# Patient Record
Sex: MALE | Race: WHITE | NOT HISPANIC OR LATINO
[De-identification: names, ages, dates, MRNs, and addresses within clinical notes are randomized per-mention and may not be internally consistent; named-entity substitution may affect disease eponyms.]

---

## 2018-11-07 PROBLEM — Z00.00 ENCOUNTER FOR PREVENTIVE HEALTH EXAMINATION: Status: ACTIVE | Noted: 2018-11-07

## 2018-12-07 ENCOUNTER — APPOINTMENT (OUTPATIENT)
Dept: OTOLARYNGOLOGY | Facility: CLINIC | Age: 58
End: 2018-12-07

## 2019-03-28 ENCOUNTER — APPOINTMENT (OUTPATIENT)
Dept: UROLOGY | Facility: CLINIC | Age: 59
End: 2019-03-28

## 2019-04-15 ENCOUNTER — APPOINTMENT (OUTPATIENT)
Dept: ORTHOPEDIC SURGERY | Facility: CLINIC | Age: 59
End: 2019-04-15

## 2019-05-17 ENCOUNTER — APPOINTMENT (OUTPATIENT)
Dept: NEUROLOGY | Facility: CLINIC | Age: 59
End: 2019-05-17
Payer: MEDICAID

## 2019-05-17 VITALS
HEIGHT: 69.69 IN | TEMPERATURE: 97.9 F | WEIGHT: 191 LBS | SYSTOLIC BLOOD PRESSURE: 114 MMHG | DIASTOLIC BLOOD PRESSURE: 72 MMHG | BODY MASS INDEX: 27.65 KG/M2 | OXYGEN SATURATION: 99 % | HEART RATE: 68 BPM

## 2019-05-17 DIAGNOSIS — Z78.9 OTHER SPECIFIED HEALTH STATUS: ICD-10-CM

## 2019-05-17 DIAGNOSIS — Z82.49 FAMILY HISTORY OF ISCHEMIC HEART DISEASE AND OTHER DISEASES OF THE CIRCULATORY SYSTEM: ICD-10-CM

## 2019-05-17 PROCEDURE — 99203 OFFICE O/P NEW LOW 30 MIN: CPT

## 2019-05-17 NOTE — PHYSICAL EXAM
[FreeTextEntry1] : General:\par Constitutional:  Sitting comfortably in NAD.\par Psychiatric: well-groomed, appropriate affect, insight/judgment intact\par Ears, Nose, Throat: no abnormalities, mucus membranes moist\par Mallampati: 1/4\par Neck: supple, no lymphadenopathy or nodules palpable\par Neck Circumference:\par Cardiovascular: regular rate and rhythm, normal S1/S2, no murmurs \par Chest: Clear to bases. 	Abdomen: soft, non-tender\par Extremities: no edema, clubbing or cyanosis\par Skin:  no rash or neurocutaneous signs \par \par Cognitive:\par Orientation, language, memory and knowledge screens intact.\par Cranial Nerves:\par II: Right eye right lateral field cut, mostly near to him; disc margins sharp. III/IV/VI: Right eye miosis/anisocoria with right eye smaller EOMF No nystagmus\par V1V2V3: Symmetric, VII: Face appears symmetric VIII: Normal to screening, IX/X: Palate Elevates Symmetrical  XI: Trapezius Symmetric  XII: Tongue midline\par Motor:\par Power: 5/5 throughout, tone: normal x 4 limbs\par Mild fine tremor in both hands.\par \par Sensation:\par Intact to light touch. \par \par Coordination/Gait:\par Finger-nose-finger intact, normal rapid-alternating movements.\par Fine motor normal with normal rapid finger taps and heel-toe tapping \par Narrow based gait, tandem forward ok\par Hops well on both feet, heel and toe walking normal \par \par Reflexes:\par DTR: 1-2+ symmetric x 4 limbs, though reduced at ankles\par \par

## 2019-05-17 NOTE — HISTORY OF PRESENT ILLNESS
[FreeTextEntry1] : Braxton is a 57 yo RH-man\par \par He notes feels a vibration in his arms, ever since grade 1.  It worsened through adolescence, and has stayed the same severity.\par Sometimes it is worse, brought out by anxiety.  Public speaking was a problem, but better now.\par He has noticed that alcohol can improve the symptoms.  He used to be , but no longer has alcohol.\par Feels that his arms are hollow.\par Handwriting can be tricky at times. fluctuates.  But fluctuations are no worse than 10 years ago, for instance.  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  Father was 'an alcoholic'.  His brother is autistic and not in touch.\par \par No meds now.\par Allergies: NKDA, but mild hayfever/pollen problems.\par \par Social: teacher now,

## 2019-05-17 NOTE — DISCUSSION/SUMMARY
[FreeTextEntry1] : Impression:\par 1) probable essential tremor, fits all of the characteristics, and no warning signs of degenerative extrapyramidal signs.  Treatment options include propranolol and primidone.  He is interested more in a longer-term treatment, and would prefer to avoid beta-blocker-type side-effects due to his active lifestyle, so we will try primidone first.\par \par Plan:\par 1) primidone start at 50mg/d and increase to 100mg as needed.

## 2019-05-17 NOTE — REVIEW OF SYSTEMS
[FreeTextEntry1] : ROS intake form reviewed with patient, see attachment; all negative aside from HPI

## 2019-09-05 ENCOUNTER — APPOINTMENT (OUTPATIENT)
Dept: PHYSICAL MEDICINE AND REHAB | Facility: CLINIC | Age: 59
End: 2019-09-05
Payer: MEDICAID

## 2019-09-05 VITALS — HEIGHT: 69 IN | BODY MASS INDEX: 28.29 KG/M2 | WEIGHT: 191 LBS | RESPIRATION RATE: 16 BRPM

## 2019-09-05 DIAGNOSIS — M25.562 PAIN IN LEFT KNEE: ICD-10-CM

## 2019-09-05 PROCEDURE — 99203 OFFICE O/P NEW LOW 30 MIN: CPT

## 2019-09-16 ENCOUNTER — APPOINTMENT (OUTPATIENT)
Dept: ORTHOPEDIC SURGERY | Facility: CLINIC | Age: 59
End: 2019-09-16

## 2019-11-11 ENCOUNTER — APPOINTMENT (OUTPATIENT)
Dept: NEUROLOGY | Facility: CLINIC | Age: 59
End: 2019-11-11
Payer: MEDICAID

## 2019-11-11 VITALS
WEIGHT: 187 LBS | OXYGEN SATURATION: 96 % | HEART RATE: 66 BPM | DIASTOLIC BLOOD PRESSURE: 87 MMHG | SYSTOLIC BLOOD PRESSURE: 137 MMHG | BODY MASS INDEX: 27.7 KG/M2 | HEIGHT: 69 IN

## 2019-11-11 PROCEDURE — 99213 OFFICE O/P EST LOW 20 MIN: CPT

## 2019-11-13 NOTE — HISTORY OF PRESENT ILLNESS
[FreeTextEntry1] : Braxton is a 60 yo man with a history of tremor.\par \par It appeared to be an essential tremor, and he has found a significant improvement, first on the 50mg, then about 80% improvement at 50mg bid.  Handwriting can still be challenging.\par \par Also taking effexor in the AM.\par Primidone has been partly calming as well.  He sleeps through the night.  He wakes up feeling great, and the morning dose does not make him too sleepy.\par \par \par Background:\par He notes feels a vibration in his arms, ever since grade 1.  It worsened through adolescence, and has stayed the same severity.\par Sometimes it is worse, brought out by anxiety.  Public speaking was a problem, but better now.\par He has noticed that alcohol can improve the symptoms.  He used to be , but no longer has alcohol.\par Feels that his arms are hollow.\par Handwriting can be tricky at times. fluctuates.  But fluctuations are no worse than 10 years ago, for instance.\par \par He does not recall seeing it in his family, perhaps with his mother, but not sure.  Father was 'an alcoholic'.  His brother is autistic and not in touch.\par \par No meds now.\par Allergies: NKDA, but mild hayfever/pollen problems.\par \par Social: teacher now,

## 2019-11-13 NOTE — DISCUSSION/SUMMARY
[FreeTextEntry1] : Impression:\par 1) essential tremor, fits all of the characteristics with good response to primidone (about 80% resolved) and no warning signs of degenerative extrapyramidal signs.  However, still some difficulties with handwriting and tremor still shows up from time to time, so room for improvement.\par \par Plan:\par 1) primidone, continue to attempt to increase dose as needed, to response, to 150mg/d.  Can likely go higher as long as no adverse effects,\par 2) alternative includes low dose propranolol PRN.

## 2019-11-13 NOTE — PHYSICAL EXAM
[FreeTextEntry1] : General:\par Constitutional:  Sitting comfortably in NAD.\par Psychiatric: well-groomed, appropriate affect\par Ears, Nose, Throat: no abnormalities, mucus membranes moist\par Neck: supple\par Extremities: no edema, clubbing or cyanosis\par Skin: no rash or neuro-cutaneous signs \par \par Cognitive:\par Orientation, language, memory and knowledge screens intact.\par \par Cranial Nerves:\par II: MARIAMA. III/IV/VI: EOM Full.  VII: Face appears symmetric VIII: Normal to screening\par IX/X: normal phonation  XI: Trapezius Symmetric  XII: Tongue midline\par Motor:\par Power: no pronator drift\par \par Tremor:  mild but visible fine tremor with hands outstretched and under the nose.\par  \par Narrow based gait\par \par \par \par

## 2020-01-03 ENCOUNTER — APPOINTMENT (OUTPATIENT)
Dept: UROLOGY | Facility: CLINIC | Age: 60
End: 2020-01-03

## 2020-01-17 ENCOUNTER — APPOINTMENT (OUTPATIENT)
Dept: PHYSICAL MEDICINE AND REHAB | Facility: CLINIC | Age: 60
End: 2020-01-17

## 2020-01-27 ENCOUNTER — APPOINTMENT (OUTPATIENT)
Dept: PHYSICAL MEDICINE AND REHAB | Facility: CLINIC | Age: 60
End: 2020-01-27
Payer: MEDICAID

## 2020-01-27 VITALS
HEIGHT: 69 IN | WEIGHT: 190 LBS | BODY MASS INDEX: 28.14 KG/M2 | HEART RATE: 78 BPM | RESPIRATION RATE: 16 BRPM | OXYGEN SATURATION: 98 %

## 2020-01-27 DIAGNOSIS — M51.27 OTHER INTERVERTEBRAL DISC DISPLACEMENT, LUMBOSACRAL REGION: ICD-10-CM

## 2020-01-27 PROCEDURE — 99214 OFFICE O/P EST MOD 30 MIN: CPT

## 2020-02-06 ENCOUNTER — FORM ENCOUNTER (OUTPATIENT)
Age: 60
End: 2020-02-06

## 2020-02-07 ENCOUNTER — OUTPATIENT (OUTPATIENT)
Dept: OUTPATIENT SERVICES | Facility: HOSPITAL | Age: 60
LOS: 1 days | End: 2020-02-07
Payer: MEDICAID

## 2020-02-07 ENCOUNTER — APPOINTMENT (OUTPATIENT)
Dept: RADIOLOGY | Facility: CLINIC | Age: 60
End: 2020-02-07

## 2020-02-07 PROCEDURE — 73502 X-RAY EXAM HIP UNI 2-3 VIEWS: CPT | Mod: 26,RT

## 2020-02-14 ENCOUNTER — APPOINTMENT (OUTPATIENT)
Dept: PHYSICAL MEDICINE AND REHAB | Facility: CLINIC | Age: 60
End: 2020-02-14
Payer: MEDICAID

## 2020-02-14 VITALS
BODY MASS INDEX: 28.14 KG/M2 | RESPIRATION RATE: 16 BRPM | OXYGEN SATURATION: 98 % | HEART RATE: 78 BPM | HEIGHT: 69 IN | WEIGHT: 190 LBS

## 2020-02-14 PROCEDURE — 99214 OFFICE O/P EST MOD 30 MIN: CPT | Mod: 25

## 2020-02-14 PROCEDURE — 20611 DRAIN/INJ JOINT/BURSA W/US: CPT | Mod: RT

## 2020-02-18 ENCOUNTER — FORM ENCOUNTER (OUTPATIENT)
Age: 60
End: 2020-02-18

## 2020-02-19 ENCOUNTER — APPOINTMENT (OUTPATIENT)
Dept: MRI IMAGING | Facility: CLINIC | Age: 60
End: 2020-02-19
Payer: MEDICAID

## 2020-02-19 ENCOUNTER — OUTPATIENT (OUTPATIENT)
Dept: OUTPATIENT SERVICES | Facility: HOSPITAL | Age: 60
LOS: 1 days | End: 2020-02-19

## 2020-02-19 PROCEDURE — 72148 MRI LUMBAR SPINE W/O DYE: CPT | Mod: 26

## 2020-02-24 ENCOUNTER — APPOINTMENT (OUTPATIENT)
Dept: PHYSICAL MEDICINE AND REHAB | Facility: CLINIC | Age: 60
End: 2020-02-24
Payer: MEDICAID

## 2020-02-24 VITALS
OXYGEN SATURATION: 97 % | HEART RATE: 70 BPM | WEIGHT: 190 LBS | HEIGHT: 69 IN | RESPIRATION RATE: 16 BRPM | BODY MASS INDEX: 28.14 KG/M2

## 2020-02-24 DIAGNOSIS — M76.11 PSOAS TENDINITIS, RIGHT HIP: ICD-10-CM

## 2020-02-24 PROCEDURE — 20611 DRAIN/INJ JOINT/BURSA W/US: CPT | Mod: RT

## 2020-02-24 PROCEDURE — 99214 OFFICE O/P EST MOD 30 MIN: CPT | Mod: 25

## 2020-02-24 NOTE — ASSESSMENT
[FreeTextEntry1] : Impression:\par 1. Left Meniscus Injury\par 2. Right Hip Labral Tear\par 3. Right Psoas Tendinopathy\par 3. Bilateral S1 Radiculopathy\par \par Plan: Review of the history, physical examination, and imaging, the patient's symptoms are consistent with Right Hip labral tear and psoas tendinopathy with underlying and discogenic LBP and bilateral S1 radiculopathy. The Left Meniscus Injury has improved with PT. The imaging results and diagnosis were discussed in detail with the patient. We discussed all the potential treatment options including physical therapy, oral medication, ultrasound guided injections, and surgery; as well as alternative therapeutics such as acupuncture and massage. We also discussed the importance of weight loss, ergonomics, and posture in the long term management of the condition. At this time the patient is interested in interventional options for symptom reduction; I offered USG Psoas Bursa CSI in the office today, please see procedure note for full detail. We will also plan for Bilateral S1 TFESI. We discussed all the risks, benefits, alternative treatments, and prognosis. The patient expressed understanding and would like to move forward. We will schedule for the injection as well as follow up post-procedure. The patient expressed verbal understanding and is in agreement with the plan of care. All of the patient's questions and concerns were addressed during today's visit.

## 2020-02-24 NOTE — HISTORY OF PRESENT ILLNESS
[FreeTextEntry1] : Mr. ORAL ESPINOZA is a very pleasant 59 year male who comes in for MRI review and follow up evaluation of left knee, hip, and low back. At his previous visit we performed intra-articular CSI to his hip which provided 50% improvement in his hip symptoms, however he continues to have focal sharp groin pain, especially with flexing his hip. The knee pain has improved after participating in physical therapy. The back pain radiates into his right>>left legs, through his buttock/hamstring/calf, intermittent in nature and described as sharp and aching. The pain is rated as 3/10 during today's visit, and ranges from 3-7/10. The patient's symptoms are aggravated by standing extended periods of time  and alleviated by rest and stretching . The patient denies any night pain, numbness/tingling, weakness, or bowel/bladder dysfunction. The patient has no other complaints at this time.

## 2020-02-24 NOTE — PROCEDURE
[de-identified] : Procedure: RIGHT Iliopsoas Bursa Injection with Ultrasound Guidance\par \par Findings: The RIGHT anterior hip was examined under ultrasound using a 10mHz linear array transducer. There was evidence of a mild bursal effusion in the iliopsoas bursa.\par \par Injectate: 1 mL Kenalog (40mg/mL) with 1ml 0.25% Bupivicaine and 1mL 1% Lidocaine\par \par After risks, benefits and alternatives were discussed and the patient expressed understanding, informed consent was obtained. Risks include but are not limited to bleeding, infection, worsening pain, nerve damage, scar formation. \par \par Ultrasound was indicated for needle guidance, to ensure needle placement, and to assess for any effusions or vasculature in the path of the needle.\par \par The RIGHT iliopsoas tendon and bursa was identified by ultrasound, and probe location was marked on the skin. Ultrasound findings are noted above. The overlying area was prepped and draped in a sterile fashion with Chloraprep. After skin preparation, a 25 gauge needle was used to anesthetize the skin with 3 mL of 1% Lidocaine. Then a 3.5" 22 gauge spinal needle was then introduced and advanced under ultrasound guidance, needle tip position was confirmed, aspiration for blood prior to injection was negative. The injectate solution mentioned above was administered into the iliopsoas bursa under direct ultrasound visualization without resistance. Fluid distention was appreciated on ultrasound confirming placement. Ultrasound images were permanently stored as part of the patient’s record. \par \par At the conclusion of the procedure the needle was removed, the area was cleaned with alcohol and a band-aid was placed over the injection site. The patient tolerated the procedure well. There were no complications during or after the procedure. The patient reported improvement in symptoms following the procedure. Post-procedure care instructions and follow up appointment were given. If there are any complications, the patient was instructed to call the office. \par \par \par

## 2020-02-24 NOTE — DATA REVIEWED
[FreeTextEntry1] : XR L Knee 3/2019: Unremarkable\par \par MR LS 2/2020: L4/5 posterior annular fissure, facet hypertrophy, bilateral neural foraminal narrowing. L5/S1 diffuse disc bulge, facet hypertrophy, bilateral neural foraminal narrowing with compression of the L5 nerve roots bilaterally.\par

## 2020-02-24 NOTE — PHYSICAL EXAM
[FreeTextEntry1] : GEN:  NAD, AAOx3.\par HEENT:  NCAT. EOMI. Anicteric sclerae, no discharge.\par PSYCH:  Normal mood and affect. Responds appropriately to commands.\par CV:  DP and PT pulses 2+ bilaterally, no edema.\par INSPECTION:  No effusion, discoloration. Normal patellar tracking. \par GAIT: (+) antalgic.\par Lumbosacral AROM: within normal limits.\par Hip ROM: Decreased Flex/IR Right w/ pain end-range. \par Knee AROM: Full and pain free.\par PALPATION:  No effusion, warmth or crepitus. No tenderness noted at patellar facets, medial or lateral joint line, popliteal fossa, patellar tendon, quad tendon, hamstrings, ITB.  \par LYMPH:  No popliteal LAD or lymphedema.\par REFLEXES:  2+ symmetric bilateral knee and ankles.\par SENSATION:  Normal to light touch in the bilateral lower extremities.\par MOTOR:  5/5 in all key muscle groups of the bilateral lower limbs. No spasticity, tremor, atrophy or contractures noted.\par SPECIAL:  Apley Grind negative bilaterally, Barry negative bilaterally, Medial/Lateral compression negative bilaterally, Varus/Valgus stress negative bilaterally, Single Leg Squat (+) Left, Anterior/Posterior drawer negative bilaterally, Lachman negative bilaterally. Hyperflexion (+) Left. \par \par LUMBAR\par STRENGTH: 5/5 bilateral hip flexors, knee extensors, knee flexors, ankle dorsiflexors, long toe extensors, ankle plantar flexors, hip extensors, hip abductors. (+) Pain Resisted Hip Flexion.\par TONE: Normal, No clonus.\par REFLEXES: 2+ symmetric patella, medial hamstring, achilles. Plantars downgoing bilaterally.\par SENS: Grossly intact to light touch bilateral lower extremities.\par INSP: Spine alignment is midline, with no evidence of scoliosis.\par STANCE: No Trendelenburg with single leg stance.\par GAIT: Non antalgic, normal reciprocating heel to toe\par LUMBAR ROM: Flexion, extension, side-bending, rotation, oblique extension all full and pain free.  \par HIP ROM: Full w/ pain Flexion Right. \par PALP: There is no tenderness over the midline spinous processes, paravertebral muscles, SIJ, or greater trochanters bilaterally. (+) TTP R-Psoas Bursa. \par SPECIAL: SLR and Slump (+) bilaterally. ANAM RAMIRES negative bilaterally.

## 2020-03-10 ENCOUNTER — APPOINTMENT (OUTPATIENT)
Dept: UROLOGY | Facility: CLINIC | Age: 60
End: 2020-03-10
Payer: MEDICAID

## 2020-03-10 ENCOUNTER — APPOINTMENT (OUTPATIENT)
Dept: PHYSICAL MEDICINE AND REHAB | Facility: CLINIC | Age: 60
End: 2020-03-10
Payer: MEDICAID

## 2020-03-10 VITALS — HEART RATE: 73 BPM | TEMPERATURE: 98.5 F | DIASTOLIC BLOOD PRESSURE: 86 MMHG | SYSTOLIC BLOOD PRESSURE: 141 MMHG

## 2020-03-10 PROCEDURE — 64483 NJX AA&/STRD TFRM EPI L/S 1: CPT | Mod: 50

## 2020-03-10 PROCEDURE — 51798 US URINE CAPACITY MEASURE: CPT

## 2020-03-10 PROCEDURE — 99204 OFFICE O/P NEW MOD 45 MIN: CPT | Mod: 25

## 2020-03-10 NOTE — HISTORY OF PRESENT ILLNESS
[FreeTextEntry1] : 59M PSA 0.4 3/2019 referred with voiding complaints. IPSS 21. nocturia x 3-4. no urgneyc. no hematuria. no dysuria. decreased FOS. multiple small volume voids. +straining. +hesitancy. +intermittency. no fevers chills. no family hsitory prostate kdieny baldder cancer.

## 2020-03-10 NOTE — PHYSICAL EXAM
[General Appearance - Well Developed] : well developed [General Appearance - Well Nourished] : well nourished [Normal Appearance] : normal appearance [Well Groomed] : well groomed [Heart Rate And Rhythm] : Heart rate and rhythm were normal [] : no respiratory distress [Abdomen Soft] : soft [Abdomen Tenderness] : non-tender [Abdomen Hernia] : no hernia was discovered [Costovertebral Angle Tenderness] : no ~M costovertebral angle tenderness [Urethral Meatus] : meatus normal [Penis Abnormality] : normal circumcised penis [Urinary Bladder Findings] : the bladder was normal on palpation [Scrotum] : the scrotum was normal [Epididymis] : the epididymides were normal [Testes Tenderness] : no tenderness of the testes [Testes Mass (___cm)] : there were no testicular masses [No Prostate Nodules] : no prostate nodules [Prostate Size ___ gm] : prostate size [unfilled] gm [Normal Station and Gait] : the gait and station were normal for the patient's age [Skin Color & Pigmentation] : normal skin color and pigmentation [No Focal Deficits] : no focal deficits [Oriented To Time, Place, And Person] : oriented to person, place, and time [No Palpable Adenopathy] : no palpable adenopathy

## 2020-04-01 ENCOUNTER — APPOINTMENT (OUTPATIENT)
Dept: PHYSICAL MEDICINE AND REHAB | Facility: CLINIC | Age: 60
End: 2020-04-01
Payer: MEDICAID

## 2020-04-01 VITALS — HEIGHT: 69 IN | BODY MASS INDEX: 27.4 KG/M2 | WEIGHT: 185 LBS

## 2020-04-01 PROCEDURE — 99214 OFFICE O/P EST MOD 30 MIN: CPT | Mod: 95

## 2020-04-01 NOTE — ASSESSMENT
[FreeTextEntry1] : Impression:\par 1. Left Meniscus Injury\par 2. Right Hip Labral Tear\par 3. Right Psoas Tendinopathy\par 3. Bilateral S1 Radiculopathy\par \par Plan: The patient has shown a positive response to the epidural steroid injection, reporting approximately 40-50% improvement in symptoms. Further review of the history and imaging, the patient's residual symptoms remain  consistent with Right Hip labral tear and psoas tendinopathy with underlying and discogenic LBP and bilateral S1 radiculopathy. The Left Meniscus Injury has improved with PT. The imaging results and diagnosis were reviewed with the patient. We discussed available treatment options given the current circumstances which include oral medication, virtual physical therapy/home exercises, and rest/heat/ice. We also reviewed the importance of weight loss, biomechanics and posture in the management of the condition. At this time the patient is interested in doing HEP and waiting for repeat interventional options when they become available. The patient expressed verbal understanding and is in agreement with the plan of care. All of the patient's questions and concerns were addressed during today's visit.\par \par At total of 25 minutes of face-to-face time was spent with the patient during the virtual encounter today.

## 2020-04-01 NOTE — HISTORY OF PRESENT ILLNESS
[FreeTextEntry1] : Mr. ORAL ESPINOZA is a 59 year male who is being evaluated today via TeleHealth due to the ongoing COVID-19 pandemic. The encounter was provided using [employer] Room with Real-time 2-way audio/visual communication. The patient verbally consented to receiving care via TeleHealth services in accordance with the details outlined in the "Informed Consent for TeleHealth Services during a Public Health Emergency" form. \par \par This is a follow up encounter after undergoing an GABBY on 03/10/2020. The patient reports approximately 40% improvement following the procedure at this time. There is still some residual discomfort, which remains in the back and radiates into his right>>left legs, through his buttock/hamstring/calf, intermittent in nature and described as sharp and aching. The pain is rated as 3/10 during today's visit, and ranges from 3-7/10. The patient's symptoms are aggravated by standing extended periods of time  and alleviated by rest and stretching . The patient denies any night pain, numbness/tingling, weakness, or bowel/bladder dysfunction. His right groin/hip pain has improved since the USG Psoas CSI at his last visit. The patient has no other complaints at this time.\par

## 2020-04-01 NOTE — PHYSICAL EXAM
[FreeTextEntry1] : GEN:  NAD, AAOx3.\par PSYCH:  Normal mood and affect. Responds appropriately to commands.\par INSP: Spine alignment is midline, with no evidence of scoliosis.\par STANCE: No Trendelenburg with single leg stance.\par GAIT: Non antalgic, normal reciprocating heel to toe\par LUMBAR AROM: Flexion, extension, side-bending, rotation full and pain free.  \par HIP AROM: Full and pain free \par

## 2020-04-01 NOTE — REVIEW OF SYSTEMS
[Patient Intake Form Reviewed] : Patient intake form was reviewed [FreeTextEntry1] : CON: Denies fevers/chills, night pain/weight loss.\par CVS: Denies chest pain/SOB/cough/palpitations. \par MSK: as per HPI, otherwise denies additional joint pain, swelling, stiffness.

## 2020-04-03 ENCOUNTER — APPOINTMENT (OUTPATIENT)
Dept: UROLOGY | Facility: CLINIC | Age: 60
End: 2020-04-03

## 2020-04-06 ENCOUNTER — APPOINTMENT (OUTPATIENT)
Dept: UROLOGY | Facility: CLINIC | Age: 60
End: 2020-04-06
Payer: MEDICAID

## 2020-04-06 PROCEDURE — 99213 OFFICE O/P EST LOW 20 MIN: CPT | Mod: 95

## 2020-04-06 NOTE — ASSESSMENT
[FreeTextEntry1] : BPH\par persistent bother but good improvement in symptoms\par reviewed risks side effects with increased flomax dosing\par would like to increase to flomax 0.8\par will follow up in 1 month to review

## 2020-04-06 NOTE — HISTORY OF PRESENT ILLNESS
[Home] : at home, [unfilled] , at the time of the visit. [Medical Office: ([facility])___] : at ~his/her~ medical office located in [place] [Patient] : the patient [FreeTextEntry1] : The patient-doctor relationship has been established in a face to face fashion via real time video/audio HIPAA compliant communication using telemedicine software.  The patient's identity has been confirmed.  The patient was previously emailed a copy of the telemedicine consent.  They have had a chance to review and has now given verbal consent and has requested care to be assessed and treated through telemedicine and understands there maybe limitations in this process and they may need further follow up care in the office and or hospital settings.   \par  \par Returns for follow up\par now on tamsulosin 0.4 x 1 month\par symptoms have improved 80%\par stranguria and nighttime symptoms remain and are mildly bothersome\par no gross hematuria\par feels well overall\par no additional complaints

## 2020-04-20 ENCOUNTER — TRANSCRIPTION ENCOUNTER (OUTPATIENT)
Age: 60
End: 2020-04-20

## 2020-05-06 ENCOUNTER — APPOINTMENT (OUTPATIENT)
Dept: NEUROLOGY | Facility: CLINIC | Age: 60
End: 2020-05-06

## 2020-05-11 ENCOUNTER — APPOINTMENT (OUTPATIENT)
Dept: NEUROLOGY | Facility: CLINIC | Age: 60
End: 2020-05-11

## 2020-05-12 ENCOUNTER — APPOINTMENT (OUTPATIENT)
Dept: UROLOGY | Facility: CLINIC | Age: 60
End: 2020-05-12
Payer: MEDICAID

## 2020-05-12 PROCEDURE — 99441: CPT

## 2020-05-12 NOTE — ASSESSMENT
[FreeTextEntry1] : BPH on flomax 0.8\par happy with improvement\par no interest in PUL at this time\par f/u 6 months

## 2020-05-12 NOTE — HISTORY OF PRESENT ILLNESS
[Home] : at home, [unfilled] , at the time of the visit. [Medical Office: ([facility])___] : at the medical office located in  [Patient] : the patient [FreeTextEntry1] : doing great on flomax 0.8\par feels well \par no side effects

## 2020-05-18 ENCOUNTER — APPOINTMENT (OUTPATIENT)
Dept: PHYSICAL MEDICINE AND REHAB | Facility: CLINIC | Age: 60
End: 2020-05-18
Payer: MEDICAID

## 2020-05-18 PROCEDURE — 99214 OFFICE O/P EST MOD 30 MIN: CPT | Mod: 95

## 2020-05-18 NOTE — HISTORY OF PRESENT ILLNESS
[FreeTextEntry1] : Mr. ORAL ESPINOZA is a 59 year male who is being evaluated today via TeleHealth due to the ongoing COVID-19 pandemic. The encounter was provided using [employer] Room with Real-time 2-way audio/visual communication. The patient verbally consented to receiving care via TeleHealth services in accordance with the details outlined in the "Informed Consent for TeleHealth Services during a Public Health Emergency" form. \par \par This is a follow up evaluation of back pain radiating into his right>>left legs, through his buttock, hamstring, calf, intermittent in nature and described as sharp and aching. There is also persistent pain in his right hip. The pain is rated as 3/10 during today's visit, and ranges from 3-7/10. The patient's symptoms are aggravated by standing extended periods of time  and alleviated by rest and stretching . The patient denies any night pain, numbness/tingling, weakness, or bowel/bladder dysfunction. The patient has no other complaints at this time.\par

## 2020-05-18 NOTE — ASSESSMENT
[FreeTextEntry1] : Impression:\par 1. Left Meniscus Injury\par 2. Right Hip Labral Tear\par 3. Right Psoas Tendinopathy\par 3. Bilateral S1 Radiculopathy\par \par Plan: The patient has shown a positive response to the epidural steroid injection, reporting approximately 40-50% improvement in symptoms. Further review of the history and imaging, the patient's residual symptoms remain  consistent with Right Hip labral tear and psoas tendinopathy with underlying and discogenic LBP and bilateral S1 radiculopathy. The Left Meniscus Injury has improved with PT. The imaging results and diagnosis were reviewed with the patient. We discussed available treatment options given the current circumstances which include oral medication, virtual physical therapy/home exercises, and rest/heat/ice. We also reviewed the importance of weight loss, biomechanics and posture in the management of the condition. At this time the patient is interested in doing HEP and waiting for repeat interventional options when they become available. We will also send him for MRI Right Hip to further evaluate for labral pathology as his hip pain has not responded significantly to conservative management. The patient expressed verbal understanding and is in agreement with the plan of care. All of the patient's questions and concerns were addressed during today's visit.\par \par At total of 25 minutes of face-to-face time was spent with the patient during the virtual encounter today.

## 2020-06-04 ENCOUNTER — APPOINTMENT (OUTPATIENT)
Dept: PHYSICAL MEDICINE AND REHAB | Facility: CLINIC | Age: 60
End: 2020-06-04
Payer: MEDICAID

## 2020-06-04 PROCEDURE — 64483 NJX AA&/STRD TFRM EPI L/S 1: CPT | Mod: LT

## 2020-07-07 ENCOUNTER — APPOINTMENT (OUTPATIENT)
Dept: PHYSICAL MEDICINE AND REHAB | Facility: CLINIC | Age: 60
End: 2020-07-07
Payer: MEDICAID

## 2020-07-07 VITALS — WEIGHT: 185 LBS | HEIGHT: 69 IN | BODY MASS INDEX: 27.4 KG/M2 | RESPIRATION RATE: 16 BRPM | OXYGEN SATURATION: 97 %

## 2020-07-07 PROCEDURE — 99214 OFFICE O/P EST MOD 30 MIN: CPT

## 2020-07-08 ENCOUNTER — APPOINTMENT (OUTPATIENT)
Dept: ORTHOPEDIC SURGERY | Facility: CLINIC | Age: 60
End: 2020-07-08
Payer: MEDICAID

## 2020-07-08 VITALS — SYSTOLIC BLOOD PRESSURE: 138 MMHG | DIASTOLIC BLOOD PRESSURE: 80 MMHG | OXYGEN SATURATION: 98 % | HEART RATE: 69 BPM

## 2020-07-08 VITALS — WEIGHT: 175 LBS | BODY MASS INDEX: 25.84 KG/M2

## 2020-07-08 PROCEDURE — 99205 OFFICE O/P NEW HI 60 MIN: CPT

## 2020-07-08 PROCEDURE — 72170 X-RAY EXAM OF PELVIS: CPT

## 2020-07-08 NOTE — PHYSICAL EXAM
[de-identified] : General appearance: well nourished and hydrated, pleasant, alert and oriented x 3, cooperative.  Slim athletic habitus.\par HEENT: normocephalic, EOM intact, wearing mask, external auditory canal clear.  \par Cardiovascular: no lower leg edema, no varicosities, dorsalis pedis pulses palpable and symmetric.  \par Lymphatics: no palpable lymphadenopathy, no lymphedema.  \par Neurologic: sensation is normal, no muscle weakness in upper or lower extremities, patella tendon reflexes present and symmetric.  \par Dermatologic: skin moist, warm, no rash.  \par Spine: cervical spine with normal lordosis and painless range of motion, thoracic spine with normal kyphosis and painless range of motion, lumbosacral spine with normal lordosis and painless range of motion.  No tenderness to palpation along midline spine and paraspinal musculature.  Sacroiliac joints nontender bilaterally. Negative SLR and crossed SLR tests bilaterally.\par Gait: normal.  \par \par RLE about 2mm shorter than LLE\par \par Left hip:\par - Skin intact, no scars or other prior surgical incisions noted\par - No swelling/ecchymosis\par - No specific tenderness on palpation\par - ROM: 120 flexion, 0 extension, 20 adduction, 50 abduction, 15 internal rotation, 75 external rotation\par - ANAM painless\par - FADIR painless\par - Maria Luisa negative\par - Stinchfield negative\par - Flexor power 5/5\par - Abductor power 5/5\par \par Right hip:\par - Skin intact, no scars or other prior surgical incisions noted\par - No swelling/ecchymosis\par - Anterior tenderness on palpation\par - ROM: 100 flexion, 0 extension, 15 adduction, 35 abduction, 5 internal rotation, 75 external rotation\par - ANAM painless\par - FADIR very painful anteriorly\par - Maria Luisa negative\par - Stinchfield positive\par - Flexor power 5/5\par - Abductor power 5/5\par - Mild pain on axial loading [de-identified] : AP pelvis was taken today in the office and interpreted by me. Imaging was also reviewed from [facility] and [facility].\par \par Today's film demonstrates moderate degenerative right hip joint disease with superior joint space narrowing. Cystic and sclerotic changes noted within superior femoral head. Small cam lesion. No displaced fracture. Left hip demonstrates normal joint space, mild femoral cam lesion.\par \par [facility] R hip MRI dated 7/6/20 demonstrates posterosuperior femoral head osteonecrosis with associated subchondral collapse of about 1-2mm under the dome. There is diffuse bony edema throughout the entire neck and head with incomplete stress fracture lines at the subcapital and basicervical levels. Labral tears with associated paralabral cysts. Severe chondral wear.

## 2020-07-08 NOTE — HISTORY OF PRESENT ILLNESS
[de-identified] : 61y/o male referred by Dr. Lee for evaluation of right hip osteonecrosis, DJD, labral tears, and stress fractures. He reports that he started having intermittent right hip/groin pain around January 2019. These symptoms became constant by January 2020. He has no painless days. He has occasionally nighttime awakenings from pain. He has had episodes of right leg buckling with falls. He was accustomed to a high level of physical activity (distance runner, in-line skater, cyclist) and has had to give up most of his athletic pursuits. He began PT in March and is still intermittently seeing a therapist, but is not getting much improvement. He has been on and off of ibuprofen but prefers to avoid medications. He has had multiple hip/psoas CSI, most recently in February, which provided limited relief. He has also received treatment for lumbar radiculopathy and left knee meniscal pathology, both of which are satisfactorily resolved. Hip MRI was recently done which demonstrated the above pathology. He is here for surgical evaluation.\par \par He is a  and , currently not working secondary to the pandemic. His job requires that he stand and walk for long periods of time. He had found this increasingly difficult since the new year.\par \par PMH significant only for essential tremor. [de-identified] : describes his pain as sharp/dull [10] : a maximum pain level of 10/10 [3] : a minimum pain level of 3/10

## 2020-07-08 NOTE — DISCUSSION/SUMMARY
[de-identified] : 61y/o male with right hip osteonecrosis and secondary degenerative joint disease\par - We had a long discussion about the diagnoses, natural history, and treatment options. He has been through exhaustive conservative treatment for the last seven months and wishes to receive definitive treatment. We reviewed possible joint-preserving measures including restricted weightbearing, percutaneous drilling, formal core decompression, and hip arthroscopy for labral and osteochondral work. I cautioned him that the likelihood of success with such procedures is relatively low given that he already displays degenerative changes and femoral head subchondral collapse. We also discussed total hip arthroplasty. I think that EDUAR is more appropriate for him and will give the most predictable outcome.\par - A discussion was had with the patient regarding a right total hip replacement. Anterior and posterior exposures were discussed. I think that an anterior approach would be most appropriate for him. A long discussion was had with the patient as what the total joint replacement would entail. A model was used to demonstrate the operation and to discuss bearing surfaces of the implants. Implant fixation methods were discussed; my plan is fully cementless fixation in this case. The hospitalization and rehabilitation were discussed. The use of perioperative antibiotics and DVT prophylaxis were discussed. The risks, benefits and alternatives to surgical intervention were discussed at length with the patient. Specific risks discussed included: infection, wound breakdown, numbness and damage to nerves, tendon, muscle, arteries or other blood vessels, and the possibility of leg length discrepancy. The possibility of recurrent pain, no improvement in pain and actual worsening of the pain were also mentioned in conversation with the patient. Medical complications related to the patient's general medical health including deep vein thrombosis, pulmonary embolus, heart attack, stroke, death and other complications from anesthesia were discussed as well. The patient was told that we will take steps to minimize these risks by using sterile technique, antibiotics and DVT prophylaxis when appropriate and following the patient postoperatively in the clinic setting to monitor progress. The benefits of surgery were discussed with the patient including the potential to improve the current clinical condition through operative intervention. Alternatives to surgical intervention include continued conservative management which may yield less than optimal results in this particular patient. All questions were answered to the satisfaction of the patient. The patient was given a copy of my preoperative packet with additional information about the procedure.\par - Schedule for R EDUAR (DA) at a convenient time\par - Preop class, standard medical preop clearance, COVID-19 testing\par - Recontact to assess willingness to participate in [employer] cup clinical study

## 2020-07-15 ENCOUNTER — OUTPATIENT (OUTPATIENT)
Dept: OUTPATIENT SERVICES | Facility: HOSPITAL | Age: 60
LOS: 1 days | End: 2020-07-15
Payer: COMMERCIAL

## 2020-07-15 DIAGNOSIS — Z01.818 ENCOUNTER FOR OTHER PREPROCEDURAL EXAMINATION: ICD-10-CM

## 2020-07-15 PROCEDURE — 93010 ELECTROCARDIOGRAM REPORT: CPT

## 2020-07-15 PROCEDURE — 71046 X-RAY EXAM CHEST 2 VIEWS: CPT | Mod: 26

## 2020-07-15 PROCEDURE — 93005 ELECTROCARDIOGRAM TRACING: CPT

## 2020-07-15 PROCEDURE — 71046 X-RAY EXAM CHEST 2 VIEWS: CPT

## 2020-07-20 ENCOUNTER — APPOINTMENT (OUTPATIENT)
Dept: DERMATOLOGY | Facility: CLINIC | Age: 60
End: 2020-07-20
Payer: MEDICAID

## 2020-07-20 ENCOUNTER — LABORATORY RESULT (OUTPATIENT)
Age: 60
End: 2020-07-20

## 2020-07-20 VITALS — DIASTOLIC BLOOD PRESSURE: 80 MMHG | SYSTOLIC BLOOD PRESSURE: 125 MMHG | TEMPERATURE: 97.9 F

## 2020-07-20 DIAGNOSIS — Z91.89 OTHER SPECIFIED PERSONAL RISK FACTORS, NOT ELSEWHERE CLASSIFIED: ICD-10-CM

## 2020-07-20 PROCEDURE — 11103 TANGNTL BX SKIN EA SEP/ADDL: CPT

## 2020-07-20 PROCEDURE — 99203 OFFICE O/P NEW LOW 30 MIN: CPT | Mod: 25

## 2020-07-20 PROCEDURE — 11102 TANGNTL BX SKIN SINGLE LES: CPT

## 2020-07-20 PROCEDURE — 11900 INJECT SKIN LESIONS </W 7: CPT

## 2020-07-20 NOTE — HISTORY OF PRESENT ILLNESS
[FreeTextEntry1] : cyst [de-identified] : 61 yo M here for above\par cysts removed over the years on his back, last year has had one but recently became inflamed and red\par no fever or chills\par has had multiple biopsies in past, all benign, last skin check a couple years ago\par having hip replacement soon

## 2020-07-20 NOTE — PHYSICAL EXAM
[Alert] : alert [Oriented x 3] : ~L oriented x 3 [Well Nourished] : well nourished [Conjunctiva Non-injected] : conjunctiva non-injected [No Visual Lymphadenopathy] : no visual  lymphadenopathy [No Clubbing] : no clubbing [No Edema] : no edema [No Bromhidrosis] : no bromhidrosis [No Chromhidrosis] : no chromhidrosis [Full Body Skin Exam Performed] : performed [FreeTextEntry3] : brown macules, tanned skin\par inflamed subcutaneous nodule 3 cm upper left back\par 24 x 18 mm asymmetric thin pink brown irregular plaque (but with somewhat even reticular pigment on dermoscopy) R midback paraspinal and superior to this similar 9 x 11 mm thin plaque L paraspinal

## 2020-07-30 ENCOUNTER — TRANSCRIPTION ENCOUNTER (OUTPATIENT)
Age: 60
End: 2020-07-30

## 2020-07-30 VITALS
OXYGEN SATURATION: 97 % | WEIGHT: 180.56 LBS | RESPIRATION RATE: 18 BRPM | SYSTOLIC BLOOD PRESSURE: 114 MMHG | TEMPERATURE: 98 F | HEART RATE: 67 BPM | DIASTOLIC BLOOD PRESSURE: 67 MMHG | HEIGHT: 70 IN

## 2020-07-30 RX ORDER — POVIDONE-IODINE 5 %
1 AEROSOL (ML) TOPICAL ONCE
Refills: 0 | Status: COMPLETED | OUTPATIENT
Start: 2020-07-31 | End: 2020-07-31

## 2020-07-30 NOTE — H&P ADULT - HISTORY OF PRESENT ILLNESS
60 y.o M with right hip pain x    Presents for elective right total hip replacement surgery 60 y.o M with right hip pain x 6 months. PAtient denies known injury. Reports hip pain has gradually worsened. Rated as severe. Endorses stiffness, limited ROM, pain with weight bearing through RLE. Reports failure of conservative management including activity modification, oral analgesics. Patient denies associated numbness, tingling. use of a cane or walker. PAtient denies recent fevers, cough, sick contacts. Of note, he reports dermatologist prescribed 10 day course of abx for red/swollen cyst on his back. He completed abx as prescribed and denies pain, drainage, fevers associated with cyst.     Presents for elective right total hip replacement surgery with Dr. Hwang

## 2020-07-30 NOTE — PATIENT PROFILE ADULT - LONGEST PERIOD TOBACCO-FREE
quit 8 yrs ago, smoked for 35 yrs , 0.5 pack a day quit 8 yrs ago, smoked for 35 yrs ,  0.5 pack a day

## 2020-07-30 NOTE — PATIENT PROFILE ADULT - NSPROEDALEARNPREF_GEN_A_NUR
individual instruction written material/skill demonstration/verbal instruction/individual instruction

## 2020-07-30 NOTE — H&P ADULT - PROBLEM SELECTOR PLAN 1
Admit to Ortho for Right EDUAR Admit to Orthopedic Service   For elective right total hip arthroplasty with Dr. Hwang   Medically cleared and optimized for surgery by Dr. Smith

## 2020-07-30 NOTE — H&P ADULT - NSHPPHYSICALEXAM_GEN_ALL_CORE
NAD. Alert/oriented    MSK:  limited ROM right hip secondary to pain        Rest of PE per medical clearance note Gen: 61 y/o male, well nourished, well developed, NAD  MSK: Decreased ROM secondary to pain at the right hip   calves soft, nontender bilaterally   sensation intact to light touch bilateral lower extremities  DP 2+,  brisk capillary refill  EHL/FHL/TA/GS 5/5 bilaterally     Rest of PE per medical clearance note

## 2020-07-31 ENCOUNTER — INPATIENT (INPATIENT)
Facility: HOSPITAL | Age: 60
LOS: 0 days | Discharge: HOME | DRG: 470 | End: 2020-08-01
Attending: ORTHOPAEDIC SURGERY | Admitting: ORTHOPAEDIC SURGERY
Payer: COMMERCIAL

## 2020-07-31 ENCOUNTER — APPOINTMENT (OUTPATIENT)
Dept: ORTHOPEDIC SURGERY | Facility: HOSPITAL | Age: 60
End: 2020-07-31

## 2020-07-31 ENCOUNTER — TRANSCRIPTION ENCOUNTER (OUTPATIENT)
Age: 60
End: 2020-07-31

## 2020-07-31 DIAGNOSIS — Z98.890 OTHER SPECIFIED POSTPROCEDURAL STATES: Chronic | ICD-10-CM

## 2020-07-31 DIAGNOSIS — M19.90 UNSPECIFIED OSTEOARTHRITIS, UNSPECIFIED SITE: ICD-10-CM

## 2020-07-31 DIAGNOSIS — G25.0 ESSENTIAL TREMOR: ICD-10-CM

## 2020-07-31 PROCEDURE — 27130 TOTAL HIP ARTHROPLASTY: CPT | Mod: RT

## 2020-07-31 PROCEDURE — 99223 1ST HOSP IP/OBS HIGH 75: CPT

## 2020-07-31 PROCEDURE — 72170 X-RAY EXAM OF PELVIS: CPT | Mod: 26

## 2020-07-31 RX ORDER — LANOLIN ALCOHOL/MO/W.PET/CERES
3 CREAM (GRAM) TOPICAL AT BEDTIME
Refills: 0 | Status: DISCONTINUED | OUTPATIENT
Start: 2020-07-31 | End: 2020-08-01

## 2020-07-31 RX ORDER — ACETAMINOPHEN 500 MG
1000 TABLET ORAL ONCE
Refills: 0 | Status: COMPLETED | OUTPATIENT
Start: 2020-07-31 | End: 2020-07-31

## 2020-07-31 RX ORDER — SENNA PLUS 8.6 MG/1
2 TABLET ORAL AT BEDTIME
Refills: 0 | Status: DISCONTINUED | OUTPATIENT
Start: 2020-07-31 | End: 2020-08-01

## 2020-07-31 RX ORDER — PRIMIDONE 250 MG/1
100 TABLET ORAL EVERY 24 HOURS
Refills: 0 | Status: DISCONTINUED | OUTPATIENT
Start: 2020-07-31 | End: 2020-08-01

## 2020-07-31 RX ORDER — ACETAMINOPHEN 500 MG
650 TABLET ORAL EVERY 6 HOURS
Refills: 0 | Status: DISCONTINUED | OUTPATIENT
Start: 2020-07-31 | End: 2020-08-01

## 2020-07-31 RX ORDER — OXYCODONE HYDROCHLORIDE 5 MG/1
10 TABLET ORAL EVERY 4 HOURS
Refills: 0 | Status: DISCONTINUED | OUTPATIENT
Start: 2020-07-31 | End: 2020-08-01

## 2020-07-31 RX ORDER — HYDROMORPHONE HYDROCHLORIDE 2 MG/ML
0.5 INJECTION INTRAMUSCULAR; INTRAVENOUS; SUBCUTANEOUS EVERY 4 HOURS
Refills: 0 | Status: DISCONTINUED | OUTPATIENT
Start: 2020-07-31 | End: 2020-08-01

## 2020-07-31 RX ORDER — LANOLIN ALCOHOL/MO/W.PET/CERES
3 CREAM (GRAM) TOPICAL ONCE
Refills: 0 | Status: COMPLETED | OUTPATIENT
Start: 2020-07-31 | End: 2020-07-31

## 2020-07-31 RX ORDER — CEFAZOLIN SODIUM 1 G
2000 VIAL (EA) INJECTION EVERY 8 HOURS
Refills: 0 | Status: COMPLETED | OUTPATIENT
Start: 2020-07-31 | End: 2020-07-31

## 2020-07-31 RX ORDER — ONDANSETRON 8 MG/1
4 TABLET, FILM COATED ORAL EVERY 6 HOURS
Refills: 0 | Status: DISCONTINUED | OUTPATIENT
Start: 2020-07-31 | End: 2020-08-01

## 2020-07-31 RX ORDER — GABAPENTIN 400 MG/1
600 CAPSULE ORAL ONCE
Refills: 0 | Status: COMPLETED | OUTPATIENT
Start: 2020-07-31 | End: 2020-07-31

## 2020-07-31 RX ORDER — ASPIRIN/CALCIUM CARB/MAGNESIUM 324 MG
81 TABLET ORAL
Refills: 0 | Status: DISCONTINUED | OUTPATIENT
Start: 2020-08-01 | End: 2020-08-01

## 2020-07-31 RX ORDER — BUPIVACAINE 13.3 MG/ML
20 INJECTION, SUSPENSION, LIPOSOMAL INFILTRATION ONCE
Refills: 0 | Status: DISCONTINUED | OUTPATIENT
Start: 2020-07-31 | End: 2020-08-01

## 2020-07-31 RX ORDER — POLYETHYLENE GLYCOL 3350 17 G/17G
17 POWDER, FOR SOLUTION ORAL DAILY
Refills: 0 | Status: DISCONTINUED | OUTPATIENT
Start: 2020-07-31 | End: 2020-08-01

## 2020-07-31 RX ORDER — OXYCODONE HYDROCHLORIDE 5 MG/1
5 TABLET ORAL EVERY 4 HOURS
Refills: 0 | Status: DISCONTINUED | OUTPATIENT
Start: 2020-07-31 | End: 2020-08-01

## 2020-07-31 RX ORDER — SODIUM CHLORIDE 9 MG/ML
1000 INJECTION, SOLUTION INTRAVENOUS
Refills: 0 | Status: DISCONTINUED | OUTPATIENT
Start: 2020-07-31 | End: 2020-08-01

## 2020-07-31 RX ORDER — CHLORHEXIDINE GLUCONATE 213 G/1000ML
1 SOLUTION TOPICAL ONCE
Refills: 0 | Status: COMPLETED | OUTPATIENT
Start: 2020-07-31 | End: 2020-07-31

## 2020-07-31 RX ORDER — CELECOXIB 200 MG/1
400 CAPSULE ORAL ONCE
Refills: 0 | Status: COMPLETED | OUTPATIENT
Start: 2020-07-31 | End: 2020-07-31

## 2020-07-31 RX ORDER — PRIMIDONE 250 MG/1
50 TABLET ORAL EVERY 24 HOURS
Refills: 0 | Status: DISCONTINUED | OUTPATIENT
Start: 2020-07-31 | End: 2020-08-01

## 2020-07-31 RX ORDER — HYDROMORPHONE HYDROCHLORIDE 2 MG/ML
0.5 INJECTION INTRAMUSCULAR; INTRAVENOUS; SUBCUTANEOUS
Refills: 0 | Status: DISCONTINUED | OUTPATIENT
Start: 2020-07-31 | End: 2020-08-01

## 2020-07-31 RX ORDER — MAGNESIUM HYDROXIDE 400 MG/1
30 TABLET, CHEWABLE ORAL DAILY
Refills: 0 | Status: DISCONTINUED | OUTPATIENT
Start: 2020-07-31 | End: 2020-08-01

## 2020-07-31 RX ADMIN — Medication 100 MILLIGRAM(S): at 23:22

## 2020-07-31 RX ADMIN — CHLORHEXIDINE GLUCONATE 1 APPLICATION(S): 213 SOLUTION TOPICAL at 05:55

## 2020-07-31 RX ADMIN — Medication 100 MILLIGRAM(S): at 15:34

## 2020-07-31 RX ADMIN — PRIMIDONE 100 MILLIGRAM(S): 250 TABLET ORAL at 19:28

## 2020-07-31 RX ADMIN — GABAPENTIN 600 MILLIGRAM(S): 400 CAPSULE ORAL at 06:31

## 2020-07-31 RX ADMIN — CELECOXIB 400 MILLIGRAM(S): 200 CAPSULE ORAL at 06:31

## 2020-07-31 RX ADMIN — Medication 3 MILLIGRAM(S): at 21:35

## 2020-07-31 RX ADMIN — OXYCODONE HYDROCHLORIDE 10 MILLIGRAM(S): 5 TABLET ORAL at 20:28

## 2020-07-31 RX ADMIN — Medication 1000 MILLIGRAM(S): at 06:30

## 2020-07-31 RX ADMIN — CELECOXIB 400 MILLIGRAM(S): 200 CAPSULE ORAL at 06:30

## 2020-07-31 RX ADMIN — Medication 1000 MILLIGRAM(S): at 06:31

## 2020-07-31 RX ADMIN — OXYCODONE HYDROCHLORIDE 10 MILLIGRAM(S): 5 TABLET ORAL at 23:47

## 2020-07-31 RX ADMIN — OXYCODONE HYDROCHLORIDE 10 MILLIGRAM(S): 5 TABLET ORAL at 19:28

## 2020-07-31 RX ADMIN — Medication 1 APPLICATION(S): at 06:31

## 2020-07-31 NOTE — DISCHARGE NOTE PROVIDER - CARE PROVIDER_API CALL
Diallo Hwang)  Orthopedics  [address] [place] [address]  Phone: [phone]  Fax: [phone]  Follow Up Time:

## 2020-07-31 NOTE — PROGRESS NOTE ADULT - SUBJECTIVE AND OBJECTIVE BOX
POST OPERATIVE DAY #: 0  STATUS POST: Right EDUAR                  SUBJECTIVE: Patient seen and examined. States to doing well with mild pain. Patient denies any CP, SOB, fever, chills, numbness/tingling.     Pain:  well controlled      OBJECTIVE:     Vital Signs Last 24 Hrs  T(C): 36.3 (31 Jul 2020 10:31), Max: 36.3 (31 Jul 2020 10:31)  T(F): 97.4 (31 Jul 2020 10:31), Max: 97.4 (31 Jul 2020 10:31)  HR: 52 (31 Jul 2020 12:28) (42 - 52)  BP: 122/78 (31 Jul 2020 12:28) (122/78 - 159/70)  BP(mean): 96 (31 Jul 2020 11:46) (91 - 101)  RR: 16 (31 Jul 2020 12:28) (13 - 16)  SpO2: 98% (31 Jul 2020 12:28) (97% - 100%)    Affected extremity:          Dressing: clean/dry/intact          Sensation: intact to light touch          Motor exam:  5/5 to EHL/TA/GS         warm, well-perfused; capillary refill < 3 seconds              I&O's Detail      LABS:                MEDICATIONS:  ceFAZolin   IVPB 2000 milliGRAM(s) IV Intermittent every 8 hours    acetaminophen   Tablet .. 650 milliGRAM(s) Oral every 6 hours PRN  HYDROmorphone  Injectable 0.5 milliGRAM(s) IV Push every 15 minutes PRN  HYDROmorphone  Injectable 0.5 milliGRAM(s) IV Push every 4 hours PRN  ondansetron Injectable 4 milliGRAM(s) IV Push every 6 hours PRN  oxyCODONE    IR 5 milliGRAM(s) Oral every 4 hours PRN  oxyCODONE    IR 10 milliGRAM(s) Oral every 4 hours PRN  primidone 100 milliGRAM(s) Oral every 24 hours  primidone 50 milliGRAM(s) Oral every 24 hours        RADIOLOGY & ADDITIONAL STUDIES:    ASSESSMENT AND PLAN: s/p Right EDUAR    1. Analgesic pain control  2. DVT prophylaxis: ASA          SCDs      Other:   4. Weight Bearing Status:  Weight bearing as tolerated     5. Disposition: Pending

## 2020-07-31 NOTE — DISCHARGE NOTE PROVIDER - NSDCCPCAREPLAN_GEN_ALL_CORE_FT
PRINCIPAL DISCHARGE DIAGNOSIS  Diagnosis: OA (osteoarthritis)  Assessment and Plan of Treatment: OA (osteoarthritis)

## 2020-07-31 NOTE — PHYSICAL THERAPY INITIAL EVALUATION ADULT - PERTINENT HX OF CURRENT PROBLEM, REHAB EVAL
60 y.o M with right hip pain x 6 months. PAtient denies known injury. Reports hip pain has gradually worsened. Rated as severe. Endorses stiffness, limited ROM, pain with weight bearing through RLE. Reports failure of conservative management including activity modification, oral analgesics. Patient denies associated numbness, tingling. use of a cane or walker. Presents for elective right total hip replacement surgery with Dr. Hwang.

## 2020-07-31 NOTE — DISCHARGE NOTE PROVIDER - NSDCFUSCHEDAPPT_GEN_ALL_CORE_FT
ALEXIS, ORAL ; 09/10/2020 ; [facility] [address]  ALEXIS, ORAL ; 09/17/2020 ; [facility] [address]

## 2020-07-31 NOTE — BRIEF OPERATIVE NOTE - NSICDXBRIEFPREOP_GEN_ALL_CORE_FT
PRE-OP DIAGNOSIS:  Osteonecrosis of right hip 31-Jul-2020 10:34:23  Marilyn Antony
PRE-OP DIAGNOSIS:  Osteonecrosis of right hip 31-Jul-2020 10:34:33  Augustine Abreu

## 2020-07-31 NOTE — CONSULT NOTE ADULT - ASSESSMENT
60YOM with history of essential tremor, chronic R hip pain admitted to ortho service for elective R total hip replacement.    Chronic R hip pain s/p R EDUAR  Postop care  s/p R EDUAR 7/31, doing well postop.  -postop care including pain management per ortho team  -PT/OT    Essential tremor  Takes primidone 50mg qam, 100mg qhs at home  -continue home meds

## 2020-07-31 NOTE — BRIEF OPERATIVE NOTE - NSICDXBRIEFPROCEDURE_GEN_ALL_CORE_FT
PROCEDURES:  Right total hip arthroplasty 31-Jul-2020 10:34:04  Marilyn Antony
PROCEDURES:  Right total hip arthroplasty 31-Jul-2020 10:33:52  Augustine Abreu

## 2020-07-31 NOTE — CONSULT NOTE ADULT - SUBJECTIVE AND OBJECTIVE BOX
CC: chronic R hip pain    HPI:  Per admission H&P  "60 y.o M with right hip pain     Presents for elective right total hip replacement surgery'    Patient was admitted to orthopedic surgery service and had right total hip replacement done 7/31. Patient seen on the floor postoperatively. He is doing well postop - no significant pain in R hip at this point but he says he feels like it will start hurting soon. No fever, chills, SOB, palpitations, nausea, vomiting. Able to move extremities without issue. Had some numbness in legs from OR but this is receding.    He has history of essential tremor for many years since he was a child, has been on primidone which has helped his symptoms significantly.    12 point ROS reviewed and negative except as otherwise stated in HPI and below    PAST MEDICAL & SURGICAL HISTORY:  Essential tremor  OA (osteoarthritis)  S/P biopsy: back, benign  S/P knee surgery: b/l knee ACL repair    SOCIAL HISTORY:  Tobacco: denies  Alcohol: denies  Illicit Drugs: denies  Living situation: with roommate  ADLs: independent, no need for walker/cane to ambulate    FAMILY HISTORY:  Father with HTN/cardiac arrest  Mother healthy    ALLERGIES:  morphine (Rash)  penicillin (Rash)      HOME MEDICATIONS:  primidone: 100 milligram(s) orally  in the evening   primidone 50 mg oral tablet: in the morning       Vital Signs Last 24 Hrs  T(F): 97.4 (31 Jul 2020 10:31), Max: 97.4 (31 Jul 2020 10:31)  HR: 52 (31 Jul 2020 12:28) (42 - 52)  BP: 122/78 (31 Jul 2020 12:28) (122/78 - 159/70)  RR: 16 (31 Jul 2020 12:28) (13 - 16)  SpO2: 98% (31 Jul 2020 12:28) (97% - 100%)    PHYSICAL EXAM:  GENERAL: pleasant, appropriate, no acute distress, well-groomed, well-developed  HEAD:  Atraumatic, Normocephalic  EYES: EOMI, PERRLA, conjunctiva and sclera clear  ENMT: No tonsillar erythema, exudates, or enlargement; Moist mucous membranes, Good dentition  NECK: Supple, No JVD  CHEST/LUNG: Clear to auscultation bilaterally; No rales, rhonchi, wheezing, or rubs  HEART: Regular rate and rhythm; S1/S2, No murmurs, rubs, or gallops  ABDOMEN: Soft, Nontender, Nondistended; Bowel sounds present  VASCULAR: Normal pulses, Normal capillary refill  EXTREMITIES:  2+ Peripheral Pulses, No cyanosis, No edema  LYMPH: No lymphadenopathy noted  SKIN: Warm, Intact. R hip with gauze/dressing in place without saturation of dressing  PSYCH: Normal mood and affect  NERVOUS SYSTEM:  A/O x3, CN 2-12 intact, No focal deficits    LABS:  pending, not resulted yet    RADIOLOGY & ADDITIONAL TESTS:  < from: Xray Chest PA/Lat Pre Surgical Testing 2 Views (07.15.20 @ 13:55) >  IMPRESSION:    Support Devices: None  Heart: Normal insize  Mediastinum: Normal in contour  Lungs/Airways: No consolidation or pleural effusion.  Musculoskeletal: Normal    < end of copied text >

## 2020-07-31 NOTE — DISCHARGE NOTE PROVIDER - NSDCMRMEDTOKEN_GEN_ALL_CORE_FT
primidone: 100 milligram(s) orally  in the evening   primidone 50 mg oral tablet: in the morning aspirin 81 mg oral delayed release tablet: 1 tab(s) orally 2 times a day  Dilaudid 2 mg oral tablet: 1 tab(s) orally every 6 hours MDD:4  Oxaydo 5 mg oral tablet: 1 tab(s) orally every 4 hours, As needed, Moderate Pain (4 - 6) MDD:4  primidone: 100 milligram(s) orally  in the evening   primidone 50 mg oral tablet: in the morning

## 2020-07-31 NOTE — PHYSICAL THERAPY INITIAL EVALUATION ADULT - ADDITIONAL COMMENTS
Pt reports being independent in PLOF and denied use of any AD. He lives with a roommate and has [address] his home. Pt reports he will be staying on first floor for some time but has 3 flights to access his bedroom.

## 2020-07-31 NOTE — PHYSICAL THERAPY INITIAL EVALUATION ADULT - PLANNED THERAPY INTERVENTIONS, PT EVAL
stair negotiation/strengthening/bed mobility training/postural re-education/balance training/gait training/transfer training

## 2020-07-31 NOTE — DISCHARGE NOTE PROVIDER - HOSPITAL COURSE
Admitted    Surgery - right total hip replacement    Sharal-op Antibiotics    Pain control    DVT prophylaxis    OOB/Physical Therapy

## 2020-07-31 NOTE — DISCHARGE NOTE PROVIDER - NSDCFUADDINST_GEN_ALL_CORE_FT
Weight bear as tolerated with assistive device.  No strenuous activity, heavy lifting, driving or returning to work until cleared by MD.  You may shower - dressing is water-resistant, no soaking in bathtubs.  Remove dressing after post op day 5-7, then leave incision open to air. Keep incision clean and dry.  Try to have regular bowel movements, take stool softener or laxative if necessary.  May take Pepcid or Prilosec for upset stomach.  May take Aleve or Naproxen instead of Meloxicam/Celebrex.  Swelling may travel all the way down leg to foot, this is normal and will subside in a few weeks.  Call to schedule an appt with Dr. Hwang for follow up, if you have staples or sutures they will be removed in office.  Contact your doctor if you experience: fever greater than 101.5, chills, chest pain, difficulty breathing, redness or excessive drainage around the incision, other concerns.  Follow up with your primary care provider.

## 2020-08-01 ENCOUNTER — TRANSCRIPTION ENCOUNTER (OUTPATIENT)
Age: 60
End: 2020-08-01

## 2020-08-01 VITALS
HEART RATE: 84 BPM | OXYGEN SATURATION: 97 % | TEMPERATURE: 99 F | RESPIRATION RATE: 15 BRPM | DIASTOLIC BLOOD PRESSURE: 73 MMHG | SYSTOLIC BLOOD PRESSURE: 126 MMHG

## 2020-08-01 LAB
ANION GAP SERPL CALC-SCNC: 8 MMOL/L — SIGNIFICANT CHANGE UP (ref 5–17)
BUN SERPL-MCNC: 18 MG/DL — SIGNIFICANT CHANGE UP (ref 7–23)
CALCIUM SERPL-MCNC: 9.2 MG/DL — SIGNIFICANT CHANGE UP (ref 8.4–10.5)
CHLORIDE SERPL-SCNC: 103 MMOL/L — SIGNIFICANT CHANGE UP (ref 96–108)
CO2 SERPL-SCNC: 28 MMOL/L — SIGNIFICANT CHANGE UP (ref 22–31)
CREAT SERPL-MCNC: 1.01 MG/DL — SIGNIFICANT CHANGE UP (ref 0.5–1.3)
GLUCOSE SERPL-MCNC: 116 MG/DL — HIGH (ref 70–99)
HCT VFR BLD CALC: 40 % — SIGNIFICANT CHANGE UP (ref 39–50)
HCV AB S/CO SERPL IA: 0.1 S/CO — SIGNIFICANT CHANGE UP
HCV AB SERPL-IMP: SIGNIFICANT CHANGE UP
HGB BLD-MCNC: 13 G/DL — SIGNIFICANT CHANGE UP (ref 13–17)
MCHC RBC-ENTMCNC: 32.5 GM/DL — SIGNIFICANT CHANGE UP (ref 32–36)
MCHC RBC-ENTMCNC: 33.9 PG — SIGNIFICANT CHANGE UP (ref 27–34)
MCV RBC AUTO: 104.2 FL — HIGH (ref 80–100)
NRBC # BLD: 0 /100 WBCS — SIGNIFICANT CHANGE UP (ref 0–0)
PLATELET # BLD AUTO: 210 K/UL — SIGNIFICANT CHANGE UP (ref 150–400)
POTASSIUM SERPL-MCNC: 3.8 MMOL/L — SIGNIFICANT CHANGE UP (ref 3.5–5.3)
POTASSIUM SERPL-SCNC: 3.8 MMOL/L — SIGNIFICANT CHANGE UP (ref 3.5–5.3)
RBC # BLD: 3.84 M/UL — LOW (ref 4.2–5.8)
RBC # FLD: 14.1 % — SIGNIFICANT CHANGE UP (ref 10.3–14.5)
SODIUM SERPL-SCNC: 139 MMOL/L — SIGNIFICANT CHANGE UP (ref 135–145)
WBC # BLD: 8.14 K/UL — SIGNIFICANT CHANGE UP (ref 3.8–10.5)
WBC # FLD AUTO: 8.14 K/UL — SIGNIFICANT CHANGE UP (ref 3.8–10.5)

## 2020-08-01 RX ORDER — OXYCODONE HYDROCHLORIDE 5 MG/1
1 TABLET ORAL
Qty: 20 | Refills: 0
Start: 2020-08-01

## 2020-08-01 RX ORDER — HYDROMORPHONE HYDROCHLORIDE 2 MG/ML
1 INJECTION INTRAMUSCULAR; INTRAVENOUS; SUBCUTANEOUS
Qty: 20 | Refills: 0
Start: 2020-08-01 | End: 2020-08-05

## 2020-08-01 RX ORDER — ASPIRIN/CALCIUM CARB/MAGNESIUM 324 MG
1 TABLET ORAL
Qty: 28 | Refills: 0
Start: 2020-08-01 | End: 2020-08-14

## 2020-08-01 RX ADMIN — OXYCODONE HYDROCHLORIDE 10 MILLIGRAM(S): 5 TABLET ORAL at 10:20

## 2020-08-01 RX ADMIN — PRIMIDONE 50 MILLIGRAM(S): 250 TABLET ORAL at 07:01

## 2020-08-01 RX ADMIN — OXYCODONE HYDROCHLORIDE 10 MILLIGRAM(S): 5 TABLET ORAL at 06:33

## 2020-08-01 RX ADMIN — Medication 81 MILLIGRAM(S): at 05:29

## 2020-08-01 RX ADMIN — OXYCODONE HYDROCHLORIDE 10 MILLIGRAM(S): 5 TABLET ORAL at 09:33

## 2020-08-01 RX ADMIN — OXYCODONE HYDROCHLORIDE 10 MILLIGRAM(S): 5 TABLET ORAL at 00:47

## 2020-08-01 RX ADMIN — OXYCODONE HYDROCHLORIDE 10 MILLIGRAM(S): 5 TABLET ORAL at 05:33

## 2020-08-01 NOTE — PROGRESS NOTE ADULT - SUBJECTIVE AND OBJECTIVE BOX
SUBJECTIVE: Patient seen and examined. Pain controlled.  Pt did well o/n  No f/c/n/v/cp/sob.     OBJECTIVE:  NAD  Vital Signs Last 24 Hrs  T(C): 37.1 (01 Aug 2020 08:55), Max: 37.1 (01 Aug 2020 08:55)  T(F): 98.7 (01 Aug 2020 08:55), Max: 98.7 (01 Aug 2020 08:55)  HR: 84 (01 Aug 2020 08:55) (42 - 84)  BP: 126/73 (01 Aug 2020 08:55) (122/78 - 159/70)  BP(mean): 96 (31 Jul 2020 11:46) (91 - 101)  RR: 15 (01 Aug 2020 08:55) (13 - 17)  SpO2: 97% (01 Aug 2020 08:55) (97% - 100%)    Physical Exam:  General: Resting comfortably in bed. AAOx3. NAD.  Extremities:        LLE: No gross deformity. SILT L2-S1 distribution, symmetric. TA/EHL/FHL/GS motor intact. WWP, DP 2+       RLE: Aquacel dressing c/d/i. SILT L2-S1 distribution, symmetric. TA/EHL/FHL/GS motor intact. WWP, DP 2+    Labs:             13.0   8.14  )-----------( 210      ( 01 Aug 2020 07:30 )             40.0     08-01    139  |  103  |  18  ----------------------------<  116<H>  3.8   |  28  |  1.01    Ca    9.2      01 Aug 2020 07:29      A/P :  Pt is a 59yo Male POD1 s/p R EDUAR, cleared PT, for home today  -    Pain control  -    DVT ppx: SCD     -    Weight bearing status: WBAT   -    Physical Therapy  -    Dispo: Home w HPT

## 2020-08-01 NOTE — DISCHARGE NOTE NURSING/CASE MANAGEMENT/SOCIAL WORK - PATIENT PORTAL LINK FT
You can access the FollowMyHealth Patient Portal offered by [facility] by registering at the following website: [email or web address]. By joining [employer]’s FollowMyHealth portal, you will also be able to view your health information using other applications (apps) compatible with our system.

## 2020-08-04 PROCEDURE — 72170 X-RAY EXAM OF PELVIS: CPT

## 2020-08-04 PROCEDURE — 85027 COMPLETE CBC AUTOMATED: CPT

## 2020-08-04 PROCEDURE — 86803 HEPATITIS C AB TEST: CPT

## 2020-08-04 PROCEDURE — 76000 FLUOROSCOPY <1 HR PHYS/QHP: CPT

## 2020-08-04 PROCEDURE — 97116 GAIT TRAINING THERAPY: CPT

## 2020-08-04 PROCEDURE — C1776: CPT

## 2020-08-04 PROCEDURE — 36415 COLL VENOUS BLD VENIPUNCTURE: CPT

## 2020-08-04 PROCEDURE — 80048 BASIC METABOLIC PNL TOTAL CA: CPT

## 2020-08-04 PROCEDURE — 97161 PT EVAL LOW COMPLEX 20 MIN: CPT

## 2020-08-05 DIAGNOSIS — M16.11 UNILATERAL PRIMARY OSTEOARTHRITIS, RIGHT HIP: ICD-10-CM

## 2020-08-05 DIAGNOSIS — M87.9 OSTEONECROSIS, UNSPECIFIED: ICD-10-CM
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
## 2020-08-05 DIAGNOSIS — M84.351A STRESS FRACTURE, RIGHT FEMUR, INITIAL ENCOUNTER FOR FRACTURE: ICD-10-CM

## 2020-08-05 DIAGNOSIS — G25.0 ESSENTIAL TREMOR: ICD-10-CM

## 2020-08-05 DIAGNOSIS — Z88.0 ALLERGY STATUS TO PENICILLIN: ICD-10-CM

## 2020-08-13 ENCOUNTER — APPOINTMENT (OUTPATIENT)
Dept: ORTHOPEDIC SURGERY | Facility: CLINIC | Age: 60
End: 2020-08-13
Payer: MEDICAID

## 2020-08-13 VITALS
SYSTOLIC BLOOD PRESSURE: 140 MMHG | HEIGHT: 69 IN | DIASTOLIC BLOOD PRESSURE: 80 MMHG | OXYGEN SATURATION: 99 % | WEIGHT: 175 LBS | BODY MASS INDEX: 25.92 KG/M2 | HEART RATE: 68 BPM

## 2020-08-13 PROCEDURE — 99024 POSTOP FOLLOW-UP VISIT: CPT

## 2020-08-13 RX ORDER — DOXYCYCLINE HYCLATE 100 MG/1
100 TABLET ORAL
Qty: 20 | Refills: 0 | Status: DISCONTINUED | COMMUNITY
Start: 2020-07-20 | End: 2020-08-13

## 2020-08-13 NOTE — HISTORY OF PRESENT ILLNESS
[Clean/Dry/Intact] : clean, dry and intact [Healed] : healed [Erythema] : not erythematous [Discharge] : absent of discharge [Swelling] : not swollen [Dehiscence] : not dehisced [Neuro Intact] : an unremarkable neurological exam [Vascular Intact] : ~T peripheral vascular exam normal [Negative Lizzette's] : maneuvers demonstrated a negative Lizzette's sign [de-identified] : First post op right total hip replacement, surgical date 7/31/2020 [de-identified] : Doing well, no complaints. Has completely weaned off oxycodone. Compliant with ASA. No longer needs cane. He declined home PT in favor of doing Force exercises. Starting outpatient PT next week. [de-identified] : Hip incision benign. ROM 0-100 flex, 10 ADD, 30 ABD, 10 IR, 45 ER. Able to SLR against resistance. Good quad strength. Smooth non-antalgic gait. Limb lengths clinically equal. [de-identified] : 61y/o male 2 weeks s/p R EDUAR, doing well [de-identified] : PT\par HEP\par RTC 4wk with new XRs\par [facility] study participant

## 2020-08-17 ENCOUNTER — APPOINTMENT (OUTPATIENT)
Dept: PHYSICAL MEDICINE AND REHAB | Facility: CLINIC | Age: 60
End: 2020-08-17
Payer: MEDICAID

## 2020-08-17 VITALS — WEIGHT: 175 LBS | OXYGEN SATURATION: 99 % | HEIGHT: 69 IN | RESPIRATION RATE: 16 BRPM | BODY MASS INDEX: 25.92 KG/M2

## 2020-08-17 PROCEDURE — 99214 OFFICE O/P EST MOD 30 MIN: CPT

## 2020-08-17 NOTE — HISTORY OF PRESENT ILLNESS
[FreeTextEntry1] : Mr. ORAL ESPINOZA is a 59 year male who is here for follow up evaluation of back pain radiating into his right>>left legs. The patient recently underwent R-EDUAR with Dr. Hwang which has completely eliminated his hip pain and returned him to normal activity. He feels that this has unfortunately aggravated his back/leg pain which radiates through his buttock, hamstring, calf, intermittent in nature and described as sharp and aching. The pain is rated as 3/10 during today's visit, and ranges from 3-7/10. The patient's symptoms are aggravated by standing extended periods of time  and alleviated by rest and stretching . The patient denies any night pain, numbness/tingling, weakness, or bowel/bladder dysfunction. The patient has no other complaints at this time.

## 2020-08-17 NOTE — PHYSICAL EXAM
[FreeTextEntry1] : GEN: AAOx3, NAD.\par PSYCH: Normal mood and affect. Responds appropriately to commands.\par EYES: Sclerae Anicteric. No discharge. EOMI.\par RESP: Breathing unlabored.\par CV: DP pulses 2+ and equal. No varicosities noted.\par EXT: No C/C/E.\par SKIN: No lesions noted.\par STRENGTH: 5/5 bilateral hip flexors, knee extensors, knee flexors, ankle dorsiflexors, long toe extensors, ankle plantar flexors, hip extensors, hip abductors.\par TONE: Normal, No clonus.\par REFLEXES: 2+ symmetric patella, medial hamstring, achilles. Plantars downgoing bilaterally.\par SENS: Grossly intact to light touch bilateral lower extremities.\par INSP: Spine alignment is midline, with no evidence of scoliosis.\par STANCE: No Trendelenburg with single leg stance.\par GAIT: Non antalgic, normal reciprocating heel to toe\par LUMBAR ROM: Flexion full/pain free. Extension and oblique extension full (+) axial/LE Sx.\par PALP: There is no tenderness over the midline spinous processes, paravertebral muscles, SIJ, or greater trochanters bilaterally.\par SPECIAL: SLR and Slump test negative bilaterally. \par

## 2020-08-17 NOTE — ASSESSMENT
[FreeTextEntry1] : Impression:\par 1. Bilateral S1 Radiculopathy\par 2. Lumbar Facet Syndrome\par \par Plan: Review of the history and imaging, the patient's current symptoms seem to be consistent with a combination of B-S1 Radiculopathy and Facet arthrosis. The imaging results and diagnosis were reviewed with the patient. We discussed all the potential treatment options including physical therapy, oral medication, interventional spine procedures, and surgery; as well as alternative therapeutics such as acupuncture and massage. We also discussed the importance of weight loss, ergonomics, and posture in the long term management of the condition. At this time the patient is interested in interventional options for symptom reduction. We discussed all the risks, benefits, alternative treatments, and prognosis. The patient expressed understanding and would like to move forward. We will schedule for the injection as well as follow up post-procedure. We will plan for Bilateral L4/5 L5/S1 Facet CSI. The patient expressed verbal understanding and is in agreement with the plan of care. All of the patient's questions and concerns were addressed during today's visit.\par

## 2020-08-24 ENCOUNTER — NON-APPOINTMENT (OUTPATIENT)
Age: 60
End: 2020-08-24

## 2020-08-27 ENCOUNTER — APPOINTMENT (OUTPATIENT)
Dept: PHYSICAL MEDICINE AND REHAB | Facility: CLINIC | Age: 60
End: 2020-08-27
Payer: MEDICAID

## 2020-08-27 VITALS — HEIGHT: 69 IN | BODY MASS INDEX: 25.92 KG/M2 | TEMPERATURE: 98.1 F | RESPIRATION RATE: 16 BRPM | WEIGHT: 175 LBS

## 2020-08-27 DIAGNOSIS — M47.817 SPONDYLOSIS W/OUT MYELOPATHY OR RADICULOPATHY, LUMBOSACRAL REGION: ICD-10-CM

## 2020-08-27 PROCEDURE — 64493 INJ PARAVERT F JNT L/S 1 LEV: CPT | Mod: 50

## 2020-08-27 PROCEDURE — 64494 INJ PARAVERT F JNT L/S 2 LEV: CPT | Mod: 50

## 2020-08-30 PROBLEM — M47.817 FACET DEGENERATION OF LUMBOSACRAL REGION: Status: ACTIVE | Noted: 2020-08-17

## 2020-09-10 ENCOUNTER — TRANSCRIPTION ENCOUNTER (OUTPATIENT)
Age: 60
End: 2020-09-10

## 2020-09-10 ENCOUNTER — APPOINTMENT (OUTPATIENT)
Dept: DERMATOLOGY | Facility: CLINIC | Age: 60
End: 2020-09-10
Payer: MEDICAID

## 2020-09-10 ENCOUNTER — LABORATORY RESULT (OUTPATIENT)
Age: 60
End: 2020-09-10

## 2020-09-10 VITALS — DIASTOLIC BLOOD PRESSURE: 85 MMHG | SYSTOLIC BLOOD PRESSURE: 129 MMHG

## 2020-09-10 PROCEDURE — 11406 EXC TR-EXT B9+MARG >4.0 CM: CPT

## 2020-09-10 PROCEDURE — 12034 INTMD RPR S/TR/EXT 7.6-12.5: CPT

## 2020-09-16 ENCOUNTER — APPOINTMENT (OUTPATIENT)
Dept: ORTHOPEDIC SURGERY | Facility: CLINIC | Age: 60
End: 2020-09-16
Payer: MEDICAID

## 2020-09-16 VITALS
SYSTOLIC BLOOD PRESSURE: 134 MMHG | DIASTOLIC BLOOD PRESSURE: 87 MMHG | WEIGHT: 185 LBS | HEART RATE: 73 BPM | HEIGHT: 69 IN | OXYGEN SATURATION: 98 % | BODY MASS INDEX: 27.4 KG/M2

## 2020-09-16 PROCEDURE — 99024 POSTOP FOLLOW-UP VISIT: CPT

## 2020-09-16 PROCEDURE — 73502 X-RAY EXAM HIP UNI 2-3 VIEWS: CPT | Mod: RT

## 2020-09-16 RX ORDER — CELECOXIB 200 MG/1
200 CAPSULE ORAL TWICE DAILY
Qty: 60 | Refills: 2 | Status: DISCONTINUED | COMMUNITY
Start: 2020-07-31 | End: 2020-09-16

## 2020-09-16 RX ORDER — ACETAMINOPHEN 500 MG/1
500 TABLET ORAL
Qty: 180 | Refills: 1 | Status: DISCONTINUED | COMMUNITY
Start: 2020-07-31 | End: 2020-09-16

## 2020-09-16 RX ORDER — ASPIRIN ENTERIC COATED TABLETS 81 MG 81 MG/1
81 TABLET, DELAYED RELEASE ORAL
Qty: 60 | Refills: 0 | Status: DISCONTINUED | COMMUNITY
Start: 2020-07-31 | End: 2020-09-16

## 2020-09-16 RX ORDER — OXYCODONE 5 MG/1
5 TABLET ORAL
Qty: 50 | Refills: 0 | Status: DISCONTINUED | COMMUNITY
Start: 2020-07-31 | End: 2020-09-16

## 2020-09-16 RX ORDER — PANTOPRAZOLE 40 MG/1
40 TABLET, DELAYED RELEASE ORAL DAILY
Qty: 30 | Refills: 1 | Status: DISCONTINUED | COMMUNITY
Start: 2020-07-31 | End: 2020-09-16

## 2020-09-16 NOTE — HISTORY OF PRESENT ILLNESS
[Neuro Intact] : an unremarkable neurological exam [Vascular Intact] : ~T peripheral vascular exam normal [Negative Lizzette's] : maneuvers demonstrated a negative Lizzette's sign [de-identified] : Second post op right total hip replacement, surgical date 7/31/2020. [de-identified] : Doing well overall. Reports complete resolution of right hip pain, though now with exacerbation of low back pain and buttock pain more on the right. He has been seeing Dr. Lee for this and is taking PT and injections. Off all pain meds. Has been taking swims of up to a mile for exercise. No wound problems. [de-identified] : Right hip incision healed without erythema, fluctuance, or dehiscence. No significant limb swelling. Hip ROM 0-120 flexion, 25 IR, 60 ER, 20 adduction, 45 abduction. 5/5 strength in hip flexion and abduction. Limb lengths clinically equal. Normal gait.  [de-identified] : AP pelvis, AP right hip, and false profile right hip x-rays were taken today and interpreted by me. The right total hip arthroplasty is in unchanged position as compared to postop films. Components appear well fixed without evidence of osteolysis, loosening, or fracture. Limb lengths and offset appear acceptable. [de-identified] : 61y/o male about 6 weeks s/p R EDUAR, doing well [de-identified] : Cont PT/HEP\par Cont other treatments for low back issues as per Dr. 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

## 2020-09-17 ENCOUNTER — APPOINTMENT (OUTPATIENT)
Dept: DERMATOLOGY | Facility: CLINIC | Age: 60
End: 2020-09-17

## 2020-09-17 ENCOUNTER — APPOINTMENT (OUTPATIENT)
Dept: PHYSICAL MEDICINE AND REHAB | Facility: CLINIC | Age: 60
End: 2020-09-17
Payer: MEDICAID

## 2020-09-17 PROCEDURE — 99443: CPT

## 2020-09-21 ENCOUNTER — LABORATORY RESULT (OUTPATIENT)
Age: 60
End: 2020-09-21

## 2020-09-21 ENCOUNTER — APPOINTMENT (OUTPATIENT)
Dept: DERMATOLOGY | Facility: CLINIC | Age: 60
End: 2020-09-21
Payer: MEDICAID

## 2020-09-21 DIAGNOSIS — B35.3 TINEA PEDIS: ICD-10-CM

## 2020-09-21 DIAGNOSIS — Z80.8 FAMILY HISTORY OF MALIGNANT NEOPLASM OF OTHER ORGANS OR SYSTEMS: ICD-10-CM

## 2020-09-21 DIAGNOSIS — Z48.02 ENCOUNTER FOR REMOVAL OF SUTURES: ICD-10-CM

## 2020-09-21 PROCEDURE — 99214 OFFICE O/P EST MOD 30 MIN: CPT | Mod: 25

## 2020-09-21 PROCEDURE — 11103 TANGNTL BX SKIN EA SEP/ADDL: CPT | Mod: 59

## 2020-09-21 PROCEDURE — 11102 TANGNTL BX SKIN SINGLE LES: CPT

## 2020-09-21 RX ORDER — VALACYCLOVIR 1 G/1
1 TABLET, FILM COATED ORAL
Qty: 30 | Refills: 0 | Status: ACTIVE | COMMUNITY
Start: 2020-09-14

## 2020-09-28 ENCOUNTER — APPOINTMENT (OUTPATIENT)
Dept: MRI IMAGING | Facility: CLINIC | Age: 60
End: 2020-09-28
Payer: MEDICAID

## 2020-09-28 ENCOUNTER — OUTPATIENT (OUTPATIENT)
Dept: OUTPATIENT SERVICES | Facility: HOSPITAL | Age: 60
LOS: 1 days | End: 2020-09-28

## 2020-09-28 DIAGNOSIS — Z98.890 OTHER SPECIFIED POSTPROCEDURAL STATES: Chronic | ICD-10-CM

## 2020-09-28 PROCEDURE — 72148 MRI LUMBAR SPINE W/O DYE: CPT | Mod: 26

## 2020-10-09 ENCOUNTER — APPOINTMENT (OUTPATIENT)
Dept: PHYSICAL MEDICINE AND REHAB | Facility: CLINIC | Age: 60
End: 2020-10-09
Payer: MEDICAID

## 2020-10-09 DIAGNOSIS — M51.17 INTERVERTEBRAL DISC DISORDERS WITH RADICULOPATHY, LUMBOSACRAL REGION: ICD-10-CM

## 2020-10-09 PROCEDURE — 99443: CPT

## 2020-10-19 PROBLEM — M51.17 INTERVERTEBRAL DISC DISORDERS WITH RADICULOPATHY, LUMBOSACRAL REGION: Status: ACTIVE | Noted: 2020-02-24

## 2020-10-21 ENCOUNTER — APPOINTMENT (OUTPATIENT)
Dept: ORTHOPEDIC SURGERY | Facility: CLINIC | Age: 60
End: 2020-10-21
Payer: MEDICAID

## 2020-10-21 VITALS
OXYGEN SATURATION: 97 % | TEMPERATURE: 97.3 F | HEART RATE: 81 BPM | BODY MASS INDEX: 25.92 KG/M2 | WEIGHT: 175 LBS | DIASTOLIC BLOOD PRESSURE: 78 MMHG | HEIGHT: 69 IN | SYSTOLIC BLOOD PRESSURE: 140 MMHG

## 2020-10-21 DIAGNOSIS — M51.36 OTHER INTERVERTEBRAL DISC DEGENERATION, LUMBAR REGION: ICD-10-CM

## 2020-10-21 DIAGNOSIS — M48.07 SPINAL STENOSIS, LUMBOSACRAL REGION: ICD-10-CM

## 2020-10-21 PROCEDURE — 99215 OFFICE O/P EST HI 40 MIN: CPT | Mod: 57

## 2020-10-21 PROCEDURE — 99072 ADDL SUPL MATRL&STAF TM PHE: CPT

## 2020-10-28 ENCOUNTER — RX RENEWAL (OUTPATIENT)
Age: 60
End: 2020-10-28

## 2020-10-30 ENCOUNTER — RX RENEWAL (OUTPATIENT)
Age: 60
End: 2020-10-30

## 2020-10-30 PROBLEM — M48.07 FORAMINAL STENOSIS OF LUMBOSACRAL REGION: Status: ACTIVE | Noted: 2020-10-30

## 2020-10-30 NOTE — HISTORY OF PRESENT ILLNESS
[de-identified] : Mr. ESPINOZA is a very pleasant 60 year old male who complains of severe lower back pain radiating to his bilateral posterior and lateral thighs.  Patient reports that these symptoms have been present for many years but has become worse over the past year.  He has been treated by Dr. Vazquez Lee of physiatry.  He has failed course of physicain supervised physical therapy, epidural steroid injections x 1, and lumbar facet injections.  He has failed courses of oral antiinflammatory medications.  His back and radiating leg pains have become severe and limit him from activities of daily living.  Patient  reports intermittent lower extremity paresthesias.  Pain aggrevated by standing, aleviated with sitting and lying down.\par \par The patient reports no loss of hand dexterity.\par The patient states there no loss of balance when walking.\par There no sensory loss in the arms or legs\par The patent no difficulty with urination.\par \par The patient no history of previous spine surgery.\par \par The patient has no history of unexpected weight loss, no history of active cancer, no history bladder or bowel dysfunction, no night pain, no fevers or chills.\par \par The past medical history, surgical history, family history, allergies, medications, review of systems, family history and social history were reviewed and non contributory.

## 2020-10-30 NOTE — PHYSICAL EXAM
[de-identified] : Physical Exam:\par \par General: patient is well developed, well nourished, in no acute \par distress, alert and oriented x 3. \par \par Mood and affect: normal\par \par Respiratory: no respiratory distress noted\par \par Skin: no scars over spine, skin intact, no erythema, increased warmth\par \par Alignment:The spine is well compensated in the coronal and sagittal plane.  There is no asymmetry on the muñoz forward bend test\par \par Gait: The patient is able to toe walk and heel walk without difficulty. The patient is able to tandem gait without difficutly.\par \par Palpation: no tenderness to palpation spine or paraspinal region\par \par Range of motion: Lumbar spine ROM is restricted\par \par Neurologic Exam:\par Motor: Manual Muscle testing in the lower extremities is 5 out of 5 in all muscle groups. There is no evidence of muscular atrophy in the lower extremities. Sensory: Sensation to light touch is grossly intact in the lower extremities\par \par Reflexes: DTR are present and symmetric throughout, negative arboleda bilaterally, negative inverted radial reflex bilaterally, no clonus, plantar responses are flexor\par \par Hip Exam: No pain with internal or external rotation of hips bilaterally\par \par Special tests: Straight leg raise test negative.  Cross straight leg test negative.  ANAM test negative\par \par Vascular: Examination of the peripheral vascular system demonstrates no evidence of congestion or edema. no evidence of lymphadema bilateral lower extremities, pulses are present and symmetric in both lower extremities. [de-identified] : MRI 9/28/20: severe degenerative disc disease with severe loss of height at L5/S1.  at this level there is severe foraminal bilateral stenosis with compression of the bilateral exiting L5 nerve roots.  Small disc bulge (non compressive at L4/5).  Small disc herniation at L3/4 without significant nerve compression.

## 2020-10-30 NOTE — DISCUSSION/SUMMARY
[de-identified] : I have discussed my findings extensively with the patient.  The patient is suffering from worsening long standing lumbosacral back pain radiating to his thighs.  This is stemming from severe foraminal stenosis bilaterally of L5/S1 with compression of the L5 nerve roots.  He has failed extensive non operative treatment including physical therapy, epidural steroid injections, and facet injections.  Given the severe foraminal stenosis, we discussed that two options would be bilateral complete facetectomies (which would necessitate fusion due to iatrogenic destabilzation of the level with foraminotomies at L5/S1 with concomittant TLIF and posterior fusion/instrumentation.  The other option would be anterior interbody fusion for better reconstruction of the disc space and indirect decompression of the nerve roots at L5/S1 with concomittant posterior instrumentation/fusion.  We discussed that with the anterior option this may avoid the need for direct decompression though if at the time of surgery did not get adequate foraminal height restoration would pursue decompression at the same time posteriorly with fusion.  We discussed the risks/benefits/alternatives extensively.  The patient elected to pursue with surgery. We will proceed with Anterior lumbar interbody fusion of L5/S1 with cage and L5/S1 posterior instrumentation/fusion with possible bilateral facetectomy/foraminotomies.  Patient will require CT lumbar spine preoperatively along with standard medical work up.  All questions answered.

## 2020-11-06 ENCOUNTER — NON-APPOINTMENT (OUTPATIENT)
Age: 60
End: 2020-11-06

## 2020-11-06 ENCOUNTER — APPOINTMENT (OUTPATIENT)
Dept: RADIOLOGY | Facility: CLINIC | Age: 60
End: 2020-11-06
Payer: MEDICAID

## 2020-11-06 ENCOUNTER — APPOINTMENT (OUTPATIENT)
Dept: VASCULAR SURGERY | Facility: CLINIC | Age: 60
End: 2020-11-06
Payer: MEDICAID

## 2020-11-06 ENCOUNTER — OUTPATIENT (OUTPATIENT)
Dept: OUTPATIENT SERVICES | Facility: HOSPITAL | Age: 60
LOS: 1 days | End: 2020-11-06

## 2020-11-06 ENCOUNTER — APPOINTMENT (OUTPATIENT)
Dept: CT IMAGING | Facility: CLINIC | Age: 60
End: 2020-11-06
Payer: MEDICAID

## 2020-11-06 DIAGNOSIS — Z98.890 OTHER SPECIFIED POSTPROCEDURAL STATES: Chronic | ICD-10-CM

## 2020-11-06 PROCEDURE — 72131 CT LUMBAR SPINE W/O DYE: CPT | Mod: 26

## 2020-11-06 PROCEDURE — 99072 ADDL SUPL MATRL&STAF TM PHE: CPT

## 2020-11-06 PROCEDURE — 99204 OFFICE O/P NEW MOD 45 MIN: CPT

## 2020-11-06 PROCEDURE — 72110 X-RAY EXAM L-2 SPINE 4/>VWS: CPT | Mod: 26

## 2020-11-09 ENCOUNTER — LABORATORY RESULT (OUTPATIENT)
Age: 60
End: 2020-11-09

## 2020-11-09 NOTE — ASSESSMENT
[FreeTextEntry1] : 59 y/o M presents for medical clearance for upcoming spinal surgery from L5-S1. On exam, general exam benign. Vascular surgery approach discussed at great length with patient.  The risks, benefits, convalescence and alternatives were reviewed. Numerous questions were asked and answered to the patients satisfaction. From a vascular surgery stand point pt is cleared. To call the office in case of having any further questions.

## 2020-11-09 NOTE — HISTORY OF PRESENT ILLNESS
[FreeTextEntry1] : 61 y/o M w/ severe back pain radiating to b/l posterior thigh. Pt presents for clearance for spinal surgery scheduled for Thursday by Dr. Langston. He reports here feeling well overall. Denies: fever, chills, n/v/d, abdominal pain.

## 2020-11-09 NOTE — ADDENDUM
[FreeTextEntry1] : This note was written by Opal Mccain on 11/06/2020 acting as scribe for Rudy Cash M.D.\par \par I, Rudy Sun have read and attest that all the information, medical decision making and discharge instructions within are true and accurate.

## 2020-11-09 NOTE — PHYSICAL EXAM
[Respiratory Effort] : normal respiratory effort [Normal Rate and Rhythm] : normal rate and rhythm [2+] : left 2+ [No Rash or Lesion] : No rash or lesion [Alert] : alert [Oriented to Person] : oriented to person [Oriented to Place] : oriented to place [Oriented to Time] : oriented to time [Calm] : calm [Ankle Swelling (On Exam)] : not present [Varicose Veins Of Lower Extremities] : not present [] : not present [Abdomen Tenderness] : ~T ~M No abdominal tenderness [de-identified] : Well appearing in no apparent acute distress  [de-identified] : NC/AT  [de-identified] : Supple  [de-identified] : FROM

## 2020-11-10 ENCOUNTER — APPOINTMENT (OUTPATIENT)
Dept: UROLOGY | Facility: CLINIC | Age: 60
End: 2020-11-10

## 2020-11-11 ENCOUNTER — TRANSCRIPTION ENCOUNTER (OUTPATIENT)
Age: 60
End: 2020-11-11

## 2020-11-11 VITALS
DIASTOLIC BLOOD PRESSURE: 79 MMHG | SYSTOLIC BLOOD PRESSURE: 118 MMHG | TEMPERATURE: 98 F | OXYGEN SATURATION: 99 % | HEART RATE: 71 BPM | HEIGHT: 70 IN | WEIGHT: 187.17 LBS | RESPIRATION RATE: 16 BRPM

## 2020-11-11 RX ORDER — POVIDONE-IODINE 5 %
1 AEROSOL (ML) TOPICAL ONCE
Refills: 0 | Status: DISCONTINUED | OUTPATIENT
Start: 2020-11-12 | End: 2020-11-12

## 2020-11-11 RX ORDER — PRIMIDONE 250 MG/1
0 TABLET ORAL
Qty: 0 | Refills: 0 | DISCHARGE

## 2020-11-11 RX ORDER — PRIMIDONE 250 MG/1
100 TABLET ORAL
Qty: 0 | Refills: 0 | DISCHARGE

## 2020-11-11 NOTE — PRE-OP CHECKLIST - SELECT TESTS ORDERED
INR/BMP/COVID/CBC/CXR/EKG/PT/PTT/Urinalysis Urinalysis/PT/PTT/CBC/INR/BMP/COVID/Type and Screen/EKG/CXR

## 2020-11-11 NOTE — H&P ADULT - NSHPPHYSICALEXAM_GEN_ALL_CORE
MSK: decreased ROM of lumbar spine secondary to pain    Rest of PE per medical clearance MSK: decreased ROM of lumbar spine secondary to pain  Back:  No TTP appreciated    LE b/l  Muscle strength 5/5 to EHL/TA/GS  Sensation intact to light touch  Compartments are soft and compressible b/l, nonTTP  Pedal pulse 2+    Rest of PE per medical clearance

## 2020-11-11 NOTE — H&P ADULT - NSICDXPASTSURGICALHX_GEN_ALL_CORE_FT
PAST SURGICAL HISTORY:  History of hip replacement, total, right     S/P biopsy back, benign cyst    S/P knee surgery b/l knee ACL repair

## 2020-11-11 NOTE — H&P ADULT - NSHPLABSRESULTS_GEN_ALL_CORE
Preop CBC, BMP, PT/INR, PTT, UA WNL  Preop CXR WNL per clearance   Preop EKG WNL per clearance  COVID swab on 11/9 - negative  3M DOS  T&S DOS

## 2020-11-11 NOTE — H&P ADULT - HISTORY OF PRESENT ILLNESS
59 yo M with back pain x     Presents for elective L5-S1 ALIF/PSF, possible facetectomy, foraminotomy, possible percutaneous screws. 59 yo M with back pain x     Presents for elective L5-S1 ALIF, L5-S1 PSF, instrumentation, possible facetectomy/foraminotomy, possible percutaneous screws 61 yo M with back pain x numerous years worsening. Pain radiates down b/l legs. Denies any weakness, n/t. Denies cp, sob, fever, chills. Patient has tried therapy, oral meds and injections    Presents for elective L5-S1 ALIF, L5-S1 PSF, instrumentation, possible facetectomy/foraminotomy, possible percutaneous screws

## 2020-11-11 NOTE — H&P ADULT - PROBLEM SELECTOR PLAN 1
Admit to Orthopedic service  For elective ALIF/PSF T10-L2, HWR L2-S1, lami L1-2, Osteotomy  Medically cleared for surgery by Admit to Orthopedic service  For elective L5-S1 ALIF, L5-S1 PSF, instrumentation, possible facetectomy/foraminotomy, possible percutaneous screws  Medically cleared for surgery by Dr. Smith and Dr. Mcnamara

## 2020-11-12 ENCOUNTER — INPATIENT (INPATIENT)
Facility: HOSPITAL | Age: 60
LOS: 0 days | Discharge: HOME | DRG: 455 | End: 2020-11-13
Attending: ORTHOPAEDIC SURGERY | Admitting: ORTHOPAEDIC SURGERY
Payer: COMMERCIAL

## 2020-11-12 ENCOUNTER — APPOINTMENT (OUTPATIENT)
Dept: ORTHOPEDIC SURGERY | Facility: HOSPITAL | Age: 60
End: 2020-11-12

## 2020-11-12 DIAGNOSIS — Z96.641 PRESENCE OF RIGHT ARTIFICIAL HIP JOINT: ICD-10-CM

## 2020-11-12 DIAGNOSIS — M54.16 RADICULOPATHY, LUMBAR REGION: ICD-10-CM

## 2020-11-12 DIAGNOSIS — Z96.641 PRESENCE OF RIGHT ARTIFICIAL HIP JOINT: Chronic | ICD-10-CM

## 2020-11-12 DIAGNOSIS — Z98.890 OTHER SPECIFIED POSTPROCEDURAL STATES: Chronic | ICD-10-CM

## 2020-11-12 DIAGNOSIS — G25.0 ESSENTIAL TREMOR: ICD-10-CM

## 2020-11-12 DIAGNOSIS — M54.9 DORSALGIA, UNSPECIFIED: ICD-10-CM

## 2020-11-12 DIAGNOSIS — M48.07 SPINAL STENOSIS, LUMBOSACRAL REGION: ICD-10-CM

## 2020-11-12 DIAGNOSIS — M54.5 LOW BACK PAIN: ICD-10-CM

## 2020-11-12 DIAGNOSIS — M51.17 INTERVERTEBRAL DISC DISORDERS WITH RADICULOPATHY, LUMBOSACRAL REGION: ICD-10-CM

## 2020-11-12 LAB
BLD GP AB SCN SERPL QL: NEGATIVE — SIGNIFICANT CHANGE UP
RH IG SCN BLD-IMP: POSITIVE — SIGNIFICANT CHANGE UP

## 2020-11-12 PROCEDURE — 22853 INSJ BIOMECHANICAL DEVICE: CPT

## 2020-11-12 PROCEDURE — 61783 SCAN PROC SPINAL: CPT | Mod: 59

## 2020-11-12 PROCEDURE — 72131 CT LUMBAR SPINE W/O DYE: CPT | Mod: 26

## 2020-11-12 PROCEDURE — 22558 ARTHRD ANT NTRBD MIN DSC LUM: CPT | Mod: GC,62

## 2020-11-12 PROCEDURE — 22840 INSERT SPINE FIXATION DEVICE: CPT

## 2020-11-12 PROCEDURE — 22612 ARTHRD PST TQ 1NTRSPC LUMBAR: CPT

## 2020-11-12 PROCEDURE — 22558 ARTHRD ANT NTRBD MIN DSC LUM: CPT | Mod: 62

## 2020-11-12 RX ORDER — DEXAMETHASONE 0.5 MG/5ML
6 ELIXIR ORAL EVERY 6 HOURS
Refills: 0 | Status: DISCONTINUED | OUTPATIENT
Start: 2020-11-12 | End: 2020-11-13

## 2020-11-12 RX ORDER — ONDANSETRON 8 MG/1
4 TABLET, FILM COATED ORAL EVERY 6 HOURS
Refills: 0 | Status: DISCONTINUED | OUTPATIENT
Start: 2020-11-12 | End: 2020-11-13

## 2020-11-12 RX ORDER — GABAPENTIN 400 MG/1
300 CAPSULE ORAL ONCE
Refills: 0 | Status: COMPLETED | OUTPATIENT
Start: 2020-11-12 | End: 2020-11-12

## 2020-11-12 RX ORDER — BUPIVACAINE 13.3 MG/ML
20 INJECTION, SUSPENSION, LIPOSOMAL INFILTRATION ONCE
Refills: 0 | Status: DISCONTINUED | OUTPATIENT
Start: 2020-11-12 | End: 2020-11-13

## 2020-11-12 RX ORDER — SENNA PLUS 8.6 MG/1
2 TABLET ORAL AT BEDTIME
Refills: 0 | Status: DISCONTINUED | OUTPATIENT
Start: 2020-11-12 | End: 2020-11-13

## 2020-11-12 RX ORDER — HYDROMORPHONE HYDROCHLORIDE 2 MG/ML
0.5 INJECTION INTRAMUSCULAR; INTRAVENOUS; SUBCUTANEOUS
Refills: 0 | Status: DISCONTINUED | OUTPATIENT
Start: 2020-11-12 | End: 2020-11-12

## 2020-11-12 RX ORDER — CHLORHEXIDINE GLUCONATE 213 G/1000ML
1 SOLUTION TOPICAL ONCE
Refills: 0 | Status: DISCONTINUED | OUTPATIENT
Start: 2020-11-12 | End: 2020-11-12

## 2020-11-12 RX ORDER — APREPITANT 80 MG/1
40 CAPSULE ORAL ONCE
Refills: 0 | Status: COMPLETED | OUTPATIENT
Start: 2020-11-12 | End: 2020-11-12

## 2020-11-12 RX ORDER — SODIUM CHLORIDE 9 MG/ML
1000 INJECTION, SOLUTION INTRAVENOUS
Refills: 0 | Status: DISCONTINUED | OUTPATIENT
Start: 2020-11-12 | End: 2020-11-13

## 2020-11-12 RX ORDER — OXYCODONE HYDROCHLORIDE 5 MG/1
5 TABLET ORAL EVERY 4 HOURS
Refills: 0 | Status: DISCONTINUED | OUTPATIENT
Start: 2020-11-12 | End: 2020-11-13

## 2020-11-12 RX ORDER — ACETAMINOPHEN 500 MG
1000 TABLET ORAL EVERY 8 HOURS
Refills: 0 | Status: DISCONTINUED | OUTPATIENT
Start: 2020-11-12 | End: 2020-11-13

## 2020-11-12 RX ORDER — METHOCARBAMOL 500 MG/1
500 TABLET, FILM COATED ORAL EVERY 8 HOURS
Refills: 0 | Status: DISCONTINUED | OUTPATIENT
Start: 2020-11-12 | End: 2020-11-13

## 2020-11-12 RX ORDER — ACETAMINOPHEN 500 MG
1000 TABLET ORAL ONCE
Refills: 0 | Status: COMPLETED | OUTPATIENT
Start: 2020-11-12 | End: 2020-11-12

## 2020-11-12 RX ORDER — POLYETHYLENE GLYCOL 3350 17 G/17G
17 POWDER, FOR SOLUTION ORAL DAILY
Refills: 0 | Status: DISCONTINUED | OUTPATIENT
Start: 2020-11-12 | End: 2020-11-13

## 2020-11-12 RX ORDER — PRIMIDONE 250 MG/1
50 TABLET ORAL
Refills: 0 | Status: DISCONTINUED | OUTPATIENT
Start: 2020-11-12 | End: 2020-11-13

## 2020-11-12 RX ORDER — METOCLOPRAMIDE HCL 10 MG
10 TABLET ORAL EVERY 8 HOURS
Refills: 0 | Status: DISCONTINUED | OUTPATIENT
Start: 2020-11-12 | End: 2020-11-13

## 2020-11-12 RX ORDER — FAMOTIDINE 10 MG/ML
20 INJECTION INTRAVENOUS EVERY 12 HOURS
Refills: 0 | Status: DISCONTINUED | OUTPATIENT
Start: 2020-11-12 | End: 2020-11-13

## 2020-11-12 RX ORDER — CEFAZOLIN SODIUM 1 G
2000 VIAL (EA) INJECTION EVERY 8 HOURS
Refills: 0 | Status: COMPLETED | OUTPATIENT
Start: 2020-11-12 | End: 2020-11-13

## 2020-11-12 RX ADMIN — OXYCODONE HYDROCHLORIDE 5 MILLIGRAM(S): 5 TABLET ORAL at 22:03

## 2020-11-12 RX ADMIN — Medication 1000 MILLIGRAM(S): at 22:03

## 2020-11-12 RX ADMIN — Medication 400 MILLIGRAM(S): at 14:47

## 2020-11-12 RX ADMIN — Medication 1000 MILLIGRAM(S): at 15:13

## 2020-11-12 RX ADMIN — Medication 1000 MILLIGRAM(S): at 23:00

## 2020-11-12 RX ADMIN — OXYCODONE HYDROCHLORIDE 5 MILLIGRAM(S): 5 TABLET ORAL at 23:00

## 2020-11-12 RX ADMIN — OXYCODONE HYDROCHLORIDE 5 MILLIGRAM(S): 5 TABLET ORAL at 18:25

## 2020-11-12 RX ADMIN — Medication 50 MILLIGRAM(S): at 22:03

## 2020-11-12 RX ADMIN — Medication 6 MILLIGRAM(S): at 22:04

## 2020-11-12 RX ADMIN — METHOCARBAMOL 500 MILLIGRAM(S): 500 TABLET, FILM COATED ORAL at 22:03

## 2020-11-12 RX ADMIN — APREPITANT 40 MILLIGRAM(S): 80 CAPSULE ORAL at 07:28

## 2020-11-12 RX ADMIN — ONDANSETRON 4 MILLIGRAM(S): 8 TABLET, FILM COATED ORAL at 19:43

## 2020-11-12 RX ADMIN — Medication 100 MILLIGRAM(S): at 19:44

## 2020-11-12 RX ADMIN — Medication 6 MILLIGRAM(S): at 16:06

## 2020-11-12 RX ADMIN — Medication 1000 MILLIGRAM(S): at 07:28

## 2020-11-12 RX ADMIN — GABAPENTIN 300 MILLIGRAM(S): 400 CAPSULE ORAL at 07:28

## 2020-11-12 RX ADMIN — OXYCODONE HYDROCHLORIDE 5 MILLIGRAM(S): 5 TABLET ORAL at 17:25

## 2020-11-12 RX ADMIN — PRIMIDONE 50 MILLIGRAM(S): 250 TABLET ORAL at 19:43

## 2020-11-12 NOTE — PROGRESS NOTE ADULT - SUBJECTIVE AND OBJECTIVE BOX
Orthopaedic Surgery Post Operative Check    Procedure: ALIF/PSF L5-S1  Surgeon:Dr. Langston     Pt comfortable without complaints, pain controlled  Denies CP, SOB, N/V, numbness/tingling    Vital Signs Last 24 Hrs  T(C): 36.4 (11-12-20 @ 14:20), Max: 36.4 (11-12-20 @ 14:20)  T(F): 97.6 (11-12-20 @ 14:20), Max: 97.6 (11-12-20 @ 14:20)  HR: 76 (11-12-20 @ 15:20) (74 - 93)  BP: 147/71 (11-12-20 @ 15:20) (124/76 - 147/71)  BP(mean): 102 (11-12-20 @ 15:20) (94 - 102)  RR: 16 (11-12-20 @ 15:20) (10 - 18)  SpO2: 98% (11-12-20 @ 15:20) (98% - 100%)  AVSS    General: Pt Alert and oriented, NAD  DSG C/D/I abdomen/back prineo dressing, abdominal binder in place  Pulses: DP pulses palpable bilateral lower extremities   Sensation: intact to bilateral lower extremities   Motor: EHL/FHL/TA/GS 5/5 bilateral lower extremities         A/P: 60yMale POD#0 s/p ALIF/PSF L5-S1  - Stable  - Pain Control  - DVT ppx: SCDs  - Post op abx: ancef  - PT, WBS:  WBAT   - f/u AM labs     Ortho Pager [phone]

## 2020-11-12 NOTE — BRIEF OPERATIVE NOTE - NSICDXBRIEFPREOP_GEN_ALL_CORE_FT
PRE-OP DIAGNOSIS:  DDD (degenerative disc disease), lumbar 12-Nov-2020 10:23:44  Aminata Macias  
PRE-OP DIAGNOSIS:  DDD (degenerative disc disease), lumbar 12-Nov-2020 10:23:44  Aminata Macias

## 2020-11-12 NOTE — BRIEF OPERATIVE NOTE - NSICDXBRIEFPOSTOP_GEN_ALL_CORE_FT
POST-OP DIAGNOSIS:  DDD (degenerative disc disease), lumbar 12-Nov-2020 10:23:52  Aminata Macias  
POST-OP DIAGNOSIS:  DDD (degenerative disc disease), lumbar 12-Nov-2020 10:23:52  Aminata Macias

## 2020-11-12 NOTE — BRIEF OPERATIVE NOTE - NSICDXBRIEFPROCEDURE_GEN_ALL_CORE_FT
PROCEDURES:  Open ALIF 12-Nov-2020 10:23:26  Aminata Macias  
PROCEDURES:  Fusion, spine, lumbar, percutaneous, posterior approach 12-Nov-2020 14:41:16 L5/S1 Carolina Martinez  ALIF, 1 level 12-Nov-2020 14:40:51 L5/S1 Carolina Martinez

## 2020-11-12 NOTE — BRIEF OPERATIVE NOTE - OPERATION/FINDINGS
Procedure: ALIF L5-S1  Indication: lower back pain     left lower transverse abdominal incision. Ureter identified and preserved, exposure of the L5-S1 disc space, left iliac vein gently retracted, no injuries noted.   Incision closed with running PDS, running 2-0 and 3-0 vicryl and skin was reapproximated using monocryl.   Hemostasis achieved, no bleeding or hematoma noted.   Palpable pulses at conclusion of case.
see operative report

## 2020-11-12 NOTE — PROGRESS NOTE ADULT - SUBJECTIVE AND OBJECTIVE BOX
POST OP NOTE    ESPINOZA, ORAL  [id number]    Procedure: ALIF L5-S1  Surgeon: Rudy Mcnamara  Assistants:     Patient tolerated procedure well without incident.  Patient transferred to PACU in stable condition.   Pt states that he has had flatus in the PACU.     PE / Post Op Check:       GEN: NAD, AAOx3, resting comfortably with pain controlled      Abd: NT             POST OP NOTE    ORAL ESPINOZA  [id number]    Procedure: ALIF L5-S1  Surgeon: Rudy Mcnamara  Assistants: Colleen    Patient tolerated procedure well without incident.  Patient transferred to PACU in stable condition.   Pt states that he has had flatus in the PACU.     PE / Post Op Check:       GEN: NAD, AAOx3, resting comfortably with pain controlled      Abd: NT

## 2020-11-12 NOTE — PROGRESS NOTE ADULT - ASSESSMENT
A/P: 60yMale POD#0 s/p ALIF/PSF L5-S1    Pt had flatus, can advance diet  Vascular Consult team following  SCD's, can start chemo ppx once ok with primary team  Rest of care per primary team

## 2020-11-13 ENCOUNTER — TRANSCRIPTION ENCOUNTER (OUTPATIENT)
Age: 60
End: 2020-11-13

## 2020-11-13 VITALS
SYSTOLIC BLOOD PRESSURE: 113 MMHG | DIASTOLIC BLOOD PRESSURE: 66 MMHG | RESPIRATION RATE: 15 BRPM | HEART RATE: 69 BPM | OXYGEN SATURATION: 97 % | TEMPERATURE: 99 F

## 2020-11-13 LAB
ANION GAP SERPL CALC-SCNC: 12 MMOL/L — SIGNIFICANT CHANGE UP (ref 5–17)
BASOPHILS # BLD AUTO: 0 K/UL — SIGNIFICANT CHANGE UP (ref 0–0.2)
BASOPHILS NFR BLD AUTO: 0 % — SIGNIFICANT CHANGE UP (ref 0–2)
BUN SERPL-MCNC: 18 MG/DL — SIGNIFICANT CHANGE UP (ref 7–23)
CALCIUM SERPL-MCNC: 9.1 MG/DL — SIGNIFICANT CHANGE UP (ref 8.4–10.5)
CHLORIDE SERPL-SCNC: 101 MMOL/L — SIGNIFICANT CHANGE UP (ref 96–108)
CO2 SERPL-SCNC: 25 MMOL/L — SIGNIFICANT CHANGE UP (ref 22–31)
CREAT SERPL-MCNC: 0.96 MG/DL — SIGNIFICANT CHANGE UP (ref 0.5–1.3)
EOSINOPHIL # BLD AUTO: 0 K/UL — SIGNIFICANT CHANGE UP (ref 0–0.5)
EOSINOPHIL NFR BLD AUTO: 0 % — SIGNIFICANT CHANGE UP (ref 0–6)
GLUCOSE SERPL-MCNC: 161 MG/DL — HIGH (ref 70–99)
HCT VFR BLD CALC: 39.7 % — SIGNIFICANT CHANGE UP (ref 39–50)
HGB BLD-MCNC: 12.9 G/DL — LOW (ref 13–17)
IMM GRANULOCYTES NFR BLD AUTO: 0.3 % — SIGNIFICANT CHANGE UP (ref 0–1.5)
LYMPHOCYTES # BLD AUTO: 0.73 K/UL — LOW (ref 1–3.3)
LYMPHOCYTES # BLD AUTO: 8.1 % — LOW (ref 13–44)
MCHC RBC-ENTMCNC: 32.5 GM/DL — SIGNIFICANT CHANGE UP (ref 32–36)
MCHC RBC-ENTMCNC: 32.5 PG — SIGNIFICANT CHANGE UP (ref 27–34)
MCV RBC AUTO: 100 FL — SIGNIFICANT CHANGE UP (ref 80–100)
MONOCYTES # BLD AUTO: 0.47 K/UL — SIGNIFICANT CHANGE UP (ref 0–0.9)
MONOCYTES NFR BLD AUTO: 5.2 % — SIGNIFICANT CHANGE UP (ref 2–14)
NEUTROPHILS # BLD AUTO: 7.78 K/UL — HIGH (ref 1.8–7.4)
NEUTROPHILS NFR BLD AUTO: 86.4 % — HIGH (ref 43–77)
NRBC # BLD: 0 /100 WBCS — SIGNIFICANT CHANGE UP (ref 0–0)
PLATELET # BLD AUTO: 209 K/UL — SIGNIFICANT CHANGE UP (ref 150–400)
POTASSIUM SERPL-MCNC: 5.1 MMOL/L — SIGNIFICANT CHANGE UP (ref 3.5–5.3)
POTASSIUM SERPL-SCNC: 5.1 MMOL/L — SIGNIFICANT CHANGE UP (ref 3.5–5.3)
RBC # BLD: 3.97 M/UL — LOW (ref 4.2–5.8)
RBC # FLD: 12.7 % — SIGNIFICANT CHANGE UP (ref 10.3–14.5)
SODIUM SERPL-SCNC: 138 MMOL/L — SIGNIFICANT CHANGE UP (ref 135–145)
WBC # BLD: 9.01 K/UL — SIGNIFICANT CHANGE UP (ref 3.8–10.5)
WBC # FLD AUTO: 9.01 K/UL — SIGNIFICANT CHANGE UP (ref 3.8–10.5)

## 2020-11-13 PROCEDURE — 36415 COLL VENOUS BLD VENIPUNCTURE: CPT

## 2020-11-13 PROCEDURE — 80048 BASIC METABOLIC PNL TOTAL CA: CPT

## 2020-11-13 PROCEDURE — 86901 BLOOD TYPING SEROLOGIC RH(D): CPT

## 2020-11-13 PROCEDURE — 86850 RBC ANTIBODY SCREEN: CPT

## 2020-11-13 PROCEDURE — 76000 FLUOROSCOPY <1 HR PHYS/QHP: CPT

## 2020-11-13 PROCEDURE — 86900 BLOOD TYPING SEROLOGIC ABO: CPT

## 2020-11-13 PROCEDURE — 95940 IONM IN OPERATNG ROOM 15 MIN: CPT

## 2020-11-13 PROCEDURE — C1889: CPT

## 2020-11-13 PROCEDURE — C1713: CPT

## 2020-11-13 PROCEDURE — C1769: CPT

## 2020-11-13 PROCEDURE — 85025 COMPLETE CBC W/AUTO DIFF WBC: CPT

## 2020-11-13 RX ORDER — POLYETHYLENE GLYCOL 3350 17 G/17G
17 POWDER, FOR SOLUTION ORAL
Qty: 0 | Refills: 0 | DISCHARGE
Start: 2020-11-13

## 2020-11-13 RX ORDER — SENNA PLUS 8.6 MG/1
2 TABLET ORAL
Qty: 0 | Refills: 0 | DISCHARGE
Start: 2020-11-13

## 2020-11-13 RX ORDER — METHOCARBAMOL 500 MG/1
1 TABLET, FILM COATED ORAL
Qty: 21 | Refills: 0
Start: 2020-11-13 | End: 2020-11-19

## 2020-11-13 RX ORDER — ACETAMINOPHEN 500 MG
1 TABLET ORAL
Qty: 21 | Refills: 0
Start: 2020-11-13 | End: 2020-11-19

## 2020-11-13 RX ADMIN — Medication 6 MILLIGRAM(S): at 10:45

## 2020-11-13 RX ADMIN — ONDANSETRON 4 MILLIGRAM(S): 8 TABLET, FILM COATED ORAL at 05:28

## 2020-11-13 RX ADMIN — Medication 1000 MILLIGRAM(S): at 05:27

## 2020-11-13 RX ADMIN — Medication 50 MILLIGRAM(S): at 05:27

## 2020-11-13 RX ADMIN — Medication 100 MILLIGRAM(S): at 03:39

## 2020-11-13 RX ADMIN — METHOCARBAMOL 500 MILLIGRAM(S): 500 TABLET, FILM COATED ORAL at 05:28

## 2020-11-13 RX ADMIN — Medication 6 MILLIGRAM(S): at 03:41

## 2020-11-13 RX ADMIN — Medication 1000 MILLIGRAM(S): at 06:27

## 2020-11-13 RX ADMIN — OXYCODONE HYDROCHLORIDE 5 MILLIGRAM(S): 5 TABLET ORAL at 03:40

## 2020-11-13 RX ADMIN — OXYCODONE HYDROCHLORIDE 5 MILLIGRAM(S): 5 TABLET ORAL at 04:40

## 2020-11-13 RX ADMIN — PRIMIDONE 50 MILLIGRAM(S): 250 TABLET ORAL at 05:27

## 2020-11-13 NOTE — DISCHARGE NOTE PROVIDER - NSDCFUSCHEDAPPT_GEN_ALL_CORE_FT
ESPINOZA, ORAL ; 11/18/2020 ; [facility] [address]  ESPINOZA, ORAL ; 12/02/2020 ; [facility] [address]  ESPINOZA, ORAL ; 12/03/2020 ; [facility] [address]

## 2020-11-13 NOTE — DISCHARGE NOTE PROVIDER - NSDCCPTREATMENT_GEN_ALL_CORE_FT
PRINCIPAL PROCEDURE  Procedure: ALIF, 1 level  Findings and Treatment: L5/S1      SECONDARY PROCEDURE  Procedure: Fusion, spine, lumbar, percutaneous, posterior approach  Findings and Treatment: L5/S1

## 2020-11-13 NOTE — DISCHARGE NOTE PROVIDER - NSDCMRMEDTOKEN_GEN_ALL_CORE_FT
Lyrica 50 mg oral capsule: 1 cap(s) orally every 8 hours MDD:3  methocarbamol 500 mg oral tablet: 1 tab(s) orally every 8 hours as needed for muscle spasms  oxycodone-acetaminophen 5 mg-325 mg oral tablet: 1 tab(s) orally every 6 hours as needed for severe pain MDD:4  polyethylene glycol 3350 oral powder for reconstitution: 17 gram(s) orally once a day  primidone: 50 milligram(s) orally 2 times a day  1 tab in am  2 tabs in PM  senna oral tablet: 2 tab(s) orally once a day (at bedtime)  Tylenol Extra Strength 500 mg oral tablet: 1 tab(s) orally every 8 hours

## 2020-11-13 NOTE — DISCHARGE NOTE PROVIDER - NSDCCPCAREPLAN_GEN_ALL_CORE_FT
PRINCIPAL DISCHARGE DIAGNOSIS  Diagnosis: Low back pain  Assessment and Plan of Treatment: Low back pain

## 2020-11-13 NOTE — DISCHARGE NOTE PROVIDER - NSDCFUADDINST_GEN_ALL_CORE_FT
You may take showers. Your dressing is water resistant. Let soapy water fall over incision and pat dry.   No soaking in bathtubs.  Leave incision open to air. Keep incision clean and dry. Do not remove the dressing/tape overlying your incision.     No strenuous activity (bending/twisting), heavy lifting, driving or returning to work until cleared by MD.  Try to have regular bowel movements, take stool softener or laxative if necessary.  May apply ice to affected areas to decrease swelling.  Call to schedule an appt with Dr. Langston for follow up, if you have staples or sutures they will be removed in office.  Contact your doctor if you experience: fever greater than 101.5, chills, chest pain, difficulty breathing, redness or excessive drainage around the incision, other concerns.  Follow up with your primary care provider.

## 2020-11-13 NOTE — DISCHARGE NOTE PROVIDER - HOSPITAL COURSE
Admitted to Orthopedic service Dr. Langston   Surgery  on 11/12/20  Sharla-op Antibiotics  Pain control  DVT prophylaxis  OOB/Physical Therapy

## 2020-11-13 NOTE — DISCHARGE NOTE NURSING/CASE MANAGEMENT/SOCIAL WORK - PATIENT PORTAL LINK FT
You can access the FollowMyHealth Patient Portal offered by [facility] by registering at the following website: [email or web address]. By joining [facility]’s FollowMyHealth portal, you will also be able to view your health information using other applications (apps) compatible with our system.

## 2020-11-13 NOTE — PROGRESS NOTE ADULT - ASSESSMENT
A/P: 60y Male s/p ALIF/PSF L5-S1      Vascular Consult team following  SCD's, can start chemo ppx once ok with primary team  Rest of care per primary team

## 2020-11-13 NOTE — DISCHARGE NOTE PROVIDER - CARE PROVIDER_API CALL
Dannie Langston  ORTHOPEDICS  [address] [address]  Phone: [phone]  Fax: [phone]  Follow Up Time:

## 2020-11-13 NOTE — PROGRESS NOTE ADULT - SUBJECTIVE AND OBJECTIVE BOX
SUBJECTIVE: Patient seen and examined. Pain controlled. Tolerating CLD, passing gas, will trial regular diet. No issues overnight. No HA, f/c/n/v/cp/sob.     OBJECTIVE:  NAD  Vital Signs Last 24 Hrs  T(C): 36.8 (13 Nov 2020 05:18), Max: 36.8 (13 Nov 2020 05:18)  T(F): 98.3 (13 Nov 2020 05:18), Max: 98.3 (13 Nov 2020 05:18)  HR: 75 (13 Nov 2020 05:18) (61 - 94)  BP: 100/55 (13 Nov 2020 05:18) (100/55 - 147/71)  BP(mean): 90 (12 Nov 2020 16:35) (89 - 102)  RR: 18 (13 Nov 2020 05:18) (10 - 18)  SpO2: 96% (13 Nov 2020 05:18) (96% - 100%)    Physical Exam:   General: Resting comfortably, NAD  Abdomen: Prineo intact LLQ. Abdomen soft, nontender, nondistended  Back: Prineo x 2 intact.  RLE: LLE : No gross deformity. SILT. TA/EHL/FHL/GS motor intact 5/5. Warm well perfused; capillary refill <3 seconds   LLE : No gross deformity. SILT. TA/EHL/FHL/GS motor intact 5/5. Warm well perfused; capillary refill <3 seconds          Labs:                   12.9   9.01  )-----------( 209      ( 13 Nov 2020 06:07 )             39.7         A/P :  Pt is a 61yo Male s/p ALIF/PSF L5-S1    -  Pain control  -  DVT ppx: SCDs     -  Weight bearing status: WBAT   -  No drains  -  Physical Therapy  -  Dispo: Home if clears PT today

## 2020-11-13 NOTE — PROGRESS NOTE ADULT - SUBJECTIVE AND OBJECTIVE BOX
Patient is a 60y old  Male who presents with a chief complaint of Back pain (13 Nov 2020 06:29)      INTERVAL HPI/ OVERNIGHT EVENTS: No overnight events. Pt eating breakfast.  He is passing gas but has not had a bowel movement yet.      LABS                        12.9   9.01  )-----------( 209      ( 13 Nov 2020 06:07 )             39.7     11-13    138  |  101  |  18  ----------------------------<  161<H>  5.1   |  25  |  0.96    Ca    9.1      13 Nov 2020 06:07          ICU Vital Signs Last 24 Hrs  T(C): 37.2 (13 Nov 2020 08:32), Max: 37.2 (13 Nov 2020 08:32)  T(F): 98.9 (13 Nov 2020 08:32), Max: 98.9 (13 Nov 2020 08:32)  HR: 69 (13 Nov 2020 08:32) (61 - 94)  BP: 113/66 (13 Nov 2020 08:32) (100/55 - 147/71)  BP(mean): 90 (12 Nov 2020 16:35) (89 - 102)  ABP: 14/- (13 Nov 2020 08:32) (14/- - 150/67)  ABP(mean): 97 (12 Nov 2020 15:20) (85 - 97)  RR: 15 (13 Nov 2020 08:32) (10 - 18)  SpO2: 97% (13 Nov 2020 08:32) (96% - 100%)      RADIOLOGY    MICROBIOLOGY    PHYSICAL EXAM  GEN: NAD, AAOx3      Abd: NTND      Extremities WWP      Cardio: Non-tachycardic

## 2020-11-13 NOTE — PHYSICAL THERAPY INITIAL EVALUATION ADULT - ADDITIONAL COMMENTS
Pt lives with his partner in a house with 6 OMAR and 1 flight of stairs to reach the bedroom. Pt states he can sleep downstairs if needed. Prior to hospitalization, ambulated independently with no DME and states he was very active.

## 2020-11-19 ENCOUNTER — NON-APPOINTMENT (OUTPATIENT)
Age: 60
End: 2020-11-19

## 2020-11-25 ENCOUNTER — APPOINTMENT (OUTPATIENT)
Dept: ORTHOPEDIC SURGERY | Facility: CLINIC | Age: 60
End: 2020-11-25
Payer: MEDICAID

## 2020-11-25 PROCEDURE — 99024 POSTOP FOLLOW-UP VISIT: CPT

## 2020-11-25 PROCEDURE — 72170 X-RAY EXAM OF PELVIS: CPT

## 2020-11-25 NOTE — DISCUSSION/SUMMARY
[de-identified] : 59y/o male 4mo s/p R EDUAR, doing well\par - Cont HEP\par - RTC next July for anniversary check with new XRs\par - [employer] study participant: XRs needed are low AP pelvis and frog lateral of the right hip

## 2020-11-25 NOTE — HISTORY OF PRESENT ILLNESS
[de-identified] : 59y/o male following up s/p R EDUAR 7/31/20. Underwent L5/S1 ALIF/PSF by Wilma Langston and Naima earlier this month; reports everything went well and is very happy with the outcome thus far. The hip is doing well. He can forget about it most days. He does HEP consisting of ambulation and some yoga. No complaints today.

## 2020-11-25 NOTE — PHYSICAL EXAM
[de-identified] : General appearance: well nourished and hydrated, pleasant, alert and oriented x 3, cooperative.  \par HEENT: normocephalic, EOM intact, wearing mask, external auditory canal clear.  \par Cardiovascular: no lower leg edema, no varicosities, dorsalis pedis pulses palpable and symmetric.  \par Lymphatics: no palpable lymphadenopathy, no lymphedema.  \par Neurologic: sensation is normal, no muscle weakness in upper or lower extremities, patella tendon reflexes present and symmetric.  \par Dermatologic: skin moist, warm, no rash.  \par Spine: cervical spine with normal lordosis and painless range of motion, thoracic spine with normal kyphosis and painless range of motion, lumbosacral spine with normal lordosis; range of motion testing deferred.  Well approximated posterior paraspinal incisions and anterior low abdominal incision noted.\par Gait: normal.  \par \par Limb lengths clinically equal; no contractures or deformity noted at hips, knees, or ankles\par \par Left hip:\par - Skin intact, no scars or other prior surgical incisions noted\par - No swelling/ecchymosis\par - No specific tenderness on palpation\par - ROM: 130 flexion, 0 extension, 20 adduction, 50 abduction, 20 internal rotation, 75 external rotation\par - ANAM painless\par - FADIR painless\par - [place] negative\par - Stinchfield negative\par - Flexor power 5/5\par - Abductor power 5/5\par \par Right hip:\par - Skin intact, well healed anterior incision noted\par - No swelling/ecchymosis\par - No specific tenderness on palpation\par - ROM: 130 flexion, 10 extension, 20 adduction, 50 abduction, 25 internal rotation, 75 external rotation\par - ANAM painless\par - FADIR painless\par - [place] negative\par - Stinchfield negative\par - Flexor power 5/5\par - Abductor power 5/5 [de-identified] : AP and low AP pelvis, false profile and frog laterals of the right hip were taken today and interpreted by me. \par \par Right total hip arthroplasty in unchanged position from prior study without evidence of fracture, osteolysis, or loosening. Interval performance of L5/S1 fusion with interbody cage and bilateral home/screw construct.

## 2020-12-02 ENCOUNTER — OUTPATIENT (OUTPATIENT)
Dept: OUTPATIENT SERVICES | Facility: HOSPITAL | Age: 60
LOS: 1 days | End: 2020-12-02
Payer: COMMERCIAL

## 2020-12-02 ENCOUNTER — APPOINTMENT (OUTPATIENT)
Dept: ORTHOPEDIC SURGERY | Facility: CLINIC | Age: 60
End: 2020-12-02
Payer: MEDICAID

## 2020-12-02 ENCOUNTER — RESULT REVIEW (OUTPATIENT)
Age: 60
End: 2020-12-02

## 2020-12-02 VITALS — TEMPERATURE: 97.6 F

## 2020-12-02 DIAGNOSIS — Z98.890 OTHER SPECIFIED POSTPROCEDURAL STATES: Chronic | ICD-10-CM

## 2020-12-02 DIAGNOSIS — Z96.641 PRESENCE OF RIGHT ARTIFICIAL HIP JOINT: Chronic | ICD-10-CM

## 2020-12-02 PROBLEM — M87.9 OSTEONECROSIS, UNSPECIFIED: Chronic | Status: ACTIVE | Noted: 2020-11-11

## 2020-12-02 PROBLEM — M54.5 LOW BACK PAIN: Chronic | Status: ACTIVE | Noted: 2020-11-11

## 2020-12-02 PROBLEM — G25.0 ESSENTIAL TREMOR: Chronic | Status: ACTIVE | Noted: 2020-07-30

## 2020-12-02 PROCEDURE — 99024 POSTOP FOLLOW-UP VISIT: CPT

## 2020-12-02 PROCEDURE — 72100 X-RAY EXAM L-S SPINE 2/3 VWS: CPT | Mod: 26

## 2020-12-02 PROCEDURE — 72100 X-RAY EXAM L-S SPINE 2/3 VWS: CPT

## 2020-12-03 ENCOUNTER — APPOINTMENT (OUTPATIENT)
Dept: NEUROLOGY | Facility: CLINIC | Age: 60
End: 2020-12-03
Payer: MEDICAID

## 2020-12-03 PROCEDURE — 99213 OFFICE O/P EST LOW 20 MIN: CPT | Mod: 95

## 2020-12-03 NOTE — HISTORY OF PRESENT ILLNESS
[FreeTextEntry1] : Braxton is a 60 yo man with a history of tremor.\par \par Primidone was uptitrated, now at 50/100, and no issues currently.\par Handwriting is very smooth, improved from the 100mg/d.\par There was about 80% improvement at 50mg bid.  Handwriting was still challenging then.\par \par Effexor in the AM now off.\par Primidone has been partly calming as well.  He sleeps through the night.  He wakes up feeling great, and the morning dose does not make him too sleepy.\par \par \par Background:\par He notes feels a vibration in his arms, ever since grade 1.  It worsened through adolescence, and has stayed the same severity.\par Sometimes it is worse, brought out by anxiety.  Public speaking was a problem, but better now.\par He has noticed that alcohol can improve the symptoms.  He used to be , but no longer has alcohol.\par Feels that his arms are hollow.\par Handwriting can be tricky at times. fluctuates.  But fluctuations are no worse than 10 years ago, for instance.\par \par He does not recall seeing it in his family, perhaps with his mother, but not sure.  Father was 'an alcoholic'.  His brother is autistic and not in touch.\par \par No meds now.\par Allergies: NKDA, but mild hayfever/pollen problems.\par \par Social: teacher now,

## 2020-12-03 NOTE — DISCUSSION/SUMMARY
[FreeTextEntry1] : Impression:\par 1) essential tremor with excellent response to primidone, now at 50/100.\par \par Plan:\par 1) continue primidone 50/100, f/u in about 6 months.

## 2020-12-03 NOTE — PHYSICAL EXAM
[FreeTextEntry1] : \par \par General:\par Constitutional:  Sitting comfortably in NAD.\par Psychiatric: well-groomed, appropriate affect, insight/judgment intact\par \par Cognitive:\par Orientation, language, memory and knowledge screens intact.\par No expressive or receptive errors.\par \par Cranial Nerves:\par VII: Face appears symmetric \par \par Tremor:  minimal in the hands.\par

## 2020-12-17 NOTE — HISTORY OF PRESENT ILLNESS
[de-identified] : 3 weeks post op [de-identified] : s/p L5/S1 ALIF with percutaneous posterior instrumentation, doing well. Lower extremity pain resolved from preop. Low back pain improving. Feels posture improved from preop. No fever/chills/drainage from incision. Off opioid pain medications.  [de-identified] : Well healing incisions, no erythema, drainage, or warmth\par 5/5 strength L2-S1 b/l\par SILT L2-S1 b/l\par wwp\par neg homans b/l [de-identified] : XR 12/2/20: hardware intact, good alignment, unchanged from intraop [de-identified] : s/p L5/S1 ALIF with percutaneous posterior instrumentation, doing well. Lower extremity pain resolved from preop. Low back pain improving. No sign of infection. [de-identified] : Dressing and suture tails removed\par Wound and activity instructions given\par Follow up in 3-4 weeks, sooner if there is an issue\par XR AP/lateral lumbar at that time\par All questions answered

## 2020-12-29 ENCOUNTER — RESULT REVIEW (OUTPATIENT)
Age: 60
End: 2020-12-29

## 2020-12-29 ENCOUNTER — APPOINTMENT (OUTPATIENT)
Dept: ORTHOPEDIC SURGERY | Facility: CLINIC | Age: 60
End: 2020-12-29
Payer: MEDICAID

## 2020-12-29 ENCOUNTER — OUTPATIENT (OUTPATIENT)
Dept: OUTPATIENT SERVICES | Facility: HOSPITAL | Age: 60
LOS: 1 days | End: 2020-12-29
Payer: COMMERCIAL

## 2020-12-29 VITALS — TEMPERATURE: 94.7 F

## 2020-12-29 DIAGNOSIS — B34.9 VIRAL INFECTION, UNSPECIFIED: ICD-10-CM

## 2020-12-29 DIAGNOSIS — Z98.890 OTHER SPECIFIED POSTPROCEDURAL STATES: Chronic | ICD-10-CM

## 2020-12-29 DIAGNOSIS — Z96.641 PRESENCE OF RIGHT ARTIFICIAL HIP JOINT: Chronic | ICD-10-CM

## 2020-12-29 PROCEDURE — 99024 POSTOP FOLLOW-UP VISIT: CPT

## 2020-12-29 PROCEDURE — 72100 X-RAY EXAM L-S SPINE 2/3 VWS: CPT | Mod: 26

## 2020-12-29 PROCEDURE — 72100 X-RAY EXAM L-S SPINE 2/3 VWS: CPT

## 2020-12-29 NOTE — HISTORY OF PRESENT ILLNESS
[de-identified] : 6.5 weeks post op [de-identified] : s/p L5/S1 ALIF with percutaneous posterior instrumentation, doing well. Lower extremity pain resolved from preop. Low back pain improving. Feels posture improved from preop. No fever/chills/drainage from incision. Off opioid pain medications. Had viral syndrome about 2 weeks ago, had headache, myalgias. COVID negative on rapid test x 2, COVID negative on PCR x 1. Symptoms have improved [de-identified] : Well healing incisions, no erythema, drainage, or warmth\par 5/5 strength L2-S1 b/l\par SILT L2-S1 b/l\par wwp\par neg homans b/l [de-identified] : XR 12/29/20: hardware intact, good alignment, unchanged from intraop\par \par XR 12/2/20: hardware intact, good alignment, unchanged from intraop [de-identified] : s/p L5/S1 ALIF with percutaneous posterior instrumentation, doing well. Lower extremity pain resolved from preop. Low back pain improving. No sign of infection. [de-identified] : Wound and activity instructions given\par Will order baseline blood work, will complete today\par Will call patient with results of blood work \par Pending results of blood work, follow up in 6 weeks, sooner if there is an issue\par XR AP/lateral lumbar at that time\par All questions answered

## 2020-12-30 LAB
BASOPHILS # BLD AUTO: 0.06 K/UL
BASOPHILS NFR BLD AUTO: 1.1 %
CRP SERPL-MCNC: 0.11 MG/DL
EOSINOPHIL # BLD AUTO: 0.13 K/UL
EOSINOPHIL NFR BLD AUTO: 2.4 %
ERYTHROCYTE [SEDIMENTATION RATE] IN BLOOD BY WESTERGREN METHOD: 2 MM/HR
HCT VFR BLD CALC: 43.8 %
HGB BLD-MCNC: 13.7 G/DL
IMM GRANULOCYTES NFR BLD AUTO: 0.2 %
LYMPHOCYTES # BLD AUTO: 1.62 K/UL
LYMPHOCYTES NFR BLD AUTO: 30.5 %
MAN DIFF?: NORMAL
MCHC RBC-ENTMCNC: 31.3 GM/DL
MCHC RBC-ENTMCNC: 32.1 PG
MCV RBC AUTO: 102.6 FL
MONOCYTES # BLD AUTO: 0.74 K/UL
MONOCYTES NFR BLD AUTO: 13.9 %
NEUTROPHILS # BLD AUTO: 2.76 K/UL
NEUTROPHILS NFR BLD AUTO: 51.9 %
PLATELET # BLD AUTO: 289 K/UL
RBC # BLD: 4.27 M/UL
RBC # FLD: 13 %
WBC # FLD AUTO: 5.32 K/UL

## 2021-01-07 ENCOUNTER — APPOINTMENT (OUTPATIENT)
Dept: UROLOGY | Facility: CLINIC | Age: 61
End: 2021-01-07
Payer: MEDICAID

## 2021-01-07 VITALS — HEART RATE: 73 BPM | TEMPERATURE: 98.3 F | SYSTOLIC BLOOD PRESSURE: 140 MMHG | DIASTOLIC BLOOD PRESSURE: 77 MMHG

## 2021-01-07 PROCEDURE — 99072 ADDL SUPL MATRL&STAF TM PHE: CPT

## 2021-01-07 PROCEDURE — 99213 OFFICE O/P EST LOW 20 MIN: CPT | Mod: 25

## 2021-01-07 NOTE — PHYSICAL EXAM
[General Appearance - Well Developed] : well developed [General Appearance - Well Nourished] : well nourished [Normal Appearance] : normal appearance [Well Groomed] : well groomed [General Appearance - In No Acute Distress] : no acute distress [Abdomen Soft] : soft [Urethral Meatus] : meatus normal [Testes Mass (___cm)] : there were no testicular masses [Prostate Enlargement] : the prostate was not enlarged [No Prostate Nodules] : no prostate nodules [Skin Color & Pigmentation] : normal skin color and pigmentation [Heart Rate And Rhythm] : Heart rate and rhythm were normal [] : no respiratory distress [Oriented To Time, Place, And Person] : oriented to person, place, and time [Affect] : the affect was normal [Mood] : the mood was normal [Not Anxious] : not anxious [Normal Station and Gait] : the gait and station were normal for the patient's age [No Focal Deficits] : no focal deficits

## 2021-01-07 NOTE — HISTORY OF PRESENT ILLNESS
[FreeTextEntry1] : f/u for BPH, was on short trial of Flomax 0.4 for 2 month only\par doing well, urinary symptoms stable\par new issue ED, and wants to discuss trialing PDE 5 inhibitors\par difficulty with maintaining erections, partial erections\par able to penetrate.\par no curvature, no pain with ejaculations\par

## 2021-01-07 NOTE — ASSESSMENT
[FreeTextEntry1] : ED trial Viagra 100 mg PO PRN\par f/u in 8 -12 weeks if not better erections\par PSA next time for 2021

## 2021-01-25 ENCOUNTER — APPOINTMENT (OUTPATIENT)
Dept: DERMATOLOGY | Facility: CLINIC | Age: 61
End: 2021-01-25
Payer: MEDICAID

## 2021-01-25 DIAGNOSIS — L57.0 ACTINIC KERATOSIS: ICD-10-CM

## 2021-01-25 PROCEDURE — 99214 OFFICE O/P EST MOD 30 MIN: CPT | Mod: 25

## 2021-01-25 PROCEDURE — 17003 DESTRUCT PREMALG LES 2-14: CPT | Mod: 59

## 2021-01-25 PROCEDURE — 17000 DESTRUCT PREMALG LESION: CPT

## 2021-01-25 PROCEDURE — 99072 ADDL SUPL MATRL&STAF TM PHE: CPT

## 2021-01-26 ENCOUNTER — RESULT REVIEW (OUTPATIENT)
Age: 61
End: 2021-01-26

## 2021-01-26 ENCOUNTER — OUTPATIENT (OUTPATIENT)
Dept: OUTPATIENT SERVICES | Facility: HOSPITAL | Age: 61
LOS: 1 days | End: 2021-01-26
Payer: SUBSIDIZED

## 2021-01-26 ENCOUNTER — APPOINTMENT (OUTPATIENT)
Dept: ORTHOPEDIC SURGERY | Facility: CLINIC | Age: 61
End: 2021-01-26
Payer: SUBSIDIZED

## 2021-01-26 ENCOUNTER — APPOINTMENT (OUTPATIENT)
Dept: ORTHOPEDIC SURGERY | Facility: CLINIC | Age: 61
End: 2021-01-26
Payer: MEDICAID

## 2021-01-26 ENCOUNTER — OUTPATIENT (OUTPATIENT)
Dept: OUTPATIENT SERVICES | Facility: HOSPITAL | Age: 61
LOS: 1 days | End: 2021-01-26
Payer: COMMERCIAL

## 2021-01-26 VITALS — TEMPERATURE: 96.9 F

## 2021-01-26 DIAGNOSIS — Z96.641 PRESENCE OF RIGHT ARTIFICIAL HIP JOINT: Chronic | ICD-10-CM

## 2021-01-26 DIAGNOSIS — Z98.890 OTHER SPECIFIED POSTPROCEDURAL STATES: Chronic | ICD-10-CM

## 2021-01-26 PROCEDURE — 99024 POSTOP FOLLOW-UP VISIT: CPT

## 2021-01-26 PROCEDURE — 72100 X-RAY EXAM L-S SPINE 2/3 VWS: CPT

## 2021-01-26 PROCEDURE — ZZZZZ: CPT

## 2021-01-26 PROCEDURE — 73501 X-RAY EXAM HIP UNI 1 VIEW: CPT

## 2021-01-26 PROCEDURE — 72100 X-RAY EXAM L-S SPINE 2/3 VWS: CPT | Mod: 26

## 2021-01-26 PROCEDURE — 73501 X-RAY EXAM HIP UNI 1 VIEW: CPT | Mod: 26,RT

## 2021-01-26 RX ORDER — DEXAMETHASONE 4 MG/1
4 TABLET ORAL
Qty: 14 | Refills: 0 | Status: DISCONTINUED | COMMUNITY
Start: 2020-11-23 | End: 2021-01-26

## 2021-02-09 NOTE — HISTORY OF PRESENT ILLNESS
[de-identified] : 2.5 months post op [de-identified] : s/p L5/S1 ALIF with percutaneous posterior instrumentation, doing well. Reports significant relief in lower extremity pain from preop. Gets occasional bilateral lateral lower extremity pain. Low back pain significantly improved, occasional left sided low back pain near incision. [de-identified] : Well healed incisions, no erythema, drainage, or warmth\par 5/5 strength L2-S1 b/l\par SILT L2-S1 b/l\par wwp\par neg homans b/l [de-identified] : XR 1/26/21 (my read): hardware intact, good alignment, unchanged from intraop\par \par XR 12/29/20: hardware intact, good alignment, unchanged from intraop\par \par XR 12/2/20: hardware intact, good alignment, unchanged from intraop [de-identified] : s/p L5/S1 ALIF with percutaneous posterior instrumentation, doing well. Lower extremity and low back pain significantly improved from preop. No sign of infection. [de-identified] : Wound and activity instructions given\par Order for PT given\par Follow up in 3 months, sooner if there is an issue\par XR lumbar 4 view at that time\par All questions answered

## 2021-02-25 ENCOUNTER — LABORATORY RESULT (OUTPATIENT)
Age: 61
End: 2021-02-25

## 2021-02-25 ENCOUNTER — APPOINTMENT (OUTPATIENT)
Dept: DERMATOLOGY | Facility: CLINIC | Age: 61
End: 2021-02-25
Payer: MEDICAID

## 2021-02-25 VITALS — DIASTOLIC BLOOD PRESSURE: 75 MMHG | SYSTOLIC BLOOD PRESSURE: 127 MMHG

## 2021-02-25 DIAGNOSIS — D40.8 NEOPLASM OF UNCERTAIN BEHAVIOR OF OTHER SPECIFIED MALE GENITAL ORGANS: ICD-10-CM

## 2021-02-25 PROCEDURE — 11401 EXC TR-EXT B9+MARG 0.6-1 CM: CPT

## 2021-02-25 PROCEDURE — 11420 EXC H-F-NK-SP B9+MARG 0.5/<: CPT

## 2021-02-25 PROCEDURE — 12041 INTMD RPR N-HF/GENIT 2.5CM/<: CPT | Mod: 59

## 2021-02-25 PROCEDURE — 99072 ADDL SUPL MATRL&STAF TM PHE: CPT

## 2021-02-25 PROCEDURE — 12032 INTMD RPR S/A/T/EXT 2.6-7.5: CPT | Mod: 59

## 2021-03-04 ENCOUNTER — APPOINTMENT (OUTPATIENT)
Dept: UROLOGY | Facility: CLINIC | Age: 61
End: 2021-03-04
Payer: MEDICAID

## 2021-03-04 VITALS — TEMPERATURE: 97.3 F | HEART RATE: 71 BPM | DIASTOLIC BLOOD PRESSURE: 72 MMHG | SYSTOLIC BLOOD PRESSURE: 133 MMHG

## 2021-03-04 DIAGNOSIS — R35.1 BENIGN PROSTATIC HYPERPLASIA WITH LOWER URINARY TRACT SYMPMS: ICD-10-CM

## 2021-03-04 DIAGNOSIS — N40.1 BENIGN PROSTATIC HYPERPLASIA WITH LOWER URINARY TRACT SYMPMS: ICD-10-CM

## 2021-03-04 PROCEDURE — 99072 ADDL SUPL MATRL&STAF TM PHE: CPT

## 2021-03-04 PROCEDURE — 99212 OFFICE O/P EST SF 10 MIN: CPT | Mod: 25

## 2021-03-04 RX ORDER — TAMSULOSIN HYDROCHLORIDE 0.4 MG/1
0.4 CAPSULE ORAL
Qty: 180 | Refills: 3 | Status: DISCONTINUED | COMMUNITY
Start: 2020-03-10 | End: 2021-03-04

## 2021-03-04 NOTE — PHYSICAL EXAM
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

## 2021-03-04 NOTE — ASSESSMENT
[FreeTextEntry1] : ED, effective on PDE5i\par some side effects\par Try Cialis 20 mg PO PRN, E-Rx\par f/u in one year\par \par BPH stable, not on meds\par f/u in 6 months

## 2021-03-04 NOTE — HISTORY OF PRESENT ILLNESS
[FreeTextEntry1] : ED, discuss post trialing Sildenafil 100 mg \par much improved on maintaining erections\par able to penetrate, uses regularly effective\par but reports some S/E with face flushing /redness\par interested in switching to Cialis ?\par \par BPH, past short time Flomax 0.4 for 2 month only\par not on now, stable symptoms

## 2021-03-08 ENCOUNTER — APPOINTMENT (OUTPATIENT)
Dept: DERMATOLOGY | Facility: CLINIC | Age: 61
End: 2021-03-08
Payer: MEDICAID

## 2021-03-08 ENCOUNTER — FORM ENCOUNTER (OUTPATIENT)
Age: 61
End: 2021-03-08

## 2021-03-08 PROCEDURE — 99072 ADDL SUPL MATRL&STAF TM PHE: CPT

## 2021-03-08 PROCEDURE — 99212 OFFICE O/P EST SF 10 MIN: CPT

## 2021-03-08 NOTE — HISTORY OF PRESENT ILLNESS
[FreeTextEntry1] : suture removal  [de-identified] : 60M who presents for suture removal. States that scrotal sutures dissolved. No issues with back.

## 2021-03-08 NOTE — PHYSICAL EXAM
[Alert] : alert [Oriented x 3] : ~L oriented x 3 [Well Nourished] : well nourished [Conjunctiva Non-injected] : conjunctiva non-injected [No Visual Lymphadenopathy] : no visual  lymphadenopathy [No Clubbing] : no clubbing [No Edema] : no edema [No Bromhidrosis] : no bromhidrosis [No Chromhidrosis] : no chromhidrosis [FreeTextEntry3] : WHI with some overlying crust back

## 2021-04-13 ENCOUNTER — APPOINTMENT (OUTPATIENT)
Dept: ORTHOPEDIC SURGERY | Facility: CLINIC | Age: 61
End: 2021-04-13
Payer: MEDICAID

## 2021-04-13 ENCOUNTER — RESULT REVIEW (OUTPATIENT)
Age: 61
End: 2021-04-13

## 2021-04-13 ENCOUNTER — OUTPATIENT (OUTPATIENT)
Dept: OUTPATIENT SERVICES | Facility: HOSPITAL | Age: 61
LOS: 1 days | End: 2021-04-13
Payer: COMMERCIAL

## 2021-04-13 VITALS — TEMPERATURE: 97.5 F

## 2021-04-13 DIAGNOSIS — Z96.641 PRESENCE OF RIGHT ARTIFICIAL HIP JOINT: Chronic | ICD-10-CM

## 2021-04-13 DIAGNOSIS — Z98.890 OTHER SPECIFIED POSTPROCEDURAL STATES: Chronic | ICD-10-CM

## 2021-04-13 PROCEDURE — 72110 X-RAY EXAM L-2 SPINE 4/>VWS: CPT | Mod: 26

## 2021-04-13 PROCEDURE — 99213 OFFICE O/P EST LOW 20 MIN: CPT

## 2021-04-13 PROCEDURE — 99072 ADDL SUPL MATRL&STAF TM PHE: CPT

## 2021-04-13 PROCEDURE — 72110 X-RAY EXAM L-2 SPINE 4/>VWS: CPT

## 2021-04-14 NOTE — HISTORY OF PRESENT ILLNESS
[de-identified] : Follow up 4/13/21: Patient now 5 months s/p L5/S1 ALIF with percutaneous posterior instrumentation, doing very well. Reports resolution of lower extremity and low back pain from preop. Back to cycling, about 40 miles/day. Denies new neurologic symptoms. Very happy with result to date.

## 2021-04-14 NOTE — PHYSICAL EXAM
[de-identified] : Well healed incisions, no erythema, drainage, or warmth\par 5/5 strength L2-S1 b/l\par SILT L2-S1 b/l\par wwp\par neg homans b/l.  [de-identified] : XR 4/13/21: hardware intact, good alignment, stable from intraop\par \par XR 1/26/21: hardware intact, good alignment, unchanged from intraop\par \par XR 12/29/20: hardware intact, good alignment, unchanged from intraop\par \par XR 12/2/20: hardware intact, good alignment, unchanged from intraop. \par

## 2021-04-14 NOTE — DISCUSSION/SUMMARY
[de-identified] : Discussed my findings with the patient. Mr. Amaya is now 5 months s/p L5/S1 ALIF with percutaneous posterior instrumentation, doing well. Low back and lower extremity pain resolved from preop. XR stable. Back to regular activities. The patient will follow up with me at 1 year post op, sooner if there is an issue. XR lumbar 4 view at that time. All questions answered.

## 2021-04-26 ENCOUNTER — APPOINTMENT (OUTPATIENT)
Dept: DERMATOLOGY | Facility: CLINIC | Age: 61
End: 2021-04-26
Payer: MEDICAID

## 2021-04-26 ENCOUNTER — APPOINTMENT (OUTPATIENT)
Dept: NEUROLOGY | Facility: CLINIC | Age: 61
End: 2021-04-26
Payer: MEDICAID

## 2021-04-26 ENCOUNTER — LABORATORY RESULT (OUTPATIENT)
Age: 61
End: 2021-04-26

## 2021-04-26 VITALS
RESPIRATION RATE: 16 BRPM | WEIGHT: 185 LBS | OXYGEN SATURATION: 97 % | HEART RATE: 77 BPM | BODY MASS INDEX: 27.4 KG/M2 | HEIGHT: 69 IN | SYSTOLIC BLOOD PRESSURE: 130 MMHG | DIASTOLIC BLOOD PRESSURE: 80 MMHG | TEMPERATURE: 98 F

## 2021-04-26 DIAGNOSIS — G62.9 POLYNEUROPATHY, UNSPECIFIED: ICD-10-CM

## 2021-04-26 PROCEDURE — 99072 ADDL SUPL MATRL&STAF TM PHE: CPT

## 2021-04-26 PROCEDURE — 11102 TANGNTL BX SKIN SINGLE LES: CPT

## 2021-04-26 PROCEDURE — 99214 OFFICE O/P EST MOD 30 MIN: CPT

## 2021-04-26 PROCEDURE — 99213 OFFICE O/P EST LOW 20 MIN: CPT | Mod: 25

## 2021-04-26 RX ORDER — METHOCARBAMOL 500 MG/1
500 TABLET, FILM COATED ORAL
Qty: 21 | Refills: 0 | Status: COMPLETED | COMMUNITY
Start: 2020-11-13

## 2021-04-26 RX ORDER — OXYCODONE AND ACETAMINOPHEN 5; 325 MG/1; MG/1
5-325 TABLET ORAL
Qty: 28 | Refills: 0 | Status: COMPLETED | COMMUNITY
Start: 2020-11-13

## 2021-04-26 RX ORDER — NICOTINE 21 MG/24HR
14 PATCH, TRANSDERMAL 24 HOURS TRANSDERMAL
Qty: 14 | Refills: 0 | Status: ACTIVE | COMMUNITY
Start: 2021-01-04

## 2021-04-26 RX ORDER — PHENYLEPHRINE HCL 10 MG
7 TABLET ORAL
Qty: 14 | Refills: 0 | Status: ACTIVE | COMMUNITY
Start: 2021-01-15

## 2021-04-26 RX ORDER — SODIUM PICOSULFATE, MAGNESIUM OXIDE, AND ANHYDROUS CITRIC ACID 10; 3.5; 12 MG/160ML; G/160ML; G/160ML
10-3.5-12 MG-GM LIQUID ORAL
Qty: 320 | Refills: 0 | Status: COMPLETED | COMMUNITY
Start: 2020-09-10

## 2021-04-26 RX ORDER — ATORVASTATIN CALCIUM 10 MG/1
10 TABLET, FILM COATED ORAL
Qty: 30 | Refills: 0 | Status: ACTIVE | COMMUNITY
Start: 2021-04-17

## 2021-04-26 RX ORDER — NICOTINE 21 MG/24HR
21 PATCH, TRANSDERMAL 24 HOURS TRANSDERMAL
Qty: 28 | Refills: 0 | Status: COMPLETED | COMMUNITY
Start: 2020-12-03 | End: 2021-04-26

## 2021-04-26 NOTE — PHYSICAL EXAM
[Alert] : alert [Oriented x 3] : ~L oriented x 3 [Well Nourished] : well nourished [Conjunctiva Non-injected] : conjunctiva non-injected [No Visual Lymphadenopathy] : no visual  lymphadenopathy [No Clubbing] : no clubbing [No Edema] : no edema [No Bromhidrosis] : no bromhidrosis [No Chromhidrosis] : no chromhidrosis [Full Body Skin Exam Performed] : performed [FreeTextEntry3] : large WHI on back (closed together)\par smaller WHI from cyst removal\par numerous brown macules and papules\par L midback with larger homogenous brown papule with multiple shades and irregular borders\par no lad head, neck, axilla, groin

## 2021-04-26 NOTE — HISTORY OF PRESENT ILLNESS
[FreeTextEntry1] : Fu MM [de-identified] : hx invasive melanoma on back x 2\par 4 mo cbe\par no new or changing spots\par no night sweats, weight loss, lad\par

## 2021-04-26 NOTE — DISCUSSION/SUMMARY
[FreeTextEntry1] : Impression:\par 1) bilateral parathesias and 50% sensory deficit in the hands, but on testing in a strip that travels proximal to the elbows.  This is more consistent with a radiculopathy, , which could be C8-T1, or possibly a double hit phenomenon, with a milder nerve root plus an ulnar or median nerve entrapment.  The parethesias feel more into the middle finger, which does not align well with the other findings, except the oppenens weakness below.\par 2) possible motor deficits as well, particularly with prolonged use, right Opponens, and left  over time.  This sounds to be more median in nature.\par 3) essential tremor with excellent response to primidone, now at 50/100.\par \par Plan:\par 1) C-spine to rule out clear nerve root impingement\par 2) EMG/NCS to help differentiate the potential problems.\par 3) continue primidone 50/100, f/u in about 6 months.

## 2021-04-26 NOTE — PHYSICAL EXAM
[FreeTextEntry1] : General:\par Constitutional:  Sitting comfortably in NAD.\par Psychiatric: well-groomed, appropriate affect\par Ears, Nose, Throat: no abnormalities, mucus membranes moist\par Neck: supple\par Extremities: no edema, clubbing or cyanosis\par Skin: no rash or neuro-cutaneous signs \par \par Cognitive:\par Orientation, language, memory and knowledge screens intact.\par \par Cranial Nerves:\par II: MARIAMA. III/IV/VI: EOM Full.  VII: Face appears symmetric VIII: Normal to screening\par IX/X: normal phonation  XI: Trapezius Symmetric  XII: Tongue midline\par \par Motor:\par Full power throughout\par however, right opponens weaker than the left\par \par Sensory:\par decreased 50% point sensation on the blades of the hands up past the elbows.\par Phelen's negative\par \par \par Power: no pronator drift\par \par Narrow based gait\par \par \par \par

## 2021-04-27 ENCOUNTER — NON-APPOINTMENT (OUTPATIENT)
Age: 61
End: 2021-04-27

## 2021-04-27 LAB
ALBUMIN SERPL ELPH-MCNC: 4.6 G/DL
ALP BLD-CCNC: 70 U/L
ALT SERPL-CCNC: 30 U/L
ANA SER IF-ACNC: NEGATIVE
ANION GAP SERPL CALC-SCNC: 9 MMOL/L
AST SERPL-CCNC: 28 U/L
BASOPHILS # BLD AUTO: 0.04 K/UL
BASOPHILS NFR BLD AUTO: 1.1 %
BILIRUB SERPL-MCNC: 0.2 MG/DL
BUN SERPL-MCNC: 26 MG/DL
CALCIUM SERPL-MCNC: 9.6 MG/DL
CHLORIDE SERPL-SCNC: 104 MMOL/L
CK SERPL-CCNC: 135 U/L
CO2 SERPL-SCNC: 26 MMOL/L
CREAT SERPL-MCNC: 1.02 MG/DL
ENA SCL70 IGG SER IA-ACNC: 1.9 AL
EOSINOPHIL # BLD AUTO: 0.2 K/UL
EOSINOPHIL NFR BLD AUTO: 5.4 %
ERYTHROCYTE [SEDIMENTATION RATE] IN BLOOD BY WESTERGREN METHOD: 6 MM/HR
ESTIMATED AVERAGE GLUCOSE: 111 MG/DL
FERRITIN SERPL-MCNC: 141 NG/ML
FOLATE SERPL-MCNC: >20 NG/ML
GLUCOSE SERPL-MCNC: 93 MG/DL
HBA1C MFR BLD HPLC: 5.5 %
HCT VFR BLD CALC: 48.5 %
HGB BLD-MCNC: 14.8 G/DL
IMM GRANULOCYTES NFR BLD AUTO: 0.3 %
LYMPHOCYTES # BLD AUTO: 0.91 K/UL
LYMPHOCYTES NFR BLD AUTO: 24.6 %
MAGNESIUM SERPL-MCNC: 2.1 MG/DL
MAN DIFF?: NORMAL
MCHC RBC-ENTMCNC: 30.5 GM/DL
MCHC RBC-ENTMCNC: 32.2 PG
MCV RBC AUTO: 105.7 FL
MONOCYTES # BLD AUTO: 0.44 K/UL
MONOCYTES NFR BLD AUTO: 11.9 %
NEUTROPHILS # BLD AUTO: 2.1 K/UL
NEUTROPHILS NFR BLD AUTO: 56.7 %
PLATELET # BLD AUTO: 201 K/UL
POTASSIUM SERPL-SCNC: 4.5 MMOL/L
PROT SERPL-MCNC: 6.8 G/DL
RBC # BLD: 4.59 M/UL
RBC # FLD: 14.6 %
RHEUMATOID FACT SER QL: <10 IU/ML
SODIUM SERPL-SCNC: 140 MMOL/L
THYROGLOB AB SERPL-ACNC: <20 IU/ML
THYROPEROXIDASE AB SERPL IA-ACNC: 12.7 IU/ML
TSH SERPL-ACNC: 4.69 UIU/ML
VIT B12 SERPL-MCNC: 678 PG/ML
WBC # FLD AUTO: 3.7 K/UL

## 2021-04-28 LAB
A-TUMOR NECROSIS FACT SERPL-MCNC: <1.7 PG/ML
IGNF SERPL-MCNC: <4.2 PG/ML
IL10 SERPL-MCNC: 4.1 PG/ML
IL12 SERPL-MCNC: <1.9 PG/ML
IL13 SERPL-MCNC: <1.7 PG/ML
IL17A SERPL-MCNC: <1.4 PG/ML
IL2 SERPL-MCNC: 601.1 PG/ML
IL2 SERPL-MCNC: <2.1 PG/ML
IL4 SERPL-MCNC: <2.2 PG/ML
IL6 SERPL-MCNC: <2 PG/ML
IL8 SERPL-MCNC: <3 PG/ML
INTERLEUKIN 1 BETA: <6.5 PG/ML
INTERLEUKIN 5: <2.1 PG/ML

## 2021-04-29 LAB
ALBUMIN MFR SERPL ELPH: 65.3 %
ALBUMIN SERPL-MCNC: 4.4 G/DL
ALBUMIN/GLOB SERPL: 1.8 RATIO
ALPHA1 GLOB MFR SERPL ELPH: 4.8 %
ALPHA1 GLOB SERPL ELPH-MCNC: 0.3 G/DL
ALPHA2 GLOB MFR SERPL ELPH: 9.8 %
ALPHA2 GLOB SERPL ELPH-MCNC: 0.7 G/DL
B-GLOBULIN MFR SERPL ELPH: 10.7 %
B-GLOBULIN SERPL ELPH-MCNC: 0.7 G/DL
GAMMA GLOB FLD ELPH-MCNC: 0.6 G/DL
GAMMA GLOB MFR SERPL ELPH: 9.4 %
INTERPRETATION SERPL IEP-IMP: NORMAL
M PROTEIN SPEC IFE-MCNC: NORMAL
PROT SERPL-MCNC: 6.8 G/DL
PROT SERPL-MCNC: 6.8 G/DL

## 2021-05-03 ENCOUNTER — NON-APPOINTMENT (OUTPATIENT)
Age: 61
End: 2021-05-03

## 2021-05-04 LAB — GAD65 AB SER-MCNC: 0 NMOL/L

## 2021-05-14 ENCOUNTER — APPOINTMENT (OUTPATIENT)
Dept: PHYSICAL MEDICINE AND REHAB | Facility: CLINIC | Age: 61
End: 2021-05-14
Payer: MEDICAID

## 2021-05-14 PROCEDURE — 99214 OFFICE O/P EST MOD 30 MIN: CPT

## 2021-05-14 NOTE — REVIEW OF SYSTEMS
[Joint Pain] : joint pain [Joint Stiffness] : joint stiffness [Muscle Pain] : muscle pain [Negative] : Heme/Lymph [FreeTextEntry9] : as per HPI [Patient Intake Form Reviewed] : Patient intake form was reviewed [FreeTextEntry1] : Constitutional: no chills, not feeling tired and no fever. \par Eyes: no discharge, no pain and no redness. \par ENT: no nasal discharge, no nosebleeds and no sore throat. \par Cardiovascular: no chest pain, no palpitations and the heart rate was not fast. \par Respiratory: no shortness of breath, no cough and no wheezing. \par Gastrointestinal: no abdominal pain, no diarrhea, no vomiting and no melena. \par Genitourinary: no pelvic pain, no dysuria, no abnormal urethral discharge and no frequency. \par Integumentary: no skin lesions and no change in a mole. \par Neurological: no dizziness, no fainting and no convulsions. \par Endocrine: no deepening of the voice and no hot flashes. \par Hematologic/Lymphatic: does not bleed easily, no swollen glands and no cervical lymphadenopathy. \par Musculoskeletal: as per HPI, otherwise denies additional joint pain, effusions, stiffness.\par All other review of systems are negative except as noted.

## 2021-05-14 NOTE — ASSESSMENT
[FreeTextEntry1] : Impression:\par 1. Right C8/T1 Radiculopathy\par 2. Bilateral Carpal Tunnel Syndrome\par \par Plan: The history, physical examination, and imaging were reviewed. The patient's symptoms are consistent with possible cervical radiculopathy superimposed on carpal tunnel syndrome. The diagnosis were discussed in detail with the patient. We discussed all the potential treatment options including physical therapy, oral medication, interventional procedures, and surgery; as well as alternative therapeutics such as acupuncture and massage. We also discussed the importance of weight loss, ergonomics, and posture in the management of the condition as well as for overall health benefits. I am recommending that the patient obtain MRI cervical spine to evaluate for disc pathology. also, agree with Neurology to obtain EMG/NCS to better characterize bilateral hand numbness. The patient will follow up in for MRI results review. The patient expressed verbal understanding and is in agreement with the plan of care. All of the patient's questions and concerns were addressed during today's visit.\par \par

## 2021-05-14 NOTE — PHYSICAL EXAM
[FreeTextEntry1] : CERVICAL EXAM\par GEN: AAOx3, NAD.\par GAIT: Non antalgic, Normal reciprocating heel to toe.\par STRENGTH: 5/5 bilateral shoulder abductors, elbow flexors, elbow extensors, wrist extensors, hand intrinsics, long finger flexors to D3 and D5.\par TONE: Normal, No clonus.\par REFLEXES: Aguiar's negative bilaterally.\par SENSATION: Grossly intact to light touch bilateral upper extremities. Sensation is present to LT in hands.\par INSP: Spine alignment is midline, with no evidence of scoliosis.\par CERVICAL ROM: Flexion, extension, side-bending, rotation, oblique extension all full and pain free.  \par SHOULDER ROM: Full and pain free bilaterally.\par PALP: No TTP midline spinous processes. There is TTP to bilateral paravertebral muscles, trapezius, levator scapulae. shoulder regions are non-painful. \par SPECIAL: Spurling's (-) B/L. Axial Compression (-).  \par HANDS: No intrinsic hand wasting or wasting of the APB.  strength is 5/5. + Tinel's and reverse Phalen's bilaterally. \par \par

## 2021-05-14 NOTE — HISTORY OF PRESENT ILLNESS
[FreeTextEntry1] : Mr. ORAL ESPINOZA is a very pleasant 60 year male who comes in for evaluation of a new issue, neck pain that has been ongoing for several years without any specific injury or inciting event. The pain is located primarily in the neck and is associated with radiating symptoms to the right arm and paresthesias in both hands. Radiating symptoms down the right arm  are dull/ numb like sensation underneath the armpit, constant in nature and described as numb, dull. The pain is rated as 0/10 and ranges from 4-7/10. The patient's symptoms are aggravated by rotating his neck and alleviated by rest. For this hand paresthesias, he states it it aggravated by using his hands and sometimes wakes him up in the middle of the night and has to shake them out for relief.  The patient’s work consists of a  for many years, now retired.The patient denies any weakness, or bowel/bladder dysfunction, or gait imbalance. The patient has no other complaints at this time.\par \par

## 2021-05-24 ENCOUNTER — APPOINTMENT (OUTPATIENT)
Dept: PHYSICAL MEDICINE AND REHAB | Facility: CLINIC | Age: 61
End: 2021-05-24
Payer: MEDICAID

## 2021-05-24 ENCOUNTER — NON-APPOINTMENT (OUTPATIENT)
Age: 61
End: 2021-05-24

## 2021-05-24 VITALS — WEIGHT: 185 LBS | BODY MASS INDEX: 27.4 KG/M2 | HEIGHT: 69 IN | RESPIRATION RATE: 18 BRPM

## 2021-05-24 DIAGNOSIS — G89.29 CERVICALGIA: ICD-10-CM

## 2021-05-24 DIAGNOSIS — M54.2 CERVICALGIA: ICD-10-CM

## 2021-05-24 PROCEDURE — 99214 OFFICE O/P EST MOD 30 MIN: CPT | Mod: 95

## 2021-05-24 NOTE — PHYSICAL EXAM
[Normal] : The appearance of the neck was normal, no neck mass was observed, the thyroid was not enlarged and there were no palpable thyroid nodules [de-identified] : RR 16 [de-identified] : pain with ROM c spine  [de-identified] : hands no wasting + tinels R wrist  [de-identified] :  NL

## 2021-05-24 NOTE — HISTORY OF PRESENT ILLNESS
[FreeTextEntry1] : Mr. ORAL ESPINOZA is a very pleasant 60 year male who seen for evaluation of bilateral hand numbness R > L    that has been ongoing for years with associated neck pain   without any specific injury or inciting event. The pain is located primarily post neck  intermittent in nature and described as sharp . The pain is rated as 0/10 during today's visit, and ranges from 0-6/10. The patient's symptoms are aggravated by sleeping or riding bike  and alleviated by shaking hands out . The patient denies any loss of dexterity , weakness, or bowel/bladder dysfunction. The patient has no other complaints at this time.\par He is R handed \par he is a  \par

## 2021-05-24 NOTE — REVIEW OF SYSTEMS
[Patient Intake Form Reviewed] : Patient intake form was reviewed [Fever] : no fever [Chills] : no chills [Fatigue] : no fatigue [Chest Pain] : no chest pain [Leg Claudication] : no intermittent leg claudication [Negative] : Heme/Lymph

## 2021-05-24 NOTE — ASSESSMENT
[FreeTextEntry1] : \par PLAN AND RECOMMENDATIONS :\par \par We discussed differential diagnosis and clinical impression\par agree with EMG \par rule out CTS/ cerv radic or double crush \par \par Recommend\par .symptomatic care and support wrist splints \par  medications NSAIDS as needed -  OTC fine (-personal preference )-(once or twice a day), -warned of  possible GI side effects -advised to take with meals or add over the counter Nexium, if sensitive\par \par  imaging pending auth \par \par \par \par  relative rest and avoidance of painful activity where possible \par  increasing activity as discussed \par  return for EMG \par Information given to patient about EMG and Nerve Conduction Study Examination including  planning, differential diagnosis to rule in /rule out ,duration of the test ,precautions (if patient on blood thinner.has bleeding disorder or  pace maker device etc -still possible to undergo with care), side effects(benign-limited to short term bruising and discomfort/pain)  \par The protocol of temp checks upon arrival ,disinfection procedure of waiting room and the lab explained- reassured. \par All questions answered. \par Patient instructed to book appointment upon conclusion of appointment\par \par Information sheet ' Answers to your Questions on EMG " forwarded to patient to read prior to testing, with further information about training,background and the procedure itself .\par \par

## 2021-06-10 ENCOUNTER — APPOINTMENT (OUTPATIENT)
Dept: PHYSICAL MEDICINE AND REHAB | Facility: CLINIC | Age: 61
End: 2021-06-10
Payer: MEDICAID

## 2021-06-10 ENCOUNTER — NON-APPOINTMENT (OUTPATIENT)
Age: 61
End: 2021-06-10

## 2021-06-10 VITALS — TEMPERATURE: 97.6 F | WEIGHT: 185 LBS | HEIGHT: 69 IN | BODY MASS INDEX: 27.4 KG/M2 | RESPIRATION RATE: 17 BRPM

## 2021-06-10 DIAGNOSIS — G56.21 LESION OF ULNAR NERVE, RIGHT UPPER LIMB: ICD-10-CM

## 2021-06-10 PROCEDURE — 95886 MUSC TEST DONE W/N TEST COMP: CPT

## 2021-06-10 PROCEDURE — 95913 NRV CNDJ TEST 13/> STUDIES: CPT

## 2021-06-16 ENCOUNTER — APPOINTMENT (OUTPATIENT)
Dept: PHYSICAL MEDICINE AND REHAB | Facility: CLINIC | Age: 61
End: 2021-06-16
Payer: MEDICAID

## 2021-06-16 VITALS
BODY MASS INDEX: 27.4 KG/M2 | SYSTOLIC BLOOD PRESSURE: 130 MMHG | TEMPERATURE: 97.7 F | OXYGEN SATURATION: 98 % | HEART RATE: 72 BPM | WEIGHT: 185 LBS | HEIGHT: 69 IN | DIASTOLIC BLOOD PRESSURE: 70 MMHG

## 2021-06-16 PROCEDURE — 20611 DRAIN/INJ JOINT/BURSA W/US: CPT | Mod: LT

## 2021-06-16 PROCEDURE — 99214 OFFICE O/P EST MOD 30 MIN: CPT | Mod: 25

## 2021-06-16 NOTE — HISTORY OF PRESENT ILLNESS
[FreeTextEntry1] : Mr. ORAL ESPINOZA is a very pleasant 60 year male who comes in for follow up evaluation of neck and arm pain that has been ongoing for several years without any specific injury or inciting event. The pain is located primarily in the neck and is associated with radiating symptoms to the right arm and paresthesias in both hands. Radiating symptoms down the right arm  are dull/ numb like sensation underneath the armpit, constant in nature and described as numb, dull. The pain is rated as 0/10 and ranges from 4-7/10. The patient's symptoms are aggravated by rotating his neck and alleviated by rest. For this hand paresthesias, he states it it aggravated by using his hands and sometimes wakes him up in the middle of the night and has to shake them out for relief.  The patient’s work consists of a  for many years, now retired. He also complains of new onset Left shoulder pain that started after carrying a lot heavy objects while helping a friend. The patient denies any weakness, or bowel/bladder dysfunction, or gait imbalance. The patient has no other complaints at this time.\par \par

## 2021-06-16 NOTE — PROCEDURE
[de-identified] : Procedure: LEFT Shoulder Subacromial Injection with Ultrasound Guidance\par \par Findings: The LEFT shoulder was examined under ultrasound using a 10mHz linear array transducer.  There was evidence of minimal bursal effusion. There was also evidence of supraspinatus tendinopathy.\par \par Injectate: 1 mL Kenalog (40mg/mL) with 3mL 1% Lidocaine\par \par After risks, benefits and alternatives were discussed and the patient expressed understanding, informed consent was obtained. Risks include but are not limited to bleeding, infection, worsening pain, nerve damage, scar formation. \par \par Ultrasound was indicated for needle guidance, to ensure needle placement, and to assess for any effusions or vasculature in the path of the needle. \par \par The LEFT subacromial bursa was identified by ultrasound, and probe location was marked on the skin. Ultrasound findings are noted above.  The overlying area was prepped and draped in a sterile fashion with Chloraprep.  After skin preparation, a 25 gauge needle was used to anesthetize the skin with 2 mL of 1% Lidocaine. Then a 1.5" 25G needle was directed under direct ultrasound guidance into the subacromial/subdeltoid bursa using an in-plane lateral to medial approach. The above mentioned injectate was administered. Excellent flow of fluid was noted in the subacromial space. Ultrasound images were stored for reference as part of the patient’s permanent record.\par \par At the conclusion of the procedure, all needles were removed and pressure was applied to the area to ensure hemostasis. The skin was washed and a band-aid was used to cover the injection site.  There were no complications during or after the procedure. The patient reported improvement in symptoms following the procedure without any weakness or numbness. Post procedure instructions and follow up appointment were given. If there are any complications, the patient was instructed to call the office. \par \par

## 2021-06-16 NOTE — PHYSICAL EXAM
[FreeTextEntry1] : CERVICAL EXAM\par GEN: AAOx3, NAD.\par GAIT: Non antalgic, Normal reciprocating heel to toe.\par STRENGTH: 5/5 bilateral shoulder abductors, elbow flexors, elbow extensors, wrist extensors, hand intrinsics, long finger flexors to D3 and D5.\par TONE: Normal, No clonus.\par REFLEXES: Aguiar's negative bilaterally.\par SENSATION: Grossly intact to light touch bilateral upper extremities. Sensation is present to LT in hands.\par INSP: Spine alignment is midline, with no evidence of scoliosis.\par CERVICAL ROM: Flexion, extension, side-bending, rotation, oblique extension all full and pain free.  \par SHOULDER ROM: Full (+) pain L-ABd/IR/ER. \par PALP: No TTP midline spinous processes. There is TTP to bilateral paravertebral muscles, trapezius, levator scapulae. shoulder regions are non-painful. \par SPECIAL: Spurling's (-) B/L. Axial Compression (-).  \par HANDS: No intrinsic hand wasting or wasting of the APB.  strength is 5/5. + Tinel's and reverse Phalen's bilaterally. \par \par

## 2021-06-16 NOTE — DATA REVIEWED
[FreeTextEntry1] :  CS 05/2021: C4-5 Grade 1 anterolisthesis with left facet hypertrophy. Left>Right foraminal narrowing. C5-6 Disc-osteophyte complex Left>Right right foraminal stenoses. C6-7 Disc-osteophyte complex Left>Right foraminal narrowing.

## 2021-06-16 NOTE — ASSESSMENT
[FreeTextEntry1] : Impression:\par 1. Right C7 Radiculopathy\par 2. Bilateral Carpal Tunnel Syndrome\par 3. Left Shoulder Impingement\par \par Plan: The history, physical examination, and new imaging were reviewed. The patient's symptoms are consistent with Right C7 radiculopathy superimposed on carpal tunnel syndrome. The imaging and diagnosis were discussed in detail with the patient. We discussed all the potential treatment options including physical therapy, oral medication, interventional procedures, and surgery; as well as alternative therapeutics such as acupuncture and massage. We also discussed the importance of weight loss, ergonomics, and posture in the management of the condition as well as for overall health benefits. I am recommending that the patient obtain MRI cervical spine to evaluate for disc pathology. also, agree with Neurology to obtain EMG/NCS to better characterize bilateral hand numbness. The patient will follow up in for MRI results review. The patient expressed verbal understanding and is in agreement with the plan of care. All of the patient's questions and concerns were addressed during today's visit.\par \par

## 2021-06-29 ENCOUNTER — APPOINTMENT (OUTPATIENT)
Dept: PHYSICAL MEDICINE AND REHAB | Facility: CLINIC | Age: 61
End: 2021-06-29
Payer: MEDICAID

## 2021-07-02 ENCOUNTER — APPOINTMENT (OUTPATIENT)
Dept: ORTHOPEDIC SURGERY | Facility: CLINIC | Age: 61
End: 2021-07-02
Payer: MEDICAID

## 2021-07-02 VITALS — RESPIRATION RATE: 16 BRPM | BODY MASS INDEX: 26.48 KG/M2 | WEIGHT: 185 LBS | HEIGHT: 70 IN

## 2021-07-02 PROCEDURE — 99215 OFFICE O/P EST HI 40 MIN: CPT

## 2021-07-06 ENCOUNTER — APPOINTMENT (OUTPATIENT)
Dept: NEUROLOGY | Facility: CLINIC | Age: 61
End: 2021-07-06
Payer: MEDICAID

## 2021-07-06 ENCOUNTER — APPOINTMENT (OUTPATIENT)
Dept: PHYSICAL MEDICINE AND REHAB | Facility: CLINIC | Age: 61
End: 2021-07-06
Payer: MEDICAID

## 2021-07-06 ENCOUNTER — TRANSCRIPTION ENCOUNTER (OUTPATIENT)
Age: 61
End: 2021-07-06

## 2021-07-06 DIAGNOSIS — G56.22 LESION OF ULNAR NERVE, LEFT UPPER LIMB: ICD-10-CM

## 2021-07-06 PROCEDURE — 62321 NJX INTERLAMINAR CRV/THRC: CPT

## 2021-07-06 PROCEDURE — 99213 OFFICE O/P EST LOW 20 MIN: CPT | Mod: 95

## 2021-07-06 NOTE — HISTORY OF PRESENT ILLNESS
[FreeTextEntry1] : Braxton is a 58 yo man with a history of tremor, which has improved, but now with symptoms of hand numbness.\par \par Seen by Dr. Lee, had C-spine MRI which led to an epidural injection.\par \par Dr. Russo and EMG with Dr. Wheatley - felt did not require carpel tunnel surgery.\par Bilateral mild carpel tunnel with sensory conductions slow, and a left ulnar mild entrapmen at Guyan's canal.\par \par Continues to develop numbness in the fingers on a bike and lifting weights.\par \par Starting wrist splints at night this past Friday.\par No real improvement but just started the interventions.\par \par Primidone doing well, no tremors.\par \par

## 2021-07-06 NOTE — DISCUSSION/SUMMARY
[FreeTextEntry1] : Impression:\par 1) bilateral mild carpel tunnel and a left Guyan's canal neuropathy, just began with wrist splints\par 2) radicular symptoms - just had epidural injection by Dr. Lee today.\par 3) essential tremor with excellent response to primidone, now at 50/100.\par \par Plan:\par 1) continue wrist splints, increased handlebar padding, ice may help\par 2) re-evaluation of cervical spine by Dr. Lee\par 3) continue primidone 50/100, f/u in about 10-12 months.

## 2021-07-12 ENCOUNTER — APPOINTMENT (OUTPATIENT)
Dept: NEUROLOGY | Facility: CLINIC | Age: 61
End: 2021-07-12

## 2021-07-13 ENCOUNTER — APPOINTMENT (OUTPATIENT)
Dept: ORTHOPEDIC SURGERY | Facility: CLINIC | Age: 61
End: 2021-07-13
Payer: MEDICAID

## 2021-07-13 ENCOUNTER — OUTPATIENT (OUTPATIENT)
Dept: OUTPATIENT SERVICES | Facility: HOSPITAL | Age: 61
LOS: 1 days | End: 2021-07-13
Payer: COMMERCIAL

## 2021-07-13 ENCOUNTER — RESULT REVIEW (OUTPATIENT)
Age: 61
End: 2021-07-13

## 2021-07-13 DIAGNOSIS — Z98.890 OTHER SPECIFIED POSTPROCEDURAL STATES: Chronic | ICD-10-CM

## 2021-07-13 DIAGNOSIS — Z96.641 PRESENCE OF RIGHT ARTIFICIAL HIP JOINT: Chronic | ICD-10-CM

## 2021-07-13 PROCEDURE — 99214 OFFICE O/P EST MOD 30 MIN: CPT

## 2021-07-13 PROCEDURE — 72110 X-RAY EXAM L-2 SPINE 4/>VWS: CPT

## 2021-07-13 PROCEDURE — 72110 X-RAY EXAM L-2 SPINE 4/>VWS: CPT | Mod: 26

## 2021-07-13 RX ORDER — ESCITALOPRAM OXALATE 5 MG/1
5 TABLET ORAL
Qty: 30 | Refills: 0 | Status: ACTIVE | COMMUNITY
Start: 2021-06-18

## 2021-07-13 RX ORDER — NICOTINE POLACRILEX 2 MG/1
2 GUM, CHEWING BUCCAL
Qty: 100 | Refills: 0 | Status: ACTIVE | COMMUNITY
Start: 2021-05-06

## 2021-07-26 NOTE — DISCUSSION/SUMMARY
[de-identified] : Discussed the results of the patient's history, physical exam, and imaging. Mr. Amaya is now 8 months s/p L5/S1 ALIF with percutaneous posterior instrumentation, doing very well. Reports resolution of lower extremity and low back pain from preop. Increasing activities/exercise. XR stable. Activity instructions given. Follow up with me at 1 year post op, sooner if there is an issue. XR lumbar 4 view at that time. All questions answered. \par

## 2021-07-26 NOTE — HISTORY OF PRESENT ILLNESS
[de-identified] : Follow up 7/13/21: Patient now 8 months s/p L5/S1 ALIF with percutaneous posterior instrumentation, doing very well. Reports resolution of lower extremity and low back pain from preop. Increasing activities/exercise. Denies new neurologic symptoms. Very happy with result to date.

## 2021-07-26 NOTE — PHYSICAL EXAM
[de-identified] : Well healed incisions, no erythema, drainage, or warmth\par 5/5 strength L2-S1 b/l\par SILT L2-S1 b/l\par wwp\par neg homans b/l.  [de-identified] : XR 7/13/21: hardware intact, good alignment, stable from intraop\par

## 2021-08-12 ENCOUNTER — NON-APPOINTMENT (OUTPATIENT)
Age: 61
End: 2021-08-12

## 2021-08-13 ENCOUNTER — APPOINTMENT (OUTPATIENT)
Dept: PHYSICAL MEDICINE AND REHAB | Facility: CLINIC | Age: 61
End: 2021-08-13
Payer: MEDICAID

## 2021-08-13 ENCOUNTER — APPOINTMENT (OUTPATIENT)
Dept: ORTHOPEDIC SURGERY | Facility: CLINIC | Age: 61
End: 2021-08-13
Payer: MEDICAID

## 2021-08-13 VITALS — WEIGHT: 185 LBS | BODY MASS INDEX: 26.48 KG/M2 | HEIGHT: 70 IN | RESPIRATION RATE: 16 BRPM

## 2021-08-13 PROCEDURE — 20526 THER INJECTION CARP TUNNEL: CPT | Mod: RT

## 2021-08-13 PROCEDURE — 99213 OFFICE O/P EST LOW 20 MIN: CPT | Mod: 25

## 2021-08-13 PROCEDURE — 20611 DRAIN/INJ JOINT/BURSA W/US: CPT | Mod: LT

## 2021-08-13 PROCEDURE — 99214 OFFICE O/P EST MOD 30 MIN: CPT | Mod: 25

## 2021-08-16 ENCOUNTER — LABORATORY RESULT (OUTPATIENT)
Age: 61
End: 2021-08-16

## 2021-08-16 ENCOUNTER — APPOINTMENT (OUTPATIENT)
Dept: DERMATOLOGY | Facility: CLINIC | Age: 61
End: 2021-08-16
Payer: MEDICAID

## 2021-08-16 PROCEDURE — 99213 OFFICE O/P EST LOW 20 MIN: CPT | Mod: 25

## 2021-08-16 PROCEDURE — 11103 TANGNTL BX SKIN EA SEP/ADDL: CPT | Mod: 59

## 2021-08-16 PROCEDURE — 11102 TANGNTL BX SKIN SINGLE LES: CPT

## 2021-08-16 NOTE — HISTORY OF PRESENT ILLNESS
[FreeTextEntry1] : Fu MM [de-identified] : 62 yo M established patient here for above\par hx invasive melanoma on back x 2\par no new or changing spots\par no night sweats, weight loss, lad\par swims often at [employer] for workout and PT\par

## 2021-08-16 NOTE — PHYSICAL EXAM
[Alert] : alert [Oriented x 3] : ~L oriented x 3 [Well Nourished] : well nourished [Conjunctiva Non-injected] : conjunctiva non-injected [No Visual Lymphadenopathy] : no visual  lymphadenopathy [No Clubbing] : no clubbing [No Edema] : no edema [No Bromhidrosis] : no bromhidrosis [No Chromhidrosis] : no chromhidrosis [Full Body Skin Exam Performed] : performed [FreeTextEntry3] : large WHI on back (closed together) with tan and faintly blue firm papule in center\par L lower back asymmetric two tone brown papule\par innumerable brown macules and papules on trunk, tanned skin \par no lad head, neck, axilla, groin

## 2021-08-26 NOTE — HISTORY OF PRESENT ILLNESS
[FreeTextEntry1] : Mr. ORAL ESPINOZA is a very pleasant 60 year male who comes in for follow up evaluation of neck and arm pain after undergoing an GABBY on 07/06/21. The patient reports approximately 70% improvement following the procedure. There is still some residual discomfort, which remains focal to the left shoulder, non-radiating, intermittent and described as sharp. The pain is rated as 0/10 and ranges from 0-7/10. The patient's symptoms are aggravated by reaching behind his back or overhead and and alleviated by rest. He reports good relief with CSI previously however only partial relief. He has been compliant with HEP for his RTC as well which provides some additional relief. The patient denies any weakness, or bowel/bladder dysfunction, or gait imbalance. The patient has no other complaints at this time.\par \par

## 2021-08-26 NOTE — ASSESSMENT
[FreeTextEntry1] : Impression:\par 1. Right C7 Radiculopathy\par 2. Bilateral Carpal Tunnel Syndrome\par 3. Left Shoulder Impingement\par \par Plan: The history, physical examination, and new imaging were reviewed. The patient responded well to GABBY and present symptoms are more consistent with Left shoulder impingement. We reviewed the potential treatment options including physical therapy, oral medication, interventional procedures, and surgery; as well as alternative therapeutics such as acupuncture and massage. The patient is interested in interventional options for symptom reduction; USG SAB CSI was performed in the office today, please see procedure note for full detail. The patients HEP was reviewed and modifications were made to improve his home program for further benefit. The patient expressed verbal understanding and is in agreement with the plan of care. All of the patient's questions and concerns were addressed during today's visit.\par \par

## 2021-08-26 NOTE — PROCEDURE
[de-identified] : Procedure: LEFT Shoulder Subacromial Injection with Ultrasound Guidance\par \par Findings: The LEFT shoulder was examined under ultrasound using a 10mHz linear array transducer.  There was evidence of minimal bursal effusion. There was also evidence of supraspinatus tendinopathy.\par \par Injectate: 1 mL Kenalog (40mg/mL) with 3mL 1% Lidocaine\par \par After risks, benefits and alternatives were discussed and the patient expressed understanding, informed consent was obtained. Risks include but are not limited to bleeding, infection, worsening pain, nerve damage, scar formation. \par \par Ultrasound was indicated for needle guidance, to ensure needle placement, and to assess for any effusions or vasculature in the path of the needle. \par \par The LEFT subacromial bursa was identified by ultrasound, and probe location was marked on the skin. Ultrasound findings are noted above.  The overlying area was prepped and draped in a sterile fashion with Chloraprep.  After skin preparation, a 25 gauge needle was used to anesthetize the skin with 2 mL of 1% Lidocaine. Then a 1.5" 25G needle was directed under direct ultrasound guidance into the subacromial/subdeltoid bursa using an in-plane lateral to medial approach. The above mentioned injectate was administered. Excellent flow of fluid was noted in the subacromial space. Ultrasound images were stored for reference as part of the patient’s permanent record.\par \par At the conclusion of the procedure, all needles were removed and pressure was applied to the area to ensure hemostasis. The skin was washed and a band-aid was used to cover the injection site.  There were no complications during or after the procedure. The patient reported improvement in symptoms following the procedure without any weakness or numbness. Post procedure instructions and follow up appointment were given. If there are any complications, the patient was instructed to call the office. \par \par

## 2021-08-27 ENCOUNTER — NON-APPOINTMENT (OUTPATIENT)
Age: 61
End: 2021-08-27

## 2021-09-15 ENCOUNTER — APPOINTMENT (OUTPATIENT)
Dept: ORTHOPEDIC SURGERY | Facility: CLINIC | Age: 61
End: 2021-09-15

## 2021-09-15 ENCOUNTER — RESULT REVIEW (OUTPATIENT)
Age: 61
End: 2021-09-15

## 2021-09-15 ENCOUNTER — OUTPATIENT (OUTPATIENT)
Dept: OUTPATIENT SERVICES | Facility: HOSPITAL | Age: 61
LOS: 1 days | End: 2021-09-15
Payer: SUBSIDIZED

## 2021-09-15 DIAGNOSIS — Z98.890 OTHER SPECIFIED POSTPROCEDURAL STATES: Chronic | ICD-10-CM

## 2021-09-15 DIAGNOSIS — Z96.641 PRESENCE OF RIGHT ARTIFICIAL HIP JOINT: Chronic | ICD-10-CM

## 2021-09-15 PROCEDURE — 73502 X-RAY EXAM HIP UNI 2-3 VIEWS: CPT | Mod: 26,RT

## 2021-09-15 PROCEDURE — 73502 X-RAY EXAM HIP UNI 2-3 VIEWS: CPT

## 2021-09-15 PROCEDURE — ZZZZZ: CPT

## 2021-09-22 NOTE — PHYSICAL THERAPY INITIAL EVALUATION ADULT - GAIT DEVIATIONS NOTED, PT EVAL
1.38 decreased lesia/increased stride width/decreased weight-shifting ability/decreased stride length

## 2021-09-23 NOTE — HISTORY OF PRESENT ILLNESS
Patient was informed of 90 day supply sent to pharmacy on 08/25/2021. [FreeTextEntry1] : Braxton is a 60 yo man with a history of tremor, which has improved, but now with symptoms of hand numbness.\par \par He reports this is a chronic problem, worse than the right.\par He notes it worse with riding the hybrid bike, reading a book and also doing light weights.\par There is a tingling sense it may begin in the armpit region.\par It can occur in the middle of the night as well, and he feels he may need to shake out his arms.\par \par Neck has always felt tight.  He was working as a , but this past year he has been doing less manual work.\par Right hip replacement, and an L5-S1 fusion.  Both he considers to be successful.\par \par Primidone remains at 50/100, and no issues currently.  Handwriting is very smooth, improved from the 100mg/d.\par Primidone has been partly calming as well.  He sleeps through the night.  He wakes up feeling great, and the morning dose does not make him too sleepy.\par \par \par Background:\par He notes feels a vibration in his arms, ever since grade 1.  It worsened through adolescence, and has stayed the same severity.\par Sometimes it is worse, brought out by anxiety.  Public speaking was a problem, but better now.\par He has noticed that alcohol can improve the symptoms.  He used to be , but no longer has alcohol.\par Feels that his arms are hollow.\par Handwriting can be tricky at times. fluctuates.  But fluctuations are no worse than 10 years ago, for instance.\par \par He does not recall seeing it in his family, perhaps with his mother, but not sure.  Father was 'an alcoholic'.  His brother is autistic and not in touch.\par \par No meds now.\par Allergies: NKDA, but mild hayfever/pollen problems.\par \par Social: teacher now,

## 2021-10-05 ENCOUNTER — APPOINTMENT (OUTPATIENT)
Dept: ORTHOPEDIC SURGERY | Facility: CLINIC | Age: 61
End: 2021-10-05

## 2021-11-02 ENCOUNTER — APPOINTMENT (OUTPATIENT)
Dept: ORTHOPEDIC SURGERY | Facility: CLINIC | Age: 61
End: 2021-11-02
Payer: MEDICAID

## 2021-11-02 ENCOUNTER — OUTPATIENT (OUTPATIENT)
Dept: OUTPATIENT SERVICES | Facility: HOSPITAL | Age: 61
LOS: 1 days | End: 2021-11-02
Payer: COMMERCIAL

## 2021-11-02 ENCOUNTER — RESULT REVIEW (OUTPATIENT)
Age: 61
End: 2021-11-02

## 2021-11-02 DIAGNOSIS — Z98.890 OTHER SPECIFIED POSTPROCEDURAL STATES: Chronic | ICD-10-CM

## 2021-11-02 DIAGNOSIS — Z96.641 PRESENCE OF RIGHT ARTIFICIAL HIP JOINT: Chronic | ICD-10-CM

## 2021-11-02 PROCEDURE — 99214 OFFICE O/P EST MOD 30 MIN: CPT

## 2021-11-02 PROCEDURE — 72110 X-RAY EXAM L-2 SPINE 4/>VWS: CPT | Mod: 26

## 2021-11-02 PROCEDURE — 72110 X-RAY EXAM L-2 SPINE 4/>VWS: CPT

## 2021-11-02 NOTE — PHYSICAL EXAM
[de-identified] : incisions well healed\par exam stable\par  [de-identified] : well fused L5/S1 segment on xray today.  hardware intact.  no sign of adjacent segment disease.

## 2021-11-02 NOTE — DISCUSSION/SUMMARY
[de-identified] : now 1 year post op, well fused.  no pain.\par -may return to full activities\par -follow up in 1 year with new full length scoliosis xrays and lumbar flex ex xrays at that time

## 2021-11-02 NOTE — HISTORY OF PRESENT ILLNESS
[de-identified] : now approx 1 year s/p L5/S1 ALIF and percutaneous pedicle screws L5/S1.  doing well.  no back pain no leg pain. some back stiffness.  now back to work.  exercising.  no numbness/tingling in the legs.

## 2021-12-09 ENCOUNTER — APPOINTMENT (OUTPATIENT)
Dept: ORTHOPEDIC SURGERY | Facility: CLINIC | Age: 61
End: 2021-12-09
Payer: MEDICAID

## 2021-12-09 PROCEDURE — 99214 OFFICE O/P EST MOD 30 MIN: CPT

## 2021-12-09 NOTE — ASSESSMENT
[FreeTextEntry1] : A lengthy discussion was held with the patient regarding her diagnosis of left worse than right carpal tunnel syndrome that has been thus far managed with conservative measures. He has had successful but temporary relief with night splints and injections. I explained that the next step would be definitive management, which includes surgical decompression of the median nerve. I reiterated his double crush syndrome and again explained that definitive treatment of his carpal tunnel syndrome with surgery may not 100% relieve his symptoms of hand numbness and tingling. Surgical risks that were discussed included infection, wound healing issues, hematoma, nerve/tendon/vessel injury, neurapraxia, persistent or worsening numbness/tingling, new electrical-burning pain, weakness, atrophy, hand dysfunction, recurrence of symptoms, post-op pain, CRPS, wound sensitivity, pillar pain, exaggerated scar formation, stiffness, loss of motion, swelling and the potential for re-operation or secondary surgery in the future. The patient was well in accordance with the plan and elected to proceed with surgery. Shared decision making was made and the patient elected to proceed with surgery, first on the left carpal tunnel. He will schedule this at a convenient time in the next several weeks to months. All questions were answered.\par

## 2021-12-09 NOTE — PHYSICAL EXAM
[de-identified] : Negative Tinel's over the carpal tunnel and Guyon canal, bilaterally. Positive Phalen's and carpal tunnel compression test, bilaterally. 5 mm 2-point discrimination in all fingers. Equal, symmetric sensation along the dorsal ulnar sensory branch. Full 5 out of 5 strength to the APB, FDI, and wrist/digital flexors and extensors, including EPL and FPL, bilaterally. \par \par Negative Tinel's over the cubital tunnel bilaterally.\par

## 2021-12-09 NOTE — HISTORY OF PRESENT ILLNESS
[Right] : right hand dominant [FreeTextEntry1] : Mr. Amaya is returning to the office for follow up of bilateral hand numbness. 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 He notes performing a lot of hands-on work (i.e. chopping food, organizing things).

## 2021-12-13 ENCOUNTER — APPOINTMENT (OUTPATIENT)
Dept: DERMATOLOGY | Facility: CLINIC | Age: 61
End: 2021-12-13
Payer: MEDICAID

## 2021-12-13 ENCOUNTER — APPOINTMENT (OUTPATIENT)
Dept: PHYSICAL MEDICINE AND REHAB | Facility: CLINIC | Age: 61
End: 2021-12-13
Payer: MEDICAID

## 2021-12-13 VITALS — WEIGHT: 185 LBS | HEIGHT: 70 IN | BODY MASS INDEX: 26.48 KG/M2

## 2021-12-13 DIAGNOSIS — L30.9 DERMATITIS, UNSPECIFIED: ICD-10-CM

## 2021-12-13 DIAGNOSIS — M77.01 MEDIAL EPICONDYLITIS, RIGHT ELBOW: ICD-10-CM

## 2021-12-13 PROCEDURE — 17110 DESTRUCTION B9 LES UP TO 14: CPT

## 2021-12-13 PROCEDURE — 76942 ECHO GUIDE FOR BIOPSY: CPT | Mod: 26,RT

## 2021-12-13 PROCEDURE — 20551 NJX 1 TENDON ORIGIN/INSJ: CPT | Mod: RT

## 2021-12-13 PROCEDURE — 99214 OFFICE O/P EST MOD 30 MIN: CPT | Mod: 25

## 2021-12-13 RX ORDER — MUPIROCIN 20 MG/G
2 OINTMENT TOPICAL
Qty: 1 | Refills: 3 | Status: ACTIVE | COMMUNITY
Start: 2021-12-13 | End: 1900-01-01

## 2021-12-13 NOTE — PHYSICAL EXAM
[Alert] : alert [Oriented x 3] : ~L oriented x 3 [Well Nourished] : well nourished [Conjunctiva Non-injected] : conjunctiva non-injected [No Visual Lymphadenopathy] : no visual  lymphadenopathy [No Clubbing] : no clubbing [No Edema] : no edema [No Bromhidrosis] : no bromhidrosis [No Chromhidrosis] : no chromhidrosis [Full Body Skin Exam Performed] : performed [FreeTextEntry3] : large WHI on back (both melanomas closed together)\par innumerable brown macules and papules on trunk\par no lad head, neck, axilla, groin\par scaly skin colored plaques hands\par R helix crusted pink papule, no telangiectasia \par

## 2021-12-13 NOTE — HISTORY OF PRESENT ILLNESS
[FreeTextEntry1] : Fu MM [de-identified] : 60 yo M established patient here for above\par hx invasive melanoma on back x 2\par spot on R ear not healing, tender\par no night sweats, weight loss, lad\par 2 benign biopsies done last visit august 2021\par \par works as  consultant - washing hands often

## 2021-12-13 NOTE — HISTORY OF PRESENT ILLNESS
[FreeTextEntry1] : Mr. ORAL ESPINOZA is a very pleasant 60 year male who comes in for follow up evaluation of neck and arm pain. He underwent RIGHT Cervical GABBY on 07/2021 with excellent relief. Today he reports radicular pain and numbness in the LEFT arm, as well as focal Right elbow pain, intermittent and described as sharp. The pain is rated as 0/10 and ranges from 0-7/10. The patient's symptoms are aggravated when sleeping and alleviated by rest. He has been compliant with HEP for his RTC as well which provides some additional relief. The patient denies any weakness, or bowel/bladder dysfunction, or gait imbalance. The patient has no other complaints at this time.\par \par

## 2021-12-13 NOTE — ASSESSMENT
[FreeTextEntry1] : Impression:\par 1. LEFT C7 Radiculopathy\par 2. RIGHT Medial Epicondylosis\par \par Plan: The history, physical examination, and new imaging were reviewed. The imaging and diagnosis were reviewed with the patient along with potential treatment options. The patient is interested in interventional options for symptom reduction; USG CSI was performed in the office today, please see procedure note for full detail. The patient is interested in GABBY for his LEFT arm. We discussed all the risks, benefits, alternative treatments, and prognosis. The patient expressed understanding and would like to move forward. We will schedule for the injection as well as follow up post-procedure. We will plan for LEFT C7-T1 ILESI. The patient was provided with Elbow HEP and [employer] information. The patient expressed verbal understanding and is in agreement with the plan of care. All of the patient's questions and concerns were addressed during today's visit.\par \par

## 2021-12-13 NOTE — PROCEDURE
[de-identified] : Procedure: RIGHT Elbow Common Flexor Tendon Injection with Ultrasound Guidance\par \par Findings: The RIGHT elbow medial epicondyle was examined under ultrasound using a 10mHz linear array transducer. The common flexor tendon was identified and there was evidence of tendinosis with irregularity.\par \par Injectate: 2.5mL consisting of 0.5mL Kenalog (40mg/mL) and 2mL 1% Lidocaine\par \par After risks, benefits and alternatives were discussed and the patient expressed understanding, informed consent was obtained. Risks include but are not limited to bleeding, infection, worsening pain, nerve damage, scar formation. \par \par The medial epicondyle of the RIGHT elbow and common flexor tendon were identified by ultrasound, and probe location was marked on the skin. Ultrasound findings are noted above. The overlying area was prepped and draped in a sterile fashion with Betadine. After skin preparation, a 25 gauge needle was inserted into the common flexor tendon sheath under direct ultrasound guidance, using an in-plane distal to proximal approach. The needle was removed and pressure applied to the area to ensure hemostasis.\par \par Ultrasound images for needle placement verification were permanently stored for reference.\par \par After washing off the betadine a band-aid was used to cover the injection site. There were no complications during or after the procedure. The patient was instructed to apply ice to the area as needed. The patient was instructed to avoid any heavy lifting for 2 weeks. If there are any complications, the patient was instructed to call us. The patient is to follow-up with us in four weeks.\par

## 2021-12-13 NOTE — PHYSICAL EXAM
[FreeTextEntry1] : CERVICAL EXAM\par GEN: AAOx3, NAD.\par GAIT: Non antalgic, Normal reciprocating heel to toe.\par STRENGTH: 5/5 bilateral shoulder abductors, elbow flexors, elbow extensors, wrist extensors, hand intrinsics, long finger flexors to D3 and D5.\par TONE: Normal, No clonus.\par REFLEXES: Aguiar's negative bilaterally.\par SENSATION: Grossly intact to light touch bilateral upper extremities. Sensation is present to LT in hands.\par INSP: Spine alignment is midline, with no evidence of scoliosis.\par CERVICAL ROM: Flexion, extension, side-bending, rotation, oblique extension all full and pain free.  \par SHOULDER ROM: Full/pain free. L-ABd/IR/ER. \par PALP: No TTP midline spinous processes. There is TTP to bilateral paravertebral muscles, trapezius, levator scapulae. shoulder regions are non-painful. (+) TTP R-Medial Epicondyle. \par SPECIAL: Spurling's (+) LT. Axial Compression (-).  Cozen (-) B/L. Reverse Cozen (+) RT.\par

## 2021-12-14 NOTE — PRE-OP CHECKLIST - DENTURES
Attempted to call scheduling department to reschedule MRI for earlier, now that it is ordered stat. Unable to get through. Will try again later.    no

## 2022-01-01 ENCOUNTER — TRANSCRIPTION ENCOUNTER (OUTPATIENT)
Age: 62
End: 2022-01-01

## 2022-01-05 ENCOUNTER — APPOINTMENT (OUTPATIENT)
Dept: PHYSICAL MEDICINE AND REHAB | Facility: CLINIC | Age: 62
End: 2022-01-05
Payer: MEDICAID

## 2022-01-05 DIAGNOSIS — M54.50 LOW BACK PAIN, UNSPECIFIED: ICD-10-CM

## 2022-01-05 PROCEDURE — 99442: CPT

## 2022-01-22 ENCOUNTER — TRANSCRIPTION ENCOUNTER (OUTPATIENT)
Age: 62
End: 2022-01-22

## 2022-01-25 ENCOUNTER — APPOINTMENT (OUTPATIENT)
Dept: PHYSICAL MEDICINE AND REHAB | Facility: CLINIC | Age: 62
End: 2022-01-25
Payer: MEDICAID

## 2022-01-25 DIAGNOSIS — M54.12 RADICULOPATHY, CERVICAL REGION: ICD-10-CM

## 2022-01-25 PROCEDURE — 62321 NJX INTERLAMINAR CRV/THRC: CPT

## 2022-01-27 PROBLEM — M54.12 CERVICAL RADICULOPATHY: Status: ACTIVE | Noted: 2021-04-26

## 2022-02-08 ENCOUNTER — NON-APPOINTMENT (OUTPATIENT)
Age: 62
End: 2022-02-08

## 2022-03-07 ENCOUNTER — APPOINTMENT (OUTPATIENT)
Dept: UROLOGY | Facility: CLINIC | Age: 62
End: 2022-03-07
Payer: MEDICAID

## 2022-03-07 VITALS
BODY MASS INDEX: 26.48 KG/M2 | HEIGHT: 70 IN | SYSTOLIC BLOOD PRESSURE: 118 MMHG | TEMPERATURE: 98.5 F | HEART RATE: 81 BPM | DIASTOLIC BLOOD PRESSURE: 72 MMHG | WEIGHT: 185 LBS

## 2022-03-07 PROCEDURE — 99214 OFFICE O/P EST MOD 30 MIN: CPT

## 2022-03-07 RX ORDER — TADALAFIL 20 MG/1
20 TABLET ORAL
Qty: 12 | Refills: 11 | Status: ACTIVE | COMMUNITY
Start: 2021-03-04 | End: 1900-01-01

## 2022-03-07 NOTE — HISTORY OF PRESENT ILLNESS
[Currently Experiencing ___] :  [unfilled] [Erectile Dysfunction] : Erectile Dysfunction [None] : None [FreeTextEntry1] : 61 yr old with  BPH and Erectile dysfunction\par Returns for yearly follow up \par Reports good erection response to Tadalafil 20 mg \par Able to penetrate\par He reports using Flomax for BPH in the past \par Currently states no voiding problems\par No PSA on file. \par Denies dysuria, hematuria, flank pain \par SHx: 35 pack year smoker, quit 9 yrs ago\par FHX:  Denies prostate and bladder

## 2022-03-07 NOTE — ASSESSMENT
[FreeTextEntry1] :  Erectile dysfunction\par Discussed treatment options\par Continue Tadalafil 20 mg - refilled\par Reviewed medication risks,benefits and alternative medications\par \par BPH \par No symptomatic bother - stable \par Not on medication\par he will get PSA with his  PCP, refuse urogenital exam. \par \par labs: \par UA, UC\par \par Follow up  yearly.

## 2022-03-07 NOTE — PHYSICAL EXAM
[General Appearance - Well Developed] : well developed [General Appearance - Well Nourished] : well nourished [Normal Appearance] : normal appearance [Well Groomed] : well groomed [General Appearance - In No Acute Distress] : no acute distress [Abdomen Soft] : soft [Abdomen Tenderness] : non-tender [Costovertebral Angle Tenderness] : no ~M costovertebral angle tenderness [Urethral Meatus] : meatus normal [Penis Abnormality] : normal circumcised penis [Urinary Bladder Findings] : the bladder was normal on palpation [Scrotum] : the scrotum was normal [Testes Mass (___cm)] : there were no testicular masses [Edema] : no peripheral edema [] : no respiratory distress [Respiration, Rhythm And Depth] : normal respiratory rhythm and effort [Exaggerated Use Of Accessory Muscles For Inspiration] : no accessory muscle use [Oriented To Time, Place, And Person] : oriented to person, place, and time [Affect] : the affect was normal [Mood] : the mood was normal [Not Anxious] : not anxious [Normal Station and Gait] : the gait and station were normal for the patient's age [No Focal Deficits] : no focal deficits [No Palpable Adenopathy] : no palpable adenopathy [FreeTextEntry1] : Refused urogenital exam - he will f/u with PCP

## 2022-03-08 LAB
APPEARANCE: CLEAR
BACTERIA: NEGATIVE
BILIRUBIN URINE: NEGATIVE
BLOOD URINE: NEGATIVE
COLOR: NORMAL
GLUCOSE QUALITATIVE U: NEGATIVE
HYALINE CASTS: 0 /LPF
KETONES URINE: NEGATIVE
LEUKOCYTE ESTERASE URINE: NEGATIVE
MICROSCOPIC-UA: NORMAL
NITRITE URINE: NEGATIVE
PH URINE: 6
PROTEIN URINE: NEGATIVE
RED BLOOD CELLS URINE: 0 /HPF
SPECIFIC GRAVITY URINE: 1.02
SQUAMOUS EPITHELIAL CELLS: 0 /HPF
UROBILINOGEN URINE: NORMAL
WHITE BLOOD CELLS URINE: 0 /HPF

## 2022-03-09 LAB — BACTERIA UR CULT: NORMAL

## 2022-03-11 ENCOUNTER — TRANSCRIPTION ENCOUNTER (OUTPATIENT)
Age: 62
End: 2022-03-11

## 2022-03-11 NOTE — ASU PATIENT PROFILE, ADULT - FALL HARM RISK - UNIVERSAL INTERVENTIONS
Bed in lowest position, wheels locked, appropriate side rails in place/Call bell, personal items and telephone in reach/Instruct patient to call for assistance before getting out of bed or chair/Non-slip footwear when patient is out of bed/[place] to call system/Physically safe environment - no spills, clutter or unnecessary equipment/Purposeful Proactive Rounding/Room/bathroom lighting operational, light cord in reach

## 2022-03-13 ENCOUNTER — TRANSCRIPTION ENCOUNTER (OUTPATIENT)
Age: 62
End: 2022-03-13

## 2022-03-14 ENCOUNTER — APPOINTMENT (OUTPATIENT)
Dept: ORTHOPEDIC SURGERY | Facility: AMBULATORY SURGERY CENTER | Age: 62
End: 2022-03-14

## 2022-03-14 ENCOUNTER — OUTPATIENT (OUTPATIENT)
Dept: OUTPATIENT SERVICES | Facility: HOSPITAL | Age: 62
LOS: 1 days | Discharge: HOME | End: 2022-03-14
Payer: MEDICAID

## 2022-03-14 VITALS
RESPIRATION RATE: 17 BRPM | TEMPERATURE: 97 F | SYSTOLIC BLOOD PRESSURE: 146 MMHG | HEART RATE: 46 BPM | DIASTOLIC BLOOD PRESSURE: 51 MMHG | OXYGEN SATURATION: 100 %

## 2022-03-14 VITALS
RESPIRATION RATE: 16 BRPM | DIASTOLIC BLOOD PRESSURE: 70 MMHG | HEIGHT: 70 IN | OXYGEN SATURATION: 100 % | SYSTOLIC BLOOD PRESSURE: 140 MMHG | TEMPERATURE: 98 F | HEART RATE: 56 BPM | WEIGHT: 183.87 LBS

## 2022-03-14 DIAGNOSIS — G56.02 CARPAL TUNNEL SYNDROME, LEFT UPPER LIMB: ICD-10-CM

## 2022-03-14 DIAGNOSIS — Z96.641 PRESENCE OF RIGHT ARTIFICIAL HIP JOINT: Chronic | ICD-10-CM

## 2022-03-14 DIAGNOSIS — Z98.890 OTHER SPECIFIED POSTPROCEDURAL STATES: Chronic | ICD-10-CM

## 2022-03-14 PROCEDURE — 29848 WRIST ENDOSCOPY/SURGERY: CPT | Mod: RT

## 2022-03-14 RX ORDER — ONDANSETRON 8 MG/1
4 TABLET, FILM COATED ORAL ONCE
Refills: 0 | Status: DISCONTINUED | OUTPATIENT
Start: 2022-03-14 | End: 2022-03-14

## 2022-03-14 RX ORDER — SODIUM CHLORIDE 9 MG/ML
1000 INJECTION, SOLUTION INTRAVENOUS
Refills: 0 | Status: DISCONTINUED | OUTPATIENT
Start: 2022-03-14 | End: 2022-03-14

## 2022-03-14 RX ORDER — ACETAMINOPHEN 500 MG
650 TABLET ORAL ONCE
Refills: 0 | Status: COMPLETED | OUTPATIENT
Start: 2022-03-14 | End: 2022-03-14

## 2022-03-14 RX ORDER — ACETAMINOPHEN 500 MG
650 TABLET ORAL EVERY 6 HOURS
Refills: 0 | Status: DISCONTINUED | OUTPATIENT
Start: 2022-03-14 | End: 2022-03-14

## 2022-03-14 RX ADMIN — Medication 650 MILLIGRAM(S): at 09:31

## 2022-03-14 NOTE — ASU DISCHARGE PLAN (ADULT/PEDIATRIC) - NS MD DC FALL RISK RISK
For information on Fall & Injury Prevention, visit: [email or web address] OR  [email or web address] OR  https://www.cdc.gov/steadi/patient.html

## 2022-03-14 NOTE — ASU DISCHARGE PLAN (ADULT/PEDIATRIC) - ASU DC SPECIAL INSTRUCTIONSFT
Hand elevation using gray foam pillow  Digital ROM  Nonweightbearing Operative extremity  Keep dressing/spint clean and dry

## 2022-03-14 NOTE — ASU DISCHARGE PLAN (ADULT/PEDIATRIC) - CARE PROVIDER_API CALL
Chris Russo)  [facility]  [address], [address] [place] [address]  Phone: [phone]  Fax: [phone]  Follow Up Time:

## 2022-03-18 ENCOUNTER — APPOINTMENT (OUTPATIENT)
Dept: PHYSICAL MEDICINE AND REHAB | Facility: CLINIC | Age: 62
End: 2022-03-18
Payer: MEDICAID

## 2022-03-18 VITALS
SYSTOLIC BLOOD PRESSURE: 145 MMHG | HEIGHT: 70 IN | BODY MASS INDEX: 26.48 KG/M2 | OXYGEN SATURATION: 98 % | DIASTOLIC BLOOD PRESSURE: 87 MMHG | HEART RATE: 68 BPM | WEIGHT: 185 LBS

## 2022-03-18 DIAGNOSIS — M77.12 LATERAL EPICONDYLITIS, LEFT ELBOW: ICD-10-CM

## 2022-03-18 PROCEDURE — 76942 ECHO GUIDE FOR BIOPSY: CPT | Mod: LT,59

## 2022-03-18 PROCEDURE — 20550 NJX 1 TENDON SHEATH/LIGAMENT: CPT | Mod: LT

## 2022-03-18 PROCEDURE — 99213 OFFICE O/P EST LOW 20 MIN: CPT | Mod: 25

## 2022-03-18 NOTE — ASSESSMENT
[FreeTextEntry1] : Impression:\par 1. LEFT C7 Radiculopathy\par 2. RIGHT Medial Epicondylosis\par 3. LEFT Lateral Epicondylosis\par \par Plan: The history, physical examination, and new imaging were reviewed. The imaging and diagnosis were reviewed with the patient along with potential treatment options. The patient is interested in interventional options for symptom reduction; USG CSI was performed in the office today, please see procedure note for full detail. Based on his previous experience with CSI for the RIGHT elbow we discussed more definitive long term treatment with PRP. He was provided with educational handout and we reviewed the post-procedure protocol. He will review the material and we will revisit the discussion in a few months. The patient expressed verbal understanding and is in agreement with the plan of care. All of the patient's questions and concerns were addressed during today's visit.

## 2022-03-18 NOTE — PHYSICAL EXAM
[FreeTextEntry1] : CERVICAL EXAM\par GEN: AAOx3, NAD.\par GAIT: Non antalgic, Normal reciprocating heel to toe.\par STRENGTH: 5/5 bilateral shoulder abductors, elbow flexors, elbow extensors, wrist extensors, hand intrinsics, long finger flexors to D3 and D5.\par TONE: Normal, No clonus.\par REFLEXES: Aguiar's negative bilaterally.\par SENSATION: Grossly intact to light touch bilateral upper extremities. Sensation is present to LT in hands.\par INSP: Spine alignment is midline, with no evidence of scoliosis.\par CERVICAL ROM: Flexion, extension, side-bending, rotation, oblique extension all full and pain free.  \par SHOULDER ROM: Full/pain free. L-ABd/IR/ER. \par PALP: No TTP midline spinous processes. There is TTP to bilateral paravertebral muscles, trapezius, levator scapulae. shoulder regions are non-painful. (+) TTP R-Medial Epicondyle/L-Lateral Epicondyle. \par SPECIAL: Spurling's (+) LT. Axial Compression (-).  Cozen (+) RIGHT. Reverse Cozen (+) LEFT.\par

## 2022-03-18 NOTE — HISTORY OF PRESENT ILLNESS
[FreeTextEntry1] : Mr. ORAL ESPINOZA is a very pleasant 60 year male who comes in for follow up of bilateral elbow pain. He underwent RIGHT Medial Epicondyle CSI at his last visit which he feels helped for 1-2 months, but then pain returned after performing work duties which consist of very heavy repetitive lifting. He attempted to perform PT/HEP however was too painful. Today he reports both the Right and Left elbows are in discomfort, intermittent and described as sharp. He recently underwent CT Release with Dr. Russo and the forced rest has made his symptoms better. The pain is rated as 3/10 and ranges from 1-7/10. The patient's symptoms are aggravated with any activity of the arms, and alleviated by rest and Voltaren gel. The patient denies any weakness, or bowel/bladder dysfunction, or gait imbalance. The patient has no other complaints at this time.\par \par

## 2022-03-18 NOTE — PROCEDURE
[de-identified] : Procedure: LEFT Elbow Common Extensor Tendon Injection with Ultrasound Guidance\par \par Findings: The LEFT elbow lateral epicondyle was examined under ultrasound using a 10mHz linear array transducer. The common extensor tendon was identified and there was evidence of tendinosis with irregularity.\par \par Injectate: 2mL consisting of 0.5mL Kenalog (40mg/mL) and 1.5mL 1% Lidocaine\par \par After risks, benefits and alternatives were discussed and the patient expressed understanding, informed consent was obtained. Risks include but are not limited to bleeding, infection, worsening pain, nerve damage, scar formation. \par \par The medial epicondyle of the LEFT elbow and common extensor tendon were identified by ultrasound, and probe location was marked on the skin. Ultrasound findings are noted above. The overlying area was prepped and draped in a sterile fashion with Betadine. After skin preparation, a 25 gauge needle was inserted into the common flexor tendon sheath under direct ultrasound guidance, using an in-plane distal to proximal approach. The needle was removed and pressure applied to the area to ensure hemostasis.\par \par Ultrasound images for needle placement verification were permanently stored for reference.\par \par After washing off the betadine a band-aid was used to cover the injection site. There were no complications during or after the procedure. The patient was instructed to apply ice to the area as needed. The patient was instructed to avoid any heavy lifting for 2 weeks. If there are any complications, the patient was instructed to call us. The patient is to follow-up with us in four weeks.\par

## 2022-03-21 ENCOUNTER — APPOINTMENT (OUTPATIENT)
Dept: DERMATOLOGY | Facility: CLINIC | Age: 62
End: 2022-03-21
Payer: MEDICAID

## 2022-03-21 DIAGNOSIS — H61.001 UNSPECIFIED PERICHONDRITIS OF RIGHT EXTERNAL EAR: ICD-10-CM

## 2022-03-21 PROCEDURE — 99213 OFFICE O/P EST LOW 20 MIN: CPT | Mod: 25

## 2022-03-21 PROCEDURE — 17110 DESTRUCTION B9 LES UP TO 14: CPT

## 2022-03-21 NOTE — HISTORY OF PRESENT ILLNESS
[FreeTextEntry1] : Fu MM [de-identified] : 62 yo M established patient here for above\par hx invasive melanoma on back x 2\par spot on R ear scabby again, LN helped temporarily last visit \par no night sweats, weight loss, lad, bone or belly pain\par

## 2022-03-21 NOTE — PHYSICAL EXAM
[Alert] : alert [Oriented x 3] : ~L oriented x 3 [Well Nourished] : well nourished [Conjunctiva Non-injected] : conjunctiva non-injected [No Visual Lymphadenopathy] : no visual  lymphadenopathy [No Clubbing] : no clubbing [No Edema] : no edema [No Bromhidrosis] : no bromhidrosis [No Chromhidrosis] : no chromhidrosis [Full Body Skin Exam Performed] : performed [FreeTextEntry3] : large WHI on back (both melanomas closed together)\par innumerable brown macules and papules on trunk\par no lad head, neck, axilla, groin\par R helix thin hyperkeratotic skin colored papule\par

## 2022-03-24 ENCOUNTER — APPOINTMENT (OUTPATIENT)
Dept: ORTHOPEDIC SURGERY | Facility: CLINIC | Age: 62
End: 2022-03-24
Payer: MEDICAID

## 2022-03-24 DIAGNOSIS — G56.03 CARPAL TUNNEL SYNDROM,BILATERAL UPPER LIMBS: ICD-10-CM

## 2022-03-24 PROCEDURE — 99024 POSTOP FOLLOW-UP VISIT: CPT

## 2022-04-19 ENCOUNTER — APPOINTMENT (OUTPATIENT)
Dept: ORTHOPEDIC SURGERY | Facility: CLINIC | Age: 62
End: 2022-04-19
Payer: MEDICAID

## 2022-04-19 PROCEDURE — 99024 POSTOP FOLLOW-UP VISIT: CPT

## 2022-04-26 ENCOUNTER — RX RENEWAL (OUTPATIENT)
Age: 62
End: 2022-04-26

## 2022-06-05 NOTE — PHYSICAL EXAM
[FreeTextEntry1] : GEN:  NAD, AAOx3.\par PSYCH:  Normal mood and affect. Responds appropriately to commands.\par INSP: Spine alignment is midline, with no evidence of scoliosis.\par STANCE: No Trendelenburg with single leg stance.\par GAIT: Non antalgic, normal reciprocating heel to toe\par LUMBAR AROM: Flexion, extension, side-bending, rotation full and pain free.  \par HIP AROM: Full and pain free \par  tachycardia   fever

## 2022-06-27 ENCOUNTER — APPOINTMENT (OUTPATIENT)
Dept: DERMATOLOGY | Facility: CLINIC | Age: 62
End: 2022-06-27

## 2022-06-27 ENCOUNTER — APPOINTMENT (OUTPATIENT)
Dept: ORTHOPEDIC SURGERY | Facility: CLINIC | Age: 62
End: 2022-06-27

## 2022-06-27 DIAGNOSIS — Z98.1 ARTHRODESIS STATUS: ICD-10-CM

## 2022-06-27 DIAGNOSIS — L73.8 OTHER SPECIFIED FOLLICULAR DISORDERS: ICD-10-CM

## 2022-06-27 PROCEDURE — 72083 X-RAY EXAM ENTIRE SPI 4/5 VW: CPT

## 2022-06-27 PROCEDURE — 99214 OFFICE O/P EST MOD 30 MIN: CPT

## 2022-06-27 PROCEDURE — 99213 OFFICE O/P EST LOW 20 MIN: CPT

## 2022-06-27 NOTE — HISTORY OF PRESENT ILLNESS
[FreeTextEntry1] : Fu MM [de-identified] : 61 yo M established patient here for above\par hx invasive melanoma on back x 2\par no night sweats, weight loss, lad, bone or belly pain\par CNH-switched to softer pillow\par going to yoga retreat [employer] end of August, cooks and swims, goes every year\par

## 2022-06-27 NOTE — PHYSICAL EXAM
[Alert] : alert [Oriented x 3] : ~L oriented x 3 [Well Nourished] : well nourished [Conjunctiva Non-injected] : conjunctiva non-injected [No Visual Lymphadenopathy] : no visual  lymphadenopathy [No Clubbing] : no clubbing [No Edema] : no edema [No Bromhidrosis] : no bromhidrosis [No Chromhidrosis] : no chromhidrosis [Full Body Skin Exam Performed] : performed [FreeTextEntry3] : large WHI on back (both melanomas closed together)\par WHI with lentigines L upper back (cyst)\par innumerable brown macules and papules on trunk\par no lad head, neck, axilla, groin\par yellow lobular papule nose\par

## 2022-07-18 PROBLEM — Z98.1 S/P LUMBAR FUSION: Status: ACTIVE | Noted: 2020-11-23

## 2022-07-18 NOTE — ADDENDUM
[FreeTextEntry1] : I, Danita Bardales, assisted with documentation on 06/27/2022 acting as scribe for Dr. Dannie Langston.

## 2022-07-18 NOTE — DISCUSSION/SUMMARY
[de-identified] : Diagnosis: low back pain, status post L5-S1 fusion.\par \par Patient was referred for physical therapy. He was also given a prescription for an anti-inflammatory. Instructions were given. Patient will follow up in 6 weeks if not improved, at which point we will get an MRI Lumbar Spine with metal reduction artifact protocol before his visit.

## 2022-07-18 NOTE — PHYSICAL EXAM
[de-identified] : General: patient is well developed, well nourished, in no acute \par distress, alert and oriented x 3. \par \par Mood and affect: normal\par \par Respiratory: no respiratory distress noted\par \par Skin: well healed incisions, skin intact, no erythema, increased warmth\par \par Alignment:The spine is well compensated in the coronal and sagittal plane. \par \par Gait: The patient is able to toe walk and heel walk without difficulty. \par \par Palpation: no tenderness to palpation spine or paraspinal region\par \par Range of motion: Lumbar spine ROM is full\par \par Neurologic Exam:\par Motor: Manual Muscle testing in the lower extremities is 5 out of 5 in all muscle groups. There is no evidence of muscular atrophy in the lower extremities. Sensory: Sensation to light touch is grossly intact in the lower extremities\par \par Hip Exam: No pain with internal or external rotation of hips bilaterally\par \par Special tests: Straight leg raise test negative. Cross straight leg test negative.  [de-identified] : XR Full Spine AP/Lateral, Lumbar flexion/extension 6/27/22 [my read]: shows L5-S1 anterior/posterior fusion with instrumentation intact. There is a well healed anterior fusion. No new disc degeneration. No instability. No fractures.\par \par well fused L5/S1 segment on xray today.  hardware intact.  no sign of adjacent segment disease.

## 2022-07-28 ENCOUNTER — APPOINTMENT (OUTPATIENT)
Dept: ORTHOPEDIC SURGERY | Facility: CLINIC | Age: 62
End: 2022-07-28

## 2022-07-28 PROCEDURE — 99213 OFFICE O/P EST LOW 20 MIN: CPT | Mod: 25

## 2022-07-28 PROCEDURE — 20550 NJX 1 TENDON SHEATH/LIGAMENT: CPT | Mod: F5

## 2022-11-08 ENCOUNTER — RESULT REVIEW (OUTPATIENT)
Age: 62
End: 2022-11-08

## 2022-11-08 ENCOUNTER — APPOINTMENT (OUTPATIENT)
Dept: ORTHOPEDIC SURGERY | Facility: CLINIC | Age: 62
End: 2022-11-08

## 2022-11-08 ENCOUNTER — OUTPATIENT (OUTPATIENT)
Dept: OUTPATIENT SERVICES | Facility: HOSPITAL | Age: 62
LOS: 1 days | End: 2022-11-08
Payer: COMMERCIAL

## 2022-11-08 VITALS
HEART RATE: 59 BPM | WEIGHT: 185 LBS | OXYGEN SATURATION: 100 % | BODY MASS INDEX: 26.48 KG/M2 | DIASTOLIC BLOOD PRESSURE: 80 MMHG | HEIGHT: 70 IN | SYSTOLIC BLOOD PRESSURE: 138 MMHG

## 2022-11-08 DIAGNOSIS — Z98.890 OTHER SPECIFIED POSTPROCEDURAL STATES: Chronic | ICD-10-CM

## 2022-11-08 DIAGNOSIS — Z96.641 PRESENCE OF RIGHT ARTIFICIAL HIP JOINT: Chronic | ICD-10-CM

## 2022-11-08 DIAGNOSIS — M16.11 UNILATERAL PRIMARY OSTEOARTHRITIS, RIGHT HIP: ICD-10-CM

## 2022-11-08 DIAGNOSIS — M87.051 IDIOPATHIC ASEPTIC NECROSIS OF RIGHT FEMUR: ICD-10-CM

## 2022-11-08 PROCEDURE — 99214 OFFICE O/P EST MOD 30 MIN: CPT

## 2022-11-08 PROCEDURE — 73502 X-RAY EXAM HIP UNI 2-3 VIEWS: CPT

## 2022-11-08 PROCEDURE — 73502 X-RAY EXAM HIP UNI 2-3 VIEWS: CPT | Mod: 26,LT

## 2022-11-09 NOTE — END OF VISIT
[FreeTextEntry3] : All medical record entries made by the Scribe were at my, Dr. Diallo Hwang, direction and personally dictated by me on 11/08/2022. I have reviewed the chart and agree that the record accurately reflects my personal performance of the history, physical exam, assessment and plan. I have also personally directed, reviewed, and agreed with the chart.
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
## 2022-11-09 NOTE — DISCUSSION/SUMMARY
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 Will obtain an MRI non-contrast of the left hip to rule out osteonecrosis and will call him back with the results and any further recommendations. If pre-collapse ON is found, then likely proceed with core decompression + BMAC\par - He will also be provided with a Rx for PT and a HEP focused on the hip, but I instructed him not to begin with those until after the MRI has been taken.

## 2022-11-09 NOTE — HISTORY OF PRESENT ILLNESS
[3] : a current pain level of 3/10 [Constant] : ~He/She~ states the symptoms seem to be constant [Walking] : worsened by walking [Rest] : relieved by rest [de-identified] : R EDUAR 7/31/20\par \par 11/08/2022: 61 y/o male f/u now for new complaint of left hip pain. He states that the symptoms have been present for several months now without history of injury or other inciting event. He localizes the pain to the anterior aspect of the left hip, exacerbated by certain provocative motions, weightbearing, and ambulation. He feels that the symptoms are similar to the symptoms that preceded his R EDUAR. He has not been using any analgesics or anti-inflammatories. He is continuing with his HEP including cycling and elliptical. He also reports sudden rare episodes of left leg buckling in the last few months. He is nervous about possible osteonecrosis and would like to investigate further.\par \par 11/25/20: 61y/o male following up s/p R EDUAR 7/31/20. Underwent L5/S1 ALIF/PSF by Wilma Langston and Naima earlier this month; reports everything went well and is very happy with the outcome thus far. The hip is doing well. He can forget about it most days. He does HEP consisting of ambulation and some yoga. No complaints today.  [de-identified] : pt states pain is dull

## 2022-11-14 ENCOUNTER — APPOINTMENT (OUTPATIENT)
Dept: DERMATOLOGY | Facility: CLINIC | Age: 62
End: 2022-11-14

## 2022-12-12 NOTE — DISCHARGE NOTE PROVIDER - NSDCQMSTROKE_NEU_ALL_CORE
Subjective


Progress Note Date: 12/12/22


Principal diagnosis: 





Right diabetic foot infection


Acute exacerbation CHF


NSTEMI


Metastatic lung cancer





66-year-old gentleman with past medical history significant for lung cancer on 

immunotherapy, congestive heart Failure, COPD, asthma who presented to the ER 

because of worsening shortness of breath and swelling of legs.  Patient has been

noticing increasing shortness of breath on exertion for the last couple of days 

associated with increased swelling of legs.  Patient was on diuretics at home 

but stated that it was not working and he continues to be short of breath.  

Patient also currently on antibiotics for right foot wound.  Because of this w

orsening shortness of breath and swelling of legs patient came to the ER.  

Initial lab work showed patient to have a white count of 12.9, hemoglobin of 

8.8, platelet count of 628, sodium 137, potassium 4.3, carbon dioxide 38, 

troponin 0.1.  CTA chest done showed no evidence of pulmonary embolism.  It did 

show pleural thickening and destructive changes in the left upper ribs.  Large 

mass involving the upper limits of chest wall was also seen.  Patient was 

admitted to hospitalist service for further evaluation and treatment





12/10/2022


Patient is seen and evaluated with family at bedside; denies any fever or 

chills, the patient is breathing slightly comfortably on a 2 L nasal cannula, 

the patient denies having any chest pain occasional cough no nausea no vomiting 

no abdominal pain and denies having any pain to the right foot plantar wound 

area


patient with right diabetic foot wound with secondary infection cultures are 

growing MSSA and anaerobes patient did have a wound with slough tissue and 

concern for underlying deep infection.


patient is status post vascular surgery evaluation and debridement of the right

foot plantar wound along with deep culture which are currently growing staph 

aureus


patient to continue with Unasyn 3 g per 6-hour while inpatient and hopefully 

transition to oral antibodies on discharge





12/11/2022


Patient is seen and evaluated; breathing slightly comfortably on a 2 L nasal 

cannula, the patient denies  chest pain, the patient did have occasional cough 

no nausea no vomiting no abdominal pain and the patient denies having any pain 

to the right foot plantar wound area


Labs are reviewed and stable with WBC of 2.0, hemoglobin 8.7 and hematocrit of 

31.5 with platelet count of 471, sodium 133, potassium 4.3, BUN/creatinine of 

18/0.48 and blood was of 140


patient with right diabetic foot wound with secondary infection cultures are 

growing MSSA and anaerobes patient did have a wound with slough tissue and 

concern for underlying deep infection.


patient is status post vascular surgery evaluation and debridement of the right

foot plantar wound along with deep culture growing MSSA and gram-negative with 

medicines due to spending


patient seemed to have shown some clinical improvement as for his right foot 

wound is concerned and will continue with Unasyn 3 g per 6-hour adjusting it 

further on the basis of culture








12/12/2022


Patient is evaluated today sitting up at the bedside with family.  He is 

comfortable on 2 L of nasal cannula, he denies chest pain.  He has occasional 

cough.  He does report a bowel movement for the last 7 days.  He is receiving 

Colace and Senokot was added as well as a Dulcolax suppository 1 today.  He 

does have soft abdomen with normoactive bowel sounds.  White count 11.5 today, 

sodium 134.  Continues on IV Unasyn with further recommendations for DC from 

infectious disease. Continues on IV solumedrol, oral lasix. Pain management with

MS contin. Lower extremity edema has improved.





Review of Systems





Constitutional: Denied any fatigue denied any fever.


Cardio vascular: denied any chest pain, palpitations


Gastrointestinal: denied any nausea, vomiting, diarrhea


Pulmonary: Denied any shortness of breath Reports cough


Neurologic denied any new focal deficits





All inpatient medications were reviewed and appropriate changes in these 

medications as dictated in the interval history and assessment and plan.





PHYSICAL EXAMINATION: 





GENERAL: The patient is alert and oriented x3, not in any acute distress. on 2 L

nasal cannula. Well developed, well nourished. 


HEENT: Pupils are round and equally reacting to light. EOMI. No scleral icterus.

No conjunctival pallor. Normocephalic, atraumatic. No pharyngeal erythema. No 

thyromegaly. 


CARDIOVASCULAR: S1 and S2 present. No murmurs, rubs, or gallops. 


PULMONARY: Chest is clear to auscultation, no wheezing or crackles. 


ABDOMEN: Soft, nontender, nondistended, normoactive bowel sounds. No palpable 

organomegaly. 


MUSCULOSKELETAL: No joint swelling or deformity.


EXTREMITIES: No cyanosis, clubbing, or pedal edema. Dressing intact to right 

foot stump.


NEUROLOGICAL: Gross neurological examination did not reveal any focal deficits. 


SKIN: No rashes. 





Assessment plan


Assessment


-Right diabetic foot wound status post debridement and deep tissue culture 


Acute on chronic hypoxic respiratory failure related to a combination of 

diastolic heart failure and COPD exacerbation


Acute onset congestive heart failure diastolic dysfunction, improved


Acute COPD exacerbation


Moderate aortic stenosis 


Lung mass with invasion to chest wall destruction involving the ribs


Chronic left shoulder pain and left upper extremity lymphedema 


Metastatic stage IV lung cancer


Diabetes Mellitus


Hypertension 


GI prophylaxis


DVT prophylaxis SubCu heparin 


Full Code 





Plan 


Continue on oral Lasix with strict intake and output


Monitor weights


Continue antibiotics in the form of IV unasyn, microsensitivities are available 

further recommendations from ID for discharge antibiotics


Continue oxygen via nasal cannula 2 to 3 liters 


Pain management 


Continue bowel regimen. 


Continue local wound care with santyl cream daily per vascular services to right

foot wound 


D/C to [place] tomorrow 





The impression and plan of care has been dictated by Annamaria Pratt, Nurse 

Practitioner as directed.





Dr. Damaris MD


I have performed a history and physical examination and medical decision making 

of this patient, discussed the same with the dictator, and agree with the 

dictators assessment and plan as written, documented as a scribe. Based on total

visit time, I have performed more than 50% of this visit. 








Objective





- Vital Signs


Vital signs: 


                                   Vital Signs











Temp  98.2 F   12/12/22 04:00


 


Pulse  89   12/12/22 12:00


 


Resp  16   12/12/22 12:00


 


BP  112/64   12/12/22 12:00


 


Pulse Ox  92 L  12/12/22 12:00


 


FiO2      








                                 Intake & Output











 12/11/22 12/12/22 12/12/22





 18:59 06:59 18:59


 


Intake Total 118  360


 


Output Total 500 300 225


 


Balance -382 -300 135


 


Weight  103.5 kg 


 


Intake:   


 


  Oral 118  360


 


Output:   


 


  Urine 500 300 225


 


Other:   


 


  Voiding Method Urinal Urinal Urinal





 Diaper Diaper Diaper














- Labs


CBC & Chem 7: 


                                 12/12/22 07:29





                                 12/12/22 07:29


Labs: 


                  Abnormal Lab Results - Last 24 Hours (Table)











  12/11/22 12/11/22 12/12/22 Range/Units





  16:34 19:58 06:00 


 


WBC     (3.8-10.6)  k/uL


 


RBC     (4.30-5.90)  m/uL


 


Hgb     (13.0-17.5)  gm/dL


 


Hct     (39.0-53.0)  %


 


MCH     (25.0-35.0)  pg


 


MCHC     (31.0-37.0)  g/dL


 


RDW     (11.5-15.5)  %


 


Neutrophils #     (1.3-7.7)  k/uL


 


Lymphocytes #     (1.0-4.8)  k/uL


 


Sodium     (137-145)  mmol/L


 


Chloride     ()  mmol/L


 


Carbon Dioxide     (22-30)  mmol/L


 


Creatinine     (0.66-1.25)  mg/dL


 


Glucose     (74-99)  mg/dL


 


POC Glucose (mg/dL)  197 H  133 H  146 H  ()  mg/dL


 


Calcium     (8.4-10.2)  mg/dL














  12/12/22 12/12/22 12/12/22 Range/Units





  07:29 07:29 11:26 


 


WBC  11.5 H    (3.8-10.6)  k/uL


 


RBC  3.81 L    (4.30-5.90)  m/uL


 


Hgb  9.1 L    (13.0-17.5)  gm/dL


 


Hct  32.1 L    (39.0-53.0)  %


 


MCH  23.8 L    (25.0-35.0)  pg


 


MCHC  28.2 L    (31.0-37.0)  g/dL


 


RDW  16.4 H    (11.5-15.5)  %


 


Neutrophils #  9.9 H    (1.3-7.7)  k/uL


 


Lymphocytes #  0.9 L    (1.0-4.8)  k/uL


 


Sodium   134 L   (137-145)  mmol/L


 


Chloride   90 L   ()  mmol/L


 


Carbon Dioxide   42 H*   (22-30)  mmol/L


 


Creatinine   0.51 L   (0.66-1.25)  mg/dL


 


Glucose   139 H   (74-99)  mg/dL


 


POC Glucose (mg/dL)    207 H  ()  mg/dL


 


Calcium   8.0 L   (8.4-10.2)  mg/dL








                      Microbiology - Last 24 Hours (Table)











 12/08/22 17:57 Gram Stain - Final





 Foot - Right Tissue Culture - Final





    Staphylococcus aureus





    Enterobacter cloacae














Assessment and Plan


Time with Patient: Less than 30 No

## 2022-12-13 ENCOUNTER — APPOINTMENT (OUTPATIENT)
Dept: DERMATOLOGY | Facility: CLINIC | Age: 62
End: 2022-12-13

## 2022-12-13 DIAGNOSIS — D48.5 NEOPLASM OF UNCERTAIN BEHAVIOR OF SKIN: ICD-10-CM

## 2022-12-13 PROCEDURE — 99213 OFFICE O/P EST LOW 20 MIN: CPT

## 2022-12-14 PROBLEM — D48.5 NEOPLASM OF UNCERTAIN BEHAVIOR OF SKIN: Status: ACTIVE | Noted: 2020-09-21

## 2022-12-14 NOTE — HISTORY OF PRESENT ILLNESS
[FreeTextEntry1] : MM - RPA [de-identified] : PCP: ESTUARDO GIPSON\par \par ORAL ESPINOZA is a 62 year M who presents for evaluation of:\par \par Hx of T1a melanoma x2 on the back s/p WLE (closed as single linear lesion).\par Here today for mole check. Would like check of lesion on L shoulder. Last exam 6/2022.\par Denies night sweats, HA, weight loss, abdominal pain.\par \par Melanoma hx:\par left mid paraspinal 0.4 mm s/p WLE 2020\par right mid paraspinal 0.2 mm s/p WLE 2020

## 2022-12-14 NOTE — PHYSICAL EXAM
[Alert] : alert [Oriented x 3] : ~L oriented x 3 [Full Body Skin Exam Performed] : performed [FreeTextEntry3] : Numerous brown macules and papules on face, trunk, extremities; appear symmetric and uniform without ABCD features.\par Firm subcutaneous papule on L posterior shoulder.\par Well healed large linear scar on back. No nodularity or pigmentation.\par No head and neck and axillary LAD.

## 2022-12-14 NOTE — ASSESSMENT
[FreeTextEntry1] : 1. Hx of multiple invasive melanoma\par - Scar is LE\par - No LAD of draining lymph node basins\par - ROS negative\par \par 2. Multiple nevi\par 3. Lentigines\par - Benign appearing on today's examination\par - Montior for change\par \par 4. NUBS of skin - L posterior shoulder\par - Favor cyst but would recommend bx/excision given MM hx\par - Return in Jan for small punch excision\par \par 5. Skin cancer screening\par - A complete skin exam was performed\par - No other concerning lesions identified\par - Photoprotection was discussed including sunscreen and sun protective clothing\par - ABCDEs of melanoma reviewed\par - Call for new or changing lesions\par - Info given for [facility]\par \par RTC Jan for excision, 6 months CBE\par

## 2022-12-20 ENCOUNTER — APPOINTMENT (OUTPATIENT)
Dept: ORTHOPEDIC SURGERY | Facility: CLINIC | Age: 62
End: 2022-12-20

## 2022-12-20 DIAGNOSIS — R20.0 ANESTHESIA OF SKIN: ICD-10-CM

## 2022-12-20 PROCEDURE — 99213 OFFICE O/P EST LOW 20 MIN: CPT | Mod: 25

## 2022-12-20 PROCEDURE — 20550 NJX 1 TENDON SHEATH/LIGAMENT: CPT

## 2023-01-04 ENCOUNTER — APPOINTMENT (OUTPATIENT)
Dept: ORTHOPEDIC SURGERY | Facility: CLINIC | Age: 63
End: 2023-01-04
Payer: MEDICAID

## 2023-01-04 VITALS
WEIGHT: 180 LBS | OXYGEN SATURATION: 98 % | BODY MASS INDEX: 25.77 KG/M2 | HEIGHT: 70 IN | DIASTOLIC BLOOD PRESSURE: 73 MMHG | SYSTOLIC BLOOD PRESSURE: 117 MMHG | HEART RATE: 73 BPM

## 2023-01-04 PROCEDURE — 99213 OFFICE O/P EST LOW 20 MIN: CPT

## 2023-01-09 ENCOUNTER — NON-APPOINTMENT (OUTPATIENT)
Age: 63
End: 2023-01-09

## 2023-01-09 NOTE — END OF VISIT
[FreeTextEntry3] : All medical record entries made by the Scribe were at my, Dr. Diallo Hwang, direction and personally dictated by me on 01/04/2023. I have reviewed the chart and agree that the record accurately reflects my personal performance of the history, physical exam, assessment and plan. I have also personally directed, reviewed, and agreed with the chart.
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
## 2023-01-09 NOTE — PHYSICAL EXAM
[de-identified] : General appearance: well nourished and hydrated, pleasant, alert and oriented x 3, cooperative. \par HEENT: normocephalic, EOM intact, wearing mask, external auditory canal clear.  \par Cardiovascular: no lower leg edema, no varicosities, dorsalis pedis pulses palpable and symmetric.  \par Lymphatics: no palpable lymphadenopathy, no lymphedema.  \par Neurologic: sensation is normal, no muscle weakness in upper or lower extremities, patella tendon reflexes present and symmetric.  \par Dermatologic: skin moist, warm, no rash.  \par Spine: cervical spine with normal lordosis and painless range of motion, thoracic spine with normal kyphosis and painless range of motion, lumbosacral spine with normal lordosis and painless range of motion.  No tenderness to palpation along midline spine and paraspinal musculature.  Sacroiliac joints nontender bilaterally. Negative SLR and crossed SLR tests bilaterally.\par Gait: normal.  No assistive device, stable and nonantalgic gait pattern\par \par Limb lengths: clinically equal\par \par Left hip:\par - Focal soft tissue swelling: none\par - Ecchymosis: none\par - Erythema: none\par - Wounds: none\par - Tenderness: mild anterior TTP\par - ROM: \par   - Flexion: 135\par   - Extension: 0\par   - Adduction: 15\par   - Abduction: 45\par   - Internal rotation in 90 degrees of hip flexion: 20\par   - External rotation in 90 degrees of hip flexion: 40\par - ANAM: negative\par - FADIR: negative\par - Maria Luisa: negative\par - Stinchfield: negative\par - Flexor power: 5/5\par - Abductor power: 5/5\par \par Right hip: \par - Focal soft tissue swelling: none\par - Ecchymosis: none\par - Erythema: none\par - Wounds: well-healed surgical incision, benign appearing\par - Tenderness: none\par - ROM: \par   - Flexion: 130\par   - Extension: 0\par   - Adduction: 15\par   - Abduction: 50\par   - Internal rotation in 90 degrees of hip flexion: 30\par   - External rotation in 90 degrees of hip flexion: 50\par - ANAM: negative\par - FADIR: negative\par - Maria Luisa: negative\par - Stinchfield: negative\par - Flexor power: 5/5\par - Abductor power: 5/5

## 2023-01-31 ENCOUNTER — APPOINTMENT (OUTPATIENT)
Dept: DERMATOLOGY | Facility: CLINIC | Age: 63
End: 2023-01-31

## 2023-02-03 ENCOUNTER — RX RENEWAL (OUTPATIENT)
Age: 63
End: 2023-02-03

## 2023-02-23 ENCOUNTER — NON-APPOINTMENT (OUTPATIENT)
Age: 63
End: 2023-02-23

## 2023-02-24 NOTE — ASU PATIENT PROFILE, ADULT - HOW PATIENT ADDRESSED, PROFILE
Braxton For information on Fall & Injury Prevention, visit: [email or web address] OR  [email or web address] OR  https://www.cdc.gov/steadi/patient.html

## 2023-02-24 NOTE — ASU PATIENT PROFILE, ADULT - NSSUBSTANCEUSE_GEN_ALL_CORE_SD
Progress Notes by Eva Segal at 12/19/17 11:39 AM     Author:  Eva Segal Service:  (none) Author Type:       Filed:  12/19/17 11:39 AM Encounter Date:  12/19/2017 Status:  Signed     :  Eva Segal ()            Patient called in regards to ENT order. Message was placed for [place] appointment.[TA1.1M] Message confirmed with caller.[TA1.1T]       Revision History        User Key Date/Time User Provider Type Action    > TA1.1 12/19/17 11:39 AM Eva Segal  Sign    M - Manual, T - Template             never used

## 2023-02-24 NOTE — ASU PATIENT PROFILE, ADULT - LAST TOBACCO USE (DD-MM-YY)
Please call.  Vit D is normal.  Parathyroid hormone is normal.  Magnesium is low.  Calcium remains low.  Glucose high.   PLAN: Stop omeprazole.   Stop furosemide.   Recheck Chem8, serum magnesium and serum phosphorus next week.   Please arrange for orders.    OWEN AMATO MD   
01-Jan-2011

## 2023-02-24 NOTE — ASU PATIENT PROFILE, ADULT - FALL HARM RISK - RISK INTERVENTIONS

## 2023-02-26 ENCOUNTER — TRANSCRIPTION ENCOUNTER (OUTPATIENT)
Age: 63
End: 2023-02-26

## 2023-02-27 ENCOUNTER — TRANSCRIPTION ENCOUNTER (OUTPATIENT)
Age: 63
End: 2023-02-27

## 2023-02-27 ENCOUNTER — OUTPATIENT (OUTPATIENT)
Dept: OUTPATIENT SERVICES | Facility: HOSPITAL | Age: 63
LOS: 1 days | Discharge: HOME | End: 2023-02-27
Payer: MEDICAID

## 2023-02-27 ENCOUNTER — APPOINTMENT (OUTPATIENT)
Dept: ORTHOPEDIC SURGERY | Facility: AMBULATORY SURGERY CENTER | Age: 63
End: 2023-02-27

## 2023-02-27 VITALS
RESPIRATION RATE: 14 BRPM | SYSTOLIC BLOOD PRESSURE: 134 MMHG | DIASTOLIC BLOOD PRESSURE: 64 MMHG | HEART RATE: 68 BPM | OXYGEN SATURATION: 97 % | TEMPERATURE: 98 F

## 2023-02-27 VITALS
WEIGHT: 190.26 LBS | TEMPERATURE: 97 F | OXYGEN SATURATION: 97 % | HEART RATE: 75 BPM | RESPIRATION RATE: 17 BRPM | HEIGHT: 70 IN | SYSTOLIC BLOOD PRESSURE: 130 MMHG | DIASTOLIC BLOOD PRESSURE: 71 MMHG

## 2023-02-27 DIAGNOSIS — Z98.890 OTHER SPECIFIED POSTPROCEDURAL STATES: Chronic | ICD-10-CM

## 2023-02-27 DIAGNOSIS — Z96.641 PRESENCE OF RIGHT ARTIFICIAL HIP JOINT: Chronic | ICD-10-CM

## 2023-02-27 PROCEDURE — 29848 WRIST ENDOSCOPY/SURGERY: CPT | Mod: LT

## 2023-02-27 RX ORDER — SODIUM CHLORIDE 9 MG/ML
1000 INJECTION, SOLUTION INTRAVENOUS
Refills: 0 | Status: DISCONTINUED | OUTPATIENT
Start: 2023-02-27 | End: 2023-02-27

## 2023-02-27 RX ORDER — ONDANSETRON 8 MG/1
4 TABLET, FILM COATED ORAL ONCE
Refills: 0 | Status: DISCONTINUED | OUTPATIENT
Start: 2023-02-27 | End: 2023-02-27

## 2023-02-27 RX ORDER — ACETAMINOPHEN 500 MG
650 TABLET ORAL ONCE
Refills: 0 | Status: DISCONTINUED | OUTPATIENT
Start: 2023-02-27 | End: 2023-02-27

## 2023-02-27 NOTE — ASU DISCHARGE PLAN (ADULT/PEDIATRIC) - NS MD DC FALL RISK RISK
For information on Fall & Injury Prevention, visit: [email or web address] OR  [email or web address] OR  https://www.cdc.gov/steadi/patient.html

## 2023-02-27 NOTE — BRIEF OPERATIVE NOTE - NSICDXBRIEFPROCEDURE_GEN_ALL_CORE_FT
PROCEDURES:  Open release of left carpal tunnel 27-Feb-2023 09:10:50  Ag Keller   PROCEDURES:  Endoscopic carpal tunnel release of left wrist 27-Feb-2023 12:31:37  Ag Keller

## 2023-02-27 NOTE — ASU DISCHARGE PLAN (ADULT/PEDIATRIC) - ASU DC SPECIAL INSTRUCTIONSFT
Keep splint clean, dry & intact  Elevate hand using arm elevator pillow to reduce pain & swelling  Finger ROM is encouraged to reduce stiffness

## 2023-02-27 NOTE — PRE-ANESTHESIA EVALUATION ADULT - NSANTHPEFT_GEN_ALL_CORE
General: Appearance is consistent with chronological age. No abnormal facies.  EENT: Anicteric sclera; oropharynx clear, moist mucus membranes  Cardiovascular:  Rate and rhythm evaluated  Respiratory: Unlabored breathing  Neurological: Awake and alert, moves all extremities  Constitutional: METs>4 General: Appearance is consistent with chronological age. No abnormal facies.  EENT: Anicteric sclera; oropharynx clear, moist mucus membranes   (+)PE: right pupil no longer reactive to light sp retinal surgery and cataract.  Cardiovascular:  Rate and rhythm evaluated  Respiratory: Unlabored breathing  Neurological: Awake and alert, moves all extremities  Constitutional: METs>4

## 2023-02-27 NOTE — PRE-ANESTHESIA EVALUATION ADULT - NSANTHADDINFOFT_GEN_ALL_CORE
Discussed risks of general anesthesia, sedation, and peripheral nerve block-including risk of nerve injury. All questions answered and patient is in agreement Discussed risks of general anesthesia, sedation, and peripheral nerve block-including risk of nerve injury. All questions answered and patient is in agreement for left infraclavicular nerve block

## 2023-02-27 NOTE — PRE-ANESTHESIA EVALUATION ADULT - NSANTHPMHFT_GEN_ALL_CORE
PE: right pupil no longer reactive to light sp retinal surgery and cataract.    PsxH: retinal surgery and cataract right

## 2023-02-27 NOTE — ASU DISCHARGE PLAN (ADULT/PEDIATRIC) - CARE PROVIDER_API CALL
Chris Russo)  [facility]  [address], [address] [place] [address]  Phone: [phone]  Fax: [phone]  Established Patient  Follow Up Time:

## 2023-03-14 ENCOUNTER — NON-APPOINTMENT (OUTPATIENT)
Age: 63
End: 2023-03-14

## 2023-03-14 ENCOUNTER — APPOINTMENT (OUTPATIENT)
Dept: ORTHOPEDIC SURGERY | Facility: CLINIC | Age: 63
End: 2023-03-14

## 2023-03-17 ENCOUNTER — NON-APPOINTMENT (OUTPATIENT)
Age: 63
End: 2023-03-17

## 2023-03-17 ENCOUNTER — APPOINTMENT (OUTPATIENT)
Dept: ORTHOPEDIC SURGERY | Facility: CLINIC | Age: 63
End: 2023-03-17
Payer: MEDICAID

## 2023-03-21 ENCOUNTER — APPOINTMENT (OUTPATIENT)
Dept: ORTHOPEDIC SURGERY | Facility: CLINIC | Age: 63
End: 2023-03-21
Payer: MEDICAID

## 2023-03-21 PROCEDURE — 99024 POSTOP FOLLOW-UP VISIT: CPT

## 2023-03-27 ENCOUNTER — APPOINTMENT (OUTPATIENT)
Dept: PHYSICAL MEDICINE AND REHAB | Facility: CLINIC | Age: 63
End: 2023-03-27
Payer: MEDICAID

## 2023-03-27 ENCOUNTER — RESULT REVIEW (OUTPATIENT)
Age: 63
End: 2023-03-27

## 2023-03-27 ENCOUNTER — OUTPATIENT (OUTPATIENT)
Dept: OUTPATIENT SERVICES | Facility: HOSPITAL | Age: 63
LOS: 1 days | End: 2023-03-27
Payer: MEDICAID

## 2023-03-27 ENCOUNTER — NON-APPOINTMENT (OUTPATIENT)
Age: 63
End: 2023-03-27

## 2023-03-27 ENCOUNTER — APPOINTMENT (OUTPATIENT)
Dept: RADIOLOGY | Facility: CLINIC | Age: 63
End: 2023-03-27

## 2023-03-27 VITALS — BODY MASS INDEX: 27.2 KG/M2 | HEIGHT: 70 IN | WEIGHT: 190 LBS

## 2023-03-27 DIAGNOSIS — M70.51 OTHER BURSITIS OF KNEE, RIGHT KNEE: ICD-10-CM

## 2023-03-27 DIAGNOSIS — Z96.641 PRESENCE OF RIGHT ARTIFICIAL HIP JOINT: Chronic | ICD-10-CM

## 2023-03-27 DIAGNOSIS — Z98.890 OTHER SPECIFIED POSTPROCEDURAL STATES: Chronic | ICD-10-CM

## 2023-03-27 DIAGNOSIS — M22.2X1 PATELLOFEMORAL DISORDERS, RIGHT KNEE: ICD-10-CM

## 2023-03-27 PROCEDURE — 73564 X-RAY EXAM KNEE 4 OR MORE: CPT | Mod: 26,LT,RT

## 2023-03-27 PROCEDURE — 99214 OFFICE O/P EST MOD 30 MIN: CPT

## 2023-03-27 NOTE — ASSESSMENT
[FreeTextEntry1] : Impression:\par 1. Bilateral Patellofemoral Pain\par 2. RIGHT Pes Anserine Bursitis\par \par Plan: The history, physical examination and imaging were reviewed. The imaging findings and diagnosis were discussed with the patient along with treatment options. I am recommending that the patient participate in a course of physical therapy. We emphasized the importance of the home exercise program. The patient was educated on red flag symptoms and was instructed to call the office should the current condition worsen or new symptoms develop. The patient will follow up in 6-8 weeks. The patient expressed verbal understanding and is in agreement with the plan of care. All of the patient's questions and concerns were addressed during today's visit.

## 2023-03-27 NOTE — HISTORY OF PRESENT ILLNESS
[FreeTextEntry1] : 03/27/2023 Mr. ORAL ESPINOZA is a very pleasant 62 year male who comes in for evaluation of Right Knee Pain  that has been ongoing for 3 months  without any specific injury or inciting event. Patient has tried Ibuprofen which did not help relief. The pain is located primarily Right knee Pain intermittent and described as achy, sharp . The pain is rated as 6/10 and ranges from 4-9/10. The patient's symptoms are aggravated by standing, walking, going up & down the stairs and alleviated by rest. The patient denies any night pain, numbness/tingling, weakness, or bowel/bladder dysfunction. The patient has no other complaints at this time.\par

## 2023-03-27 NOTE — PHYSICAL EXAM
[FreeTextEntry1] : GEN:  NAD, AAOx3.\par INSPECTION:  Effusion (-), Discoloration (-). Normal patellar tracking. \par GAIT: Non-antalgic.\par Knee AROM: Full (+) pain end-range Flex B/L. \par PALPATION:  Effusion (-), warmth (-), Crepitus  (-). (+) TTP R-Pes Anserine. No TTP patellar facets, medial/lateral joint line, popliteal fossa, patellar tendon, quad tendon, hamstrings, ITB.  \par MOTOR:  5/5 bilateral lower limbs. No spasticity, tremor, atrophy or contractures.\par SPECIAL:  Hyperflexion (+) B/L, Barry (-)B/L, Medial/Lateral compression (-) B/L, Varus/Valgus stress (-) B/L, Single Leg Squat (-) B/L, Anterior/Posterior drawer (-) B/L, Lachman (-) B/L.

## 2023-03-28 ENCOUNTER — APPOINTMENT (OUTPATIENT)
Dept: UROLOGY | Facility: CLINIC | Age: 63
End: 2023-03-28
Payer: MEDICAID

## 2023-03-28 VITALS — HEART RATE: 78 BPM | SYSTOLIC BLOOD PRESSURE: 138 MMHG | TEMPERATURE: 97.4 F | DIASTOLIC BLOOD PRESSURE: 82 MMHG

## 2023-03-28 PROCEDURE — 99213 OFFICE O/P EST LOW 20 MIN: CPT

## 2023-03-28 NOTE — HISTORY OF PRESENT ILLNESS
[FreeTextEntry1] : 62M here for follow up regarding ED. \par \par - patient reports 20 mg of cialis has "not been working" reports flagging. rates erection 6/10.\par - occasional morning erections. \par - reports otherwise in good health. \par - PSA monitored by PCP\par - endorses mild nocturia but is not bothered by the symptoms. \par - took viagra in the past and experienced flushing symptoms so switched to cialis.

## 2023-04-21 ENCOUNTER — APPOINTMENT (OUTPATIENT)
Dept: ORTHOPEDIC SURGERY | Facility: CLINIC | Age: 63
End: 2023-04-21
Payer: MEDICAID

## 2023-04-21 PROCEDURE — 99214 OFFICE O/P EST MOD 30 MIN: CPT | Mod: 25

## 2023-04-21 PROCEDURE — 20550 NJX 1 TENDON SHEATH/LIGAMENT: CPT | Mod: 79,RT

## 2023-05-19 ENCOUNTER — APPOINTMENT (OUTPATIENT)
Dept: NEUROLOGY | Facility: CLINIC | Age: 63
End: 2023-05-19
Payer: MEDICAID

## 2023-05-19 VITALS
DIASTOLIC BLOOD PRESSURE: 73 MMHG | SYSTOLIC BLOOD PRESSURE: 129 MMHG | BODY MASS INDEX: 27.63 KG/M2 | HEART RATE: 80 BPM | OXYGEN SATURATION: 97 % | RESPIRATION RATE: 16 BRPM | HEIGHT: 70 IN | TEMPERATURE: 97.3 F | WEIGHT: 193 LBS

## 2023-05-19 DIAGNOSIS — G25.0 ESSENTIAL TREMOR: ICD-10-CM

## 2023-05-19 PROCEDURE — 99214 OFFICE O/P EST MOD 30 MIN: CPT

## 2023-05-19 NOTE — HISTORY OF PRESENT ILLNESS
[FreeTextEntry1] : Braxton is a 58 yo man with a history of tremor, which has improved, but now with symptoms of hand numbness.\par \par Primidone is working well.  Sometimes anxiety will bring out the tremor.\par Working in the restaurant industry still.  Finding.\par \par Also on lexapro.\par \par How both CTS surgically released, including one last month.  A bit of tenderness.  No loss of strngth noticed, and symptoms releaved.\par \par \par \par Seen by Dr. Lee, had C-spine MRI which led to an epidural injection.\par \par Dr. Russo and EMG with Dr. Wheatley - felt did not require carpel tunnel surgery.\par Bilateral mild carpel tunnel with sensory conductions slow, and a left ulnar mild entrapmen at Guyan's canal.\par \par Continues to develop numbness in the fingers on a bike and lifting weights.\par \par Starting wrist splints at night this past Friday.\par No real improvement but just started the interventions.\par \par Primidone doing well, no tremors.\par \par

## 2023-05-19 NOTE — DISCUSSION/SUMMARY
[FreeTextEntry1] : Impression:\par 1) essential tremor with excellent response to primidone, now at 50/100, but could potentially use more when more anxious and lose is relatively low.\par \par Plan:\par 1) trial of primidone 100/100

## 2023-05-19 NOTE — PHYSICAL EXAM
[FreeTextEntry1] : General:\par Constitutional:  Sitting comfortably in NAD.\par Psychiatric: well-groomed, appropriate affect\par Ears, Nose, Throat: no abnormalities, mucus membranes moist\par Neck: supple\par Extremities: no edema, clubbing or cyanosis\par Skin: no rash or neuro-cutaneous signs \par \par Cognitive:\par Orientation, language, memory and knowledge screens intact.\par \par Cranial Nerves:\par II: MARIAMA. III/IV/VI: EOM Full.  VII: Face appears symmetric VIII: Normal to screening\par IX/X: normal phonation  XI: Trapezius Symmetric  XII: Tongue midline\par \par Motor:  Power: no pronator drift\par \par Narrow based gait\par \par No tremor\par \par

## 2023-07-13 ENCOUNTER — APPOINTMENT (OUTPATIENT)
Dept: ORTHOPEDIC SURGERY | Facility: CLINIC | Age: 63
End: 2023-07-13
Payer: MEDICAID

## 2023-07-13 VITALS — WEIGHT: 190 LBS | HEIGHT: 70 IN | BODY MASS INDEX: 27.2 KG/M2 | RESPIRATION RATE: 16 BRPM

## 2023-07-13 PROCEDURE — 73030 X-RAY EXAM OF SHOULDER: CPT | Mod: LT,RT

## 2023-07-13 PROCEDURE — 99214 OFFICE O/P EST MOD 30 MIN: CPT | Mod: 25

## 2023-07-13 PROCEDURE — 20610 DRAIN/INJ JOINT/BURSA W/O US: CPT | Mod: RT

## 2023-07-24 ENCOUNTER — APPOINTMENT (OUTPATIENT)
Dept: ORTHOPEDIC SURGERY | Facility: CLINIC | Age: 63
End: 2023-07-24

## 2023-08-08 ENCOUNTER — RESULT REVIEW (OUTPATIENT)
Age: 63
End: 2023-08-08

## 2023-08-08 ENCOUNTER — OUTPATIENT (OUTPATIENT)
Dept: OUTPATIENT SERVICES | Facility: HOSPITAL | Age: 63
LOS: 1 days | End: 2023-08-08
Payer: COMMERCIAL

## 2023-08-08 ENCOUNTER — APPOINTMENT (OUTPATIENT)
Dept: ORTHOPEDIC SURGERY | Facility: CLINIC | Age: 63
End: 2023-08-08
Payer: MEDICAID

## 2023-08-08 VITALS
WEIGHT: 190 LBS | SYSTOLIC BLOOD PRESSURE: 117 MMHG | OXYGEN SATURATION: 99 % | BODY MASS INDEX: 27.2 KG/M2 | HEIGHT: 70 IN | HEART RATE: 95 BPM | DIASTOLIC BLOOD PRESSURE: 75 MMHG

## 2023-08-08 DIAGNOSIS — Z47.1 AFTERCARE FOLLOWING JOINT REPLACEMENT SURGERY: ICD-10-CM

## 2023-08-08 DIAGNOSIS — Z98.890 OTHER SPECIFIED POSTPROCEDURAL STATES: Chronic | ICD-10-CM

## 2023-08-08 DIAGNOSIS — Z96.641 AFTERCARE FOLLOWING JOINT REPLACEMENT SURGERY: ICD-10-CM

## 2023-08-08 DIAGNOSIS — Z96.641 PRESENCE OF RIGHT ARTIFICIAL HIP JOINT: Chronic | ICD-10-CM

## 2023-08-08 DIAGNOSIS — Z96.641 PRESENCE OF RIGHT ARTIFICIAL HIP JOINT: ICD-10-CM

## 2023-08-08 DIAGNOSIS — M25.551 PAIN IN RIGHT HIP: ICD-10-CM

## 2023-08-08 DIAGNOSIS — M25.552 PAIN IN LEFT HIP: ICD-10-CM

## 2023-08-08 PROCEDURE — 73521 X-RAY EXAM HIPS BI 2 VIEWS: CPT

## 2023-08-08 PROCEDURE — 73521 X-RAY EXAM HIPS BI 2 VIEWS: CPT | Mod: 26

## 2023-08-08 PROCEDURE — 99214 OFFICE O/P EST MOD 30 MIN: CPT

## 2023-08-10 PROBLEM — Z47.1 AFTERCARE FOLLOWING RIGHT HIP JOINT REPLACEMENT SURGERY: Status: ACTIVE | Noted: 2020-07-31

## 2023-08-10 NOTE — END OF VISIT
[FreeTextEntry3] : Documented by Jose D Nelson acting as a scribe for Dr. Diallo Hwang. 08/08/2023   All medical record entries made by the Scribe were at my, Dr. Diallo Hwang, direction and personally dictated by me on 08/08/2023. I have reviewed the chart and agree that the record accurately reflects my personal performance of the history, physical exam, assessment and plan. I have also personally directed, reviewed, and agreed with the chart.

## 2023-08-10 NOTE — PHYSICAL EXAM
[de-identified] : General appearance: well nourished and hydrated, pleasant, alert and oriented x 3, cooperative. HEENT: normocephalic, EOM intact, wearing mask, external auditory canal clear. Cardiovascular: no lower leg edema, no varicosities, dorsalis pedis pulses palpable and symmetric. Lymphatics: no palpable lymphadenopathy, no lymphedema. Neurologic: sensation is normal, no muscle weakness in upper or lower extremities, patella tendon reflexes present and symmetric. Dermatologic: skin moist, warm, no rash. Spine: cervical spine with normal lordosis and painless range of motion, thoracic spine with normal kyphosis and painless range of motion, lumbosacral spine with normal lordosis and painless range of motion.  No tenderness to palpation along midline spine and paraspinal musculature.  Sacroiliac joints nontender bilaterally. Negative SLR and crossed SLR tests bilaterally. Gait: normal.   Limb lengths: clinically equal   Left hip: - Focal soft tissue swelling: none - Ecchymosis: none - Erythema: none - Wounds: none - Tenderness: Small TTP at both the anterior and lateral aspects of the hip - ROM:   - Flexion: 135   - Extension: 0   - Adduction: 15   - Abduction: 50   - Internal rotation in 90 degrees of hip flexion: 15   - External rotation in 90 degrees of hip flexion: 35 - ANAM: negative - FADIR: Mildly positive - Maria Luisa: negative - Stinchfield: negative - Flexor power: 5/5 - Abductor power: 5/5   Right hip: - Focal soft tissue swelling: none - Ecchymosis: none - Erythema: none - Wounds: Well healed anterior incision, benign appearing - Tenderness: none - ROM:   - Flexion: 135   - Extension: 0   - Adduction: 20   - Abduction: 60   - Internal rotation in 90 degrees of hip flexion: 30   - External rotation in 90 degrees of hip flexion: 60 - ANAM: negative - FADIR: negative - Maria Luisa: negative - Stinchfield: negative - Flexor power: 5/5 - Abductor power: 5/5 [de-identified] : AP pelvis and bilateral hip radiographs were obtained.   Pelvic Alignment: Normal  Right Hip -- Demonstrates stable appearance of his right total hip arthroplasty as compared to prior imaging without evidence of mechanical complication.  Left Hp -- Arthritis: Mild osteoarthritis with predominantly superior joint space narrowing. TONNIS 1. Deformity: CAM Osteonecrosis: None  His lumbar fusion hardware is incompletely visualized in today's films. It does appear to be grossly intact.

## 2023-08-10 NOTE — DISCUSSION/SUMMARY
[de-identified] : 64 y/o male with well functioning right total hip arthroplasty and persistently symptomatic moderate left hip osteoarthritis with associated labral tear.  - We'll continue with conservative management at this time including physical therapy, home exercise program, Tylenol and Celebrex as needed.  - I provided him with a referral to Interventional Radiology to undergo a left hip corticosteroid injection.  - We did have a discussion regarding the risks and benefits of cortisone injections to the hip and we specifically discussed that it can potentially be a risk factor for osteonecrosis.  He fully understood the discussion as well as the attendant risks. He still wishes to proceed with left hip cortisone injections and so we will follow through on the referral.  - We reviewed that if he experiences excellent relief then we may consider repeating the cortisone injections twice a year, contingent on repeat X-rays demonstrating no progression of osteonecrosis.  - Therefore, next follow-up will be in six months with repeat bilateral hip X-rays.

## 2023-08-11 ENCOUNTER — APPOINTMENT (OUTPATIENT)
Dept: ORTHOPEDIC SURGERY | Facility: CLINIC | Age: 63
End: 2023-08-11
Payer: MEDICAID

## 2023-08-11 PROCEDURE — 20550 NJX 1 TENDON SHEATH/LIGAMENT: CPT | Mod: FA

## 2023-08-11 PROCEDURE — 99213 OFFICE O/P EST LOW 20 MIN: CPT | Mod: 25

## 2023-08-11 NOTE — PHYSICAL EXAM
[de-identified] : Physical exam demonstrates the patient to be alert and oriented x 3 and capable of ambulation. The patient is well-developed and well-nourished in no apparent respiratory distress. The majority of the skin is intact bilaterally in the upper extremities without any bilateral elbow lymphadenopathy.  Full, symmetric digital range of motion.  Tender to palpation over the left thumb and ring finger A1 pulley, locking can be appreciated.  Nontender over the MCP joint/collateral ligament.  No collateral ligament instability or extensor tendon subluxation.  Flexor and extensor tendons intact.  Sensation is intact to light touch.  All fingers are well-perfused.

## 2023-08-11 NOTE — HISTORY OF PRESENT ILLNESS
[FreeTextEntry1] : The patient is returning for follow-up today regarding new issues today, left thumb and ring finger pain which she feels are trigger fingers.  This is been ongoing for several months.  He previously underwent bilateral endoscopic carpal tunnel releases with excellent relief of symptoms.  He has also been treated by me for a symptomatic right trigger thumb, 2 prior corticosteroid injections were given, most recently April 21, 2023.  These symptoms have been under control.

## 2023-08-11 NOTE — PROCEDURE
[FreeTextEntry1] : My impression is that the patient has stenosing tenosynovitis of the left thumb and ring finger. We discussed treatment options and he elected to undergo a trigger digit injection into the left thumb and ring finger. The risks, benefits, and alternatives were discussed with the patient. This included, but was not limited to nerve injury, infection, subcutaneous atrophy, skin depigmentation etc. Under informed consent and sterile conditions the flexor tendon sheath at the A1 pulley was injected with a combination of 1/2 cc Kenalog-10 and 1/2 cc of 2% plain lidocaine. It is my hopes that this significantly alleviates the patients symptoms.

## 2023-08-11 NOTE — ASSESSMENT
[FreeTextEntry1] : My impression is that the patient has a left trigger thumb and ring finger trigger finger. Initial treatment options were discussed shared decision-making was made to proceed with a corticosteroid injection into the flexor tendon sheath of the left thumb and ring finger today. I explained that recurrence of symptoms is not uncommon. If this were to occur, consideration for another injection will be made. I did note that multiple, repeated injections become less efficacious over time and eventually, surgery should be considered. All questions were answered. The patient was well in accordance with the plan. I noted that it may take 1-2 weeks for the steroid to take effect. The patient will follow up with me on an as-needed basis.

## 2023-08-14 ENCOUNTER — APPOINTMENT (OUTPATIENT)
Dept: INTERVENTIONAL RADIOLOGY/VASCULAR | Facility: HOSPITAL | Age: 63
End: 2023-08-14
Payer: MEDICAID

## 2023-08-14 ENCOUNTER — OUTPATIENT (OUTPATIENT)
Dept: OUTPATIENT SERVICES | Facility: HOSPITAL | Age: 63
LOS: 1 days | End: 2023-08-14
Payer: COMMERCIAL

## 2023-08-14 ENCOUNTER — RESULT REVIEW (OUTPATIENT)
Age: 63
End: 2023-08-14

## 2023-08-14 DIAGNOSIS — Z98.890 OTHER SPECIFIED POSTPROCEDURAL STATES: Chronic | ICD-10-CM

## 2023-08-14 DIAGNOSIS — M25.552 PAIN IN LEFT HIP: ICD-10-CM

## 2023-08-14 DIAGNOSIS — Z96.641 PRESENCE OF RIGHT ARTIFICIAL HIP JOINT: Chronic | ICD-10-CM

## 2023-08-14 PROCEDURE — 20610 DRAIN/INJ JOINT/BURSA W/O US: CPT

## 2023-08-14 PROCEDURE — 20610 DRAIN/INJ JOINT/BURSA W/O US: CPT | Mod: LT

## 2023-08-14 PROCEDURE — 77002 NEEDLE LOCALIZATION BY XRAY: CPT | Mod: 26

## 2023-08-14 PROCEDURE — 77002 NEEDLE LOCALIZATION BY XRAY: CPT

## 2023-08-15 RX ORDER — CELECOXIB 200 MG/1
200 CAPSULE ORAL TWICE DAILY
Qty: 60 | Refills: 2 | Status: DISCONTINUED | COMMUNITY
Start: 2023-08-08 | End: 2023-08-15

## 2023-09-11 RX ORDER — TAMSULOSIN HYDROCHLORIDE 0.4 MG/1
0.4 CAPSULE ORAL
Qty: 90 | Refills: 3 | Status: ACTIVE | COMMUNITY
Start: 2023-09-11 | End: 1900-01-01

## 2023-09-12 ENCOUNTER — NON-APPOINTMENT (OUTPATIENT)
Age: 63
End: 2023-09-12

## 2023-09-14 ENCOUNTER — OUTPATIENT (OUTPATIENT)
Dept: OUTPATIENT SERVICES | Facility: HOSPITAL | Age: 63
LOS: 1 days | End: 2023-09-14

## 2023-09-14 ENCOUNTER — APPOINTMENT (OUTPATIENT)
Dept: RADIOLOGY | Facility: CLINIC | Age: 63
End: 2023-09-14
Payer: MEDICAID

## 2023-09-14 DIAGNOSIS — Z96.641 PRESENCE OF RIGHT ARTIFICIAL HIP JOINT: Chronic | ICD-10-CM

## 2023-09-14 DIAGNOSIS — Z98.890 OTHER SPECIFIED POSTPROCEDURAL STATES: Chronic | ICD-10-CM

## 2023-09-14 PROCEDURE — 72070 X-RAY EXAM THORAC SPINE 2VWS: CPT | Mod: 26

## 2023-09-15 PROCEDURE — 77080 DXA BONE DENSITY AXIAL: CPT | Mod: 26

## 2023-09-19 ENCOUNTER — APPOINTMENT (OUTPATIENT)
Dept: ORTHOPEDIC SURGERY | Facility: CLINIC | Age: 63
End: 2023-09-19
Payer: MEDICAID

## 2023-09-19 PROCEDURE — ZZZZZ: CPT

## 2023-09-26 ENCOUNTER — APPOINTMENT (OUTPATIENT)
Dept: ORTHOPEDIC SURGERY | Facility: CLINIC | Age: 63
End: 2023-09-26
Payer: MEDICAID

## 2023-09-26 PROCEDURE — 20550 NJX 1 TENDON SHEATH/LIGAMENT: CPT

## 2023-09-26 PROCEDURE — 99214 OFFICE O/P EST MOD 30 MIN: CPT | Mod: 25

## 2023-10-23 ENCOUNTER — NON-APPOINTMENT (OUTPATIENT)
Age: 63
End: 2023-10-23

## 2023-11-14 ENCOUNTER — APPOINTMENT (OUTPATIENT)
Dept: ORTHOPEDIC SURGERY | Facility: CLINIC | Age: 63
End: 2023-11-14
Payer: MEDICAID

## 2023-11-14 ENCOUNTER — RX RENEWAL (OUTPATIENT)
Age: 63
End: 2023-11-14

## 2023-11-14 VITALS — HEIGHT: 70 IN | WEIGHT: 185 LBS | BODY MASS INDEX: 26.48 KG/M2

## 2023-11-14 PROCEDURE — 99212 OFFICE O/P EST SF 10 MIN: CPT

## 2023-11-14 RX ORDER — MELOXICAM 7.5 MG/1
7.5 TABLET ORAL DAILY
Qty: 30 | Refills: 2 | Status: DISCONTINUED | COMMUNITY
Start: 2023-08-15 | End: 2023-11-14

## 2023-11-14 RX ORDER — BETAMETHASONE DIPROPIONATE 0.5 MG/G
0.05 CREAM, AUGMENTED TOPICAL
Qty: 1 | Refills: 3 | Status: DISCONTINUED | COMMUNITY
Start: 2021-12-13 | End: 2023-11-14

## 2023-11-14 RX ORDER — ACETAMINOPHEN AND CODEINE 300; 30 MG/1; MG/1
300-30 TABLET ORAL
Qty: 20 | Refills: 0 | Status: DISCONTINUED | COMMUNITY
Start: 2022-03-12 | End: 2023-11-14

## 2023-11-14 RX ORDER — MELOXICAM 7.5 MG/1
7.5 TABLET ORAL
Qty: 30 | Refills: 2 | Status: DISCONTINUED | COMMUNITY
Start: 2022-10-19 | End: 2023-11-14

## 2023-11-14 RX ORDER — ACETAMINOPHEN AND CODEINE 300; 30 MG/1; MG/1
300-30 TABLET ORAL
Qty: 20 | Refills: 0 | Status: DISCONTINUED | COMMUNITY
Start: 2023-02-23 | End: 2023-11-14

## 2023-11-14 RX ORDER — ETODOLAC 400 MG/1
400 TABLET, FILM COATED ORAL
Qty: 60 | Refills: 0 | Status: DISCONTINUED | COMMUNITY
Start: 2022-06-27 | End: 2023-11-14

## 2023-11-21 ENCOUNTER — APPOINTMENT (OUTPATIENT)
Dept: UROLOGY | Facility: CLINIC | Age: 63
End: 2023-11-21
Payer: MEDICAID

## 2023-11-21 VITALS
TEMPERATURE: 96 F | SYSTOLIC BLOOD PRESSURE: 136 MMHG | HEART RATE: 65 BPM | DIASTOLIC BLOOD PRESSURE: 77 MMHG | HEIGHT: 70 IN | WEIGHT: 185 LBS | BODY MASS INDEX: 26.48 KG/M2

## 2023-11-21 DIAGNOSIS — N52.9 MALE ERECTILE DYSFUNCTION, UNSPECIFIED: ICD-10-CM

## 2023-11-21 PROCEDURE — 99213 OFFICE O/P EST LOW 20 MIN: CPT

## 2023-11-21 RX ORDER — SILDENAFIL 100 MG/1
100 TABLET, FILM COATED ORAL
Qty: 60 | Refills: 3 | Status: ACTIVE | COMMUNITY
Start: 2023-11-21 | End: 1900-01-01

## 2023-11-21 RX ORDER — SILDENAFIL 100 MG/1
100 TABLET, FILM COATED ORAL
Qty: 12 | Refills: 5 | Status: ACTIVE | COMMUNITY
Start: 2021-01-07 | End: 1900-01-01

## 2023-11-27 ENCOUNTER — APPOINTMENT (OUTPATIENT)
Dept: DERMATOLOGY | Facility: CLINIC | Age: 63
End: 2023-11-27

## 2023-11-28 ENCOUNTER — APPOINTMENT (OUTPATIENT)
Dept: DERMATOLOGY | Facility: CLINIC | Age: 63
End: 2023-11-28
Payer: MEDICAID

## 2023-11-28 DIAGNOSIS — L81.4 OTHER MELANIN HYPERPIGMENTATION: ICD-10-CM

## 2023-11-28 DIAGNOSIS — D48.7 NEOPLASM OF UNCERTAIN BEHAVIOR OF OTHER SPECIFIED SITES: ICD-10-CM

## 2023-11-28 DIAGNOSIS — D22.9 MELANOCYTIC NEVI, UNSPECIFIED: ICD-10-CM

## 2023-11-28 DIAGNOSIS — L72.9 FOLLICULAR CYST OF THE SKIN AND SUBCUTANEOUS TISSUE, UNSPECIFIED: ICD-10-CM

## 2023-11-28 DIAGNOSIS — Z85.820 PERSONAL HISTORY OF MALIGNANT MELANOMA OF SKIN: ICD-10-CM

## 2023-11-28 DIAGNOSIS — Z80.8 FAMILY HISTORY OF MALIGNANT NEOPLASM OF OTHER ORGANS OR SYSTEMS: ICD-10-CM

## 2023-11-28 DIAGNOSIS — Z12.83 ENCOUNTER FOR SCREENING FOR MALIGNANT NEOPLASM OF SKIN: ICD-10-CM

## 2023-11-28 PROCEDURE — 99213 OFFICE O/P EST LOW 20 MIN: CPT

## 2023-11-28 RX ORDER — ESCITALOPRAM OXALATE 20 MG/1
20 TABLET, FILM COATED ORAL
Refills: 0 | Status: ACTIVE | COMMUNITY

## 2023-11-29 ENCOUNTER — APPOINTMENT (OUTPATIENT)
Dept: ORTHOPEDIC SURGERY | Facility: CLINIC | Age: 63
End: 2023-11-29
Payer: MEDICAID

## 2023-11-29 ENCOUNTER — RESULT REVIEW (OUTPATIENT)
Age: 63
End: 2023-11-29

## 2023-11-29 ENCOUNTER — OUTPATIENT (OUTPATIENT)
Dept: OUTPATIENT SERVICES | Facility: HOSPITAL | Age: 63
LOS: 1 days | End: 2023-11-29
Payer: COMMERCIAL

## 2023-11-29 VITALS
SYSTOLIC BLOOD PRESSURE: 125 MMHG | HEART RATE: 82 BPM | BODY MASS INDEX: 26.48 KG/M2 | HEIGHT: 70 IN | WEIGHT: 185 LBS | DIASTOLIC BLOOD PRESSURE: 76 MMHG | OXYGEN SATURATION: 97 %

## 2023-11-29 DIAGNOSIS — Z96.641 PRESENCE OF RIGHT ARTIFICIAL HIP JOINT: Chronic | ICD-10-CM

## 2023-11-29 DIAGNOSIS — Z98.890 OTHER SPECIFIED POSTPROCEDURAL STATES: Chronic | ICD-10-CM

## 2023-11-29 DIAGNOSIS — M16.12 UNILATERAL PRIMARY OSTEOARTHRITIS, LEFT HIP: ICD-10-CM

## 2023-11-29 PROCEDURE — 73521 X-RAY EXAM HIPS BI 2 VIEWS: CPT

## 2023-11-29 PROCEDURE — 99214 OFFICE O/P EST MOD 30 MIN: CPT

## 2023-11-29 PROCEDURE — 73521 X-RAY EXAM HIPS BI 2 VIEWS: CPT | Mod: 26

## 2023-12-03 PROBLEM — M16.12 PRIMARY OSTEOARTHRITIS OF LEFT HIP: Status: ACTIVE | Noted: 2022-11-08

## 2023-12-10 ENCOUNTER — NON-APPOINTMENT (OUTPATIENT)
Age: 63
End: 2023-12-10

## 2023-12-12 ENCOUNTER — APPOINTMENT (OUTPATIENT)
Dept: DERMATOLOGY | Facility: CLINIC | Age: 63
End: 2023-12-12
Payer: MEDICAID

## 2023-12-12 VITALS — DIASTOLIC BLOOD PRESSURE: 76 MMHG | SYSTOLIC BLOOD PRESSURE: 125 MMHG

## 2023-12-12 DIAGNOSIS — D48.9 NEOPLASM OF UNCERTAIN BEHAVIOR, UNSPECIFIED: ICD-10-CM

## 2023-12-12 DIAGNOSIS — Z86.03 PERSONAL HISTORY OF NEOPLASM OF UNCERTAIN BEHAVIOR: ICD-10-CM

## 2023-12-12 PROCEDURE — 11402 EXC TR-EXT B9+MARG 1.1-2 CM: CPT

## 2023-12-12 PROCEDURE — 11104 PUNCH BX SKIN SINGLE LESION: CPT | Mod: 59

## 2023-12-12 PROCEDURE — 12032 INTMD RPR S/A/T/EXT 2.6-7.5: CPT | Mod: 59

## 2023-12-12 RX ORDER — TRAMADOL HYDROCHLORIDE 50 MG/1
50 TABLET, COATED ORAL
Qty: 21 | Refills: 0 | Status: DISCONTINUED | COMMUNITY
Start: 2023-12-04 | End: 2023-12-12

## 2023-12-20 VITALS
HEART RATE: 72 BPM | RESPIRATION RATE: 16 BRPM | OXYGEN SATURATION: 99 % | HEIGHT: 70 IN | WEIGHT: 188.72 LBS | SYSTOLIC BLOOD PRESSURE: 142 MMHG | TEMPERATURE: 98 F | DIASTOLIC BLOOD PRESSURE: 82 MMHG

## 2023-12-20 RX ORDER — NICOTINE POLACRILEX 2 MG
1 GUM BUCCAL
Refills: 0 | DISCHARGE

## 2023-12-20 RX ORDER — POVIDONE-IODINE 5 %
1 AEROSOL (ML) TOPICAL ONCE
Refills: 0 | Status: COMPLETED | OUTPATIENT
Start: 2023-12-21 | End: 2023-12-21

## 2023-12-20 NOTE — H&P ADULT - HISTORY OF PRESENT ILLNESS
64yo M with left hip pain x     Presents today for elective left total hip replacement with Dr. Hwang. 62yo M with left hip pain x     Presents today for elective left total hip replacement with Dr. Hwang. 64yo M with left hip pain x  1 year without accident or injury to bring on pain. Pain persists despite conservative measures. Pt notes he worked as a  and was always on his feet until he retired. He takes tylenol for pain. Pt had a right EDUAR with Dr. Hwang 3 years ago without complication. Ambulates with no assistance at baseline.     Presents today for elective left total hip replacement with Dr. Hwang. 62yo M with left hip pain x  1 year without accident or injury to bring on pain. Pain persists despite conservative measures. Pt notes he worked as a  and was always on his feet until he retired. He takes tylenol for pain. Pt had a right EDUAR with Dr. Hwang 3 years ago without complication. Ambulates with no assistance at baseline.     Presents today for elective left total hip replacement with Dr. Hwang.

## 2023-12-20 NOTE — ASU PATIENT PROFILE, ADULT - NS SC CAGE ALCOHOL GUILTY ABOUT
Initiate Treatment: triamcinolone acetonide 0.1 % topical cream Apply twice daily to rash up to 2 weeks/month as needed. Apply skin moisturizing cream on top Detail Level: Zone Render In Strict Bullet Format?: No no

## 2023-12-20 NOTE — H&P ADULT - NSHPPHYSICALEXAM_GEN_ALL_CORE
MSK: Decreased left hip ROM secondary to pain      Rest of PE per MD clearance MSK: Decreased left hip ROM secondary to pain  MSK: No deformity or open wounds. No rashes or lesions. EHL/TA/GS/FHL 5/5 BLE. Sensation intact and equal BLE. Skin warm and well perfused. DP palpable BLE. Cap refill brisk. Negative evin bilaterally     Rest of PE per MD clearance

## 2023-12-20 NOTE — ASU PATIENT PROFILE, ADULT - NSICDXPASTSURGICALHX_GEN_ALL_CORE_FT
PAST SURGICAL HISTORY:  History of hip replacement, total, right 2020    Previous back surgery     S/P biopsy back, benign cyst    S/P carpal tunnel release     S/P knee surgery b/l knee ACL repair     PAST SURGICAL HISTORY:  History of hip replacement, total, right 2020    Previous back surgery     S/P biopsy back, benign cyst    S/P carpal tunnel release b/l    S/P knee surgery b/l knee ACL repair     PAST SURGICAL HISTORY:  History of hip replacement, total, right 2020    Previous back surgery LOWER BACK FUSION    S/P biopsy back, benign cyst    S/P carpal tunnel release b/l    S/P knee surgery b/l knee ACL repair

## 2023-12-20 NOTE — ASU PATIENT PROFILE, ADULT - PRO ARRIVE FROM
March 11, 2019      Mame Griffith MD  [address] [facility]  [address]  [facility] [address]           [facility]  [address] [place] [address]  Phone: [phone]          Patient: Luisa Padilla   MR Number: [id number]   YOB: 1956   Date of Visit: 3/11/2019       Dear Dr. Mame Griffith:    Thank you for referring Luisa Padilla to me for evaluation. Attached you will find relevant portions of my assessment and plan of care.    If you have questions, please do not hesitate to call me. I look forward to following Luisa Padilla along with you.    Sincerely,    Lauren Lopez MD    Enclosure  CC:  No Recipients    If you would like to receive this communication electronically, please contact [email or web address] or [phone] to request more information on [facility] access.    For providers and/or their staff who would like to refer a patient to [facility], please contact us through our one-stop-shop provider referral line, [facility], at [phone].    If you feel you have received this communication in error or would no longer like to receive these types of communications, please e-mail [email or web address]         
home

## 2023-12-20 NOTE — H&P ADULT - NSICDXPASTSURGICALHX_GEN_ALL_CORE_FT
PAST SURGICAL HISTORY:  History of hip replacement, total, right 2020    Previous back surgery     S/P biopsy back, benign cyst    S/P carpal tunnel release     S/P knee surgery b/l knee ACL repair

## 2023-12-20 NOTE — H&P ADULT - NSHPLABSRESULTS_GEN_ALL_CORE
Preop CBC, BMP, PT/INR, PTT within normal range and reviewed per medical clearance  A1c: 5.6  H/H:13.8/41.1  Cr: 1.17  UA: + protein, cx negative  Preop CXR within normal limits and reviewed per medical clearance   Preop EKG HR 61bpm, within normal limits and reviewed per medical clearance  3M: DOS

## 2023-12-20 NOTE — ASU PATIENT PROFILE, ADULT - FALL HARM RISK - RISK INTERVENTIONS
Communicate Fall Risk and Risk Factors to all staff, patient, and family/Reinforce activity limits and safety measures with patient and family/Visual Cue: Yellow wristband/Bed in lowest position, wheels locked, appropriate side rails in place/Call bell, personal items and telephone in reach/Instruct patient to call for assistance before getting out of bed or chair/Non-slip footwear when patient is out of bed/[place] to call system/Physically safe environment - no spills, clutter or unnecessary equipment/Purposeful Proactive Rounding/Room/bathroom lighting operational, light cord in reach Communicate Fall Risk and Risk Factors to all staff, patient, and family/Reinforce activity limits and safety measures with patient and family/Visual Cue: Yellow wristband/Bed in lowest position, wheels locked, appropriate side rails in place/Call bell, personal items and telephone in reach/Instruct patient to call for assistance before getting out of bed or chair/Non-slip footwear when patient is out of bed/[place] to call system/Physically safe environment - no spills, clutter or unnecessary equipment/Purposeful Proactive Rounding/Room/bathroom lighting operational, light cord in reach

## 2023-12-20 NOTE — H&P ADULT - NSICDXPASTMEDICALHX_GEN_ALL_CORE_FT
PAST MEDICAL HISTORY:  Essential tremor arms    HLD (hyperlipidemia)     Low back pain     Osteonecrosis right hip

## 2023-12-20 NOTE — H&P ADULT - PROBLEM SELECTOR PLAN 1
Admit to Orthopaedic Service.  Presents today for elective left total hip replacement  Pt medically stable and cleared for procedure today by Dr. Page and Dr. Smith

## 2023-12-20 NOTE — ASU PATIENT PROFILE, ADULT - NSICDXPASTMEDICALHX_GEN_ALL_CORE_FT
PAST MEDICAL HISTORY:  Essential tremor arms    HLD (hyperlipidemia)     Low back pain     Osteonecrosis right hip     PAST MEDICAL HISTORY:  Anxiety     BPH (benign prostatic hyperplasia)     Essential tremor arms    HLD (hyperlipidemia)     Low back pain     Osteonecrosis right hip

## 2023-12-21 ENCOUNTER — INPATIENT (INPATIENT)
Facility: HOSPITAL | Age: 63
LOS: 0 days | Discharge: HOME HEALTH | DRG: 470 | End: 2023-12-22
Attending: ORTHOPAEDIC SURGERY | Admitting: ORTHOPAEDIC SURGERY
Payer: COMMERCIAL

## 2023-12-21 ENCOUNTER — APPOINTMENT (OUTPATIENT)
Dept: ORTHOPEDIC SURGERY | Facility: HOSPITAL | Age: 63
End: 2023-12-21

## 2023-12-21 DIAGNOSIS — Z98.890 OTHER SPECIFIED POSTPROCEDURAL STATES: Chronic | ICD-10-CM

## 2023-12-21 DIAGNOSIS — M54.5 LOW BACK PAIN: ICD-10-CM

## 2023-12-21 DIAGNOSIS — Z96.641 PRESENCE OF RIGHT ARTIFICIAL HIP JOINT: Chronic | ICD-10-CM

## 2023-12-21 DIAGNOSIS — M16.12 UNILATERAL PRIMARY OSTEOARTHRITIS, LEFT HIP: ICD-10-CM

## 2023-12-21 DIAGNOSIS — G25.0 ESSENTIAL TREMOR: ICD-10-CM

## 2023-12-21 DIAGNOSIS — E78.5 HYPERLIPIDEMIA, UNSPECIFIED: ICD-10-CM

## 2023-12-21 LAB — DERMATOLOGY BIOPSY: NORMAL

## 2023-12-21 PROCEDURE — 27130 TOTAL HIP ARTHROPLASTY: CPT | Mod: LT

## 2023-12-21 DEVICE — SCREW HEX LO PROF 6.5X30MM: Type: IMPLANTABLE DEVICE | Site: LEFT | Status: FUNCTIONAL

## 2023-12-21 DEVICE — STEM ACC V40 127 DEG SZ 5 35X108MM 127 DEG: Type: IMPLANTABLE DEVICE | Site: LEFT | Status: FUNCTIONAL

## 2023-12-21 DEVICE — FEM HEAD 40MM: Type: IMPLANTABLE DEVICE | Site: LEFT | Status: FUNCTIONAL

## 2023-12-21 DEVICE — CELLERATE SURGICAL POWDER RX 5GM: Type: IMPLANTABLE DEVICE | Site: LEFT | Status: FUNCTIONAL

## 2023-12-21 DEVICE — SHELL ACET TRIDENT II F 56MM: Type: IMPLANTABLE DEVICE | Site: LEFT | Status: FUNCTIONAL

## 2023-12-21 DEVICE — SCREW HEX LO PROF 6.5X35MM: Type: IMPLANTABLE DEVICE | Site: LEFT | Status: FUNCTIONAL

## 2023-12-21 DEVICE — IMPLANTABLE DEVICE: Type: IMPLANTABLE DEVICE | Site: LEFT | Status: FUNCTIONAL

## 2023-12-21 DEVICE — SLEEVE UNIV V40  PLUS  4MM ADPTR: Type: IMPLANTABLE DEVICE | Site: LEFT | Status: FUNCTIONAL

## 2023-12-21 DEVICE — STEM NECK ANG HIP V40 127D SZ 6 35X111MM: Type: IMPLANTABLE DEVICE | Site: LEFT | Status: FUNCTIONAL

## 2023-12-21 RX ORDER — KETOROLAC TROMETHAMINE 30 MG/ML
15 SYRINGE (ML) INJECTION EVERY 6 HOURS
Refills: 0 | Status: DISCONTINUED | OUTPATIENT
Start: 2023-12-21 | End: 2023-12-22

## 2023-12-21 RX ORDER — INFLUENZA VIRUS VACCINE 15; 15; 15; 15 UG/.5ML; UG/.5ML; UG/.5ML; UG/.5ML
0.5 SUSPENSION INTRAMUSCULAR ONCE
Refills: 0 | Status: DISCONTINUED | OUTPATIENT
Start: 2023-12-21 | End: 2023-12-22

## 2023-12-21 RX ORDER — FOLIC ACID 0.8 MG
1 TABLET ORAL DAILY
Refills: 0 | Status: DISCONTINUED | OUTPATIENT
Start: 2023-12-21 | End: 2023-12-22

## 2023-12-21 RX ORDER — OXYCODONE HYDROCHLORIDE 5 MG/1
5 TABLET ORAL EVERY 4 HOURS
Refills: 0 | Status: DISCONTINUED | OUTPATIENT
Start: 2023-12-21 | End: 2023-12-22

## 2023-12-21 RX ORDER — SODIUM CHLORIDE 9 MG/ML
1000 INJECTION, SOLUTION INTRAVENOUS
Refills: 0 | Status: DISCONTINUED | OUTPATIENT
Start: 2023-12-22 | End: 2023-12-22

## 2023-12-21 RX ORDER — ASPIRIN/CALCIUM CARB/MAGNESIUM 324 MG
81 TABLET ORAL
Refills: 0 | Status: DISCONTINUED | OUTPATIENT
Start: 2023-12-21 | End: 2023-12-22

## 2023-12-21 RX ORDER — CELECOXIB 200 MG/1
400 CAPSULE ORAL ONCE
Refills: 0 | Status: COMPLETED | OUTPATIENT
Start: 2023-12-21 | End: 2023-12-21

## 2023-12-21 RX ORDER — ESCITALOPRAM OXALATE 10 MG/1
10 TABLET, FILM COATED ORAL DAILY
Refills: 0 | Status: DISCONTINUED | OUTPATIENT
Start: 2023-12-21 | End: 2023-12-21

## 2023-12-21 RX ORDER — OXYCODONE HYDROCHLORIDE 5 MG/1
10 TABLET ORAL EVERY 4 HOURS
Refills: 0 | Status: DISCONTINUED | OUTPATIENT
Start: 2023-12-21 | End: 2023-12-22

## 2023-12-21 RX ORDER — CEFAZOLIN SODIUM 1 G
2000 VIAL (EA) INJECTION EVERY 8 HOURS
Refills: 0 | Status: COMPLETED | OUTPATIENT
Start: 2023-12-21 | End: 2023-12-22

## 2023-12-21 RX ORDER — POLYETHYLENE GLYCOL 3350 17 G/17G
17 POWDER, FOR SOLUTION ORAL AT BEDTIME
Refills: 0 | Status: DISCONTINUED | OUTPATIENT
Start: 2023-12-21 | End: 2023-12-22

## 2023-12-21 RX ORDER — ACETAMINOPHEN 500 MG
1000 TABLET ORAL EVERY 8 HOURS
Refills: 0 | Status: DISCONTINUED | OUTPATIENT
Start: 2023-12-21 | End: 2023-12-22

## 2023-12-21 RX ORDER — CHLORHEXIDINE GLUCONATE 213 G/1000ML
1 SOLUTION TOPICAL ONCE
Refills: 0 | Status: COMPLETED | OUTPATIENT
Start: 2023-12-21 | End: 2023-12-21

## 2023-12-21 RX ORDER — ONDANSETRON 8 MG/1
4 TABLET, FILM COATED ORAL EVERY 6 HOURS
Refills: 0 | Status: DISCONTINUED | OUTPATIENT
Start: 2023-12-21 | End: 2023-12-22

## 2023-12-21 RX ORDER — ATORVASTATIN CALCIUM 80 MG/1
10 TABLET, FILM COATED ORAL AT BEDTIME
Refills: 0 | Status: DISCONTINUED | OUTPATIENT
Start: 2023-12-21 | End: 2023-12-21

## 2023-12-21 RX ORDER — HYDROMORPHONE HYDROCHLORIDE 2 MG/ML
0.5 INJECTION INTRAMUSCULAR; INTRAVENOUS; SUBCUTANEOUS
Refills: 0 | Status: COMPLETED | OUTPATIENT
Start: 2023-12-21 | End: 2023-12-28

## 2023-12-21 RX ORDER — LANOLIN ALCOHOL/MO/W.PET/CERES
5 CREAM (GRAM) TOPICAL AT BEDTIME
Refills: 0 | Status: DISCONTINUED | OUTPATIENT
Start: 2023-12-21 | End: 2023-12-22

## 2023-12-21 RX ORDER — MAGNESIUM HYDROXIDE 400 MG/1
30 TABLET, CHEWABLE ORAL DAILY
Refills: 0 | Status: DISCONTINUED | OUTPATIENT
Start: 2023-12-21 | End: 2023-12-22

## 2023-12-21 RX ORDER — CELECOXIB 200 MG/1
200 CAPSULE ORAL EVERY 12 HOURS
Refills: 0 | Status: DISCONTINUED | OUTPATIENT
Start: 2023-12-22 | End: 2023-12-22

## 2023-12-21 RX ORDER — ASPIRIN/CALCIUM CARB/MAGNESIUM 324 MG
81 TABLET ORAL
Refills: 0 | Status: CANCELLED | OUTPATIENT
Start: 2023-12-22 | End: 2023-12-21

## 2023-12-21 RX ORDER — HYDROMORPHONE HYDROCHLORIDE 2 MG/ML
0.5 INJECTION INTRAMUSCULAR; INTRAVENOUS; SUBCUTANEOUS
Refills: 0 | Status: DISCONTINUED | OUTPATIENT
Start: 2023-12-21 | End: 2023-12-22

## 2023-12-21 RX ORDER — TAMSULOSIN HYDROCHLORIDE 0.4 MG/1
0.4 CAPSULE ORAL AT BEDTIME
Refills: 0 | Status: DISCONTINUED | OUTPATIENT
Start: 2023-12-21 | End: 2023-12-21

## 2023-12-21 RX ORDER — APREPITANT 80 MG/1
40 CAPSULE ORAL ONCE
Refills: 0 | Status: COMPLETED | OUTPATIENT
Start: 2023-12-21 | End: 2023-12-21

## 2023-12-21 RX ORDER — PRIMIDONE 250 MG/1
50 TABLET ORAL
Refills: 0 | Status: DISCONTINUED | OUTPATIENT
Start: 2023-12-21 | End: 2023-12-21

## 2023-12-21 RX ORDER — PRIMIDONE 250 MG/1
100 TABLET ORAL
Refills: 0 | Status: DISCONTINUED | OUTPATIENT
Start: 2023-12-21 | End: 2023-12-21

## 2023-12-21 RX ORDER — PANTOPRAZOLE SODIUM 20 MG/1
40 TABLET, DELAYED RELEASE ORAL
Refills: 0 | Status: DISCONTINUED | OUTPATIENT
Start: 2023-12-21 | End: 2023-12-22

## 2023-12-21 RX ORDER — ACETAMINOPHEN 500 MG
1000 TABLET ORAL ONCE
Refills: 0 | Status: COMPLETED | OUTPATIENT
Start: 2023-12-21 | End: 2023-12-21

## 2023-12-21 RX ORDER — SENNA PLUS 8.6 MG/1
2 TABLET ORAL AT BEDTIME
Refills: 0 | Status: DISCONTINUED | OUTPATIENT
Start: 2023-12-21 | End: 2023-12-22

## 2023-12-21 RX ORDER — TADALAFIL 10 MG/1
1 TABLET, FILM COATED ORAL
Refills: 0 | DISCHARGE

## 2023-12-21 RX ADMIN — Medication 1 APPLICATION(S): at 10:00

## 2023-12-21 RX ADMIN — Medication 1000 MILLIGRAM(S): at 22:04

## 2023-12-21 RX ADMIN — OXYCODONE HYDROCHLORIDE 10 MILLIGRAM(S): 5 TABLET ORAL at 23:36

## 2023-12-21 RX ADMIN — SENNA PLUS 2 TABLET(S): 8.6 TABLET ORAL at 22:05

## 2023-12-21 RX ADMIN — Medication 100 MILLIGRAM(S): at 19:30

## 2023-12-21 RX ADMIN — OXYCODONE HYDROCHLORIDE 5 MILLIGRAM(S): 5 TABLET ORAL at 20:15

## 2023-12-21 RX ADMIN — OXYCODONE HYDROCHLORIDE 5 MILLIGRAM(S): 5 TABLET ORAL at 19:29

## 2023-12-21 RX ADMIN — CELECOXIB 400 MILLIGRAM(S): 200 CAPSULE ORAL at 09:57

## 2023-12-21 RX ADMIN — APREPITANT 40 MILLIGRAM(S): 80 CAPSULE ORAL at 09:57

## 2023-12-21 RX ADMIN — CHLORHEXIDINE GLUCONATE 1 APPLICATION(S): 213 SOLUTION TOPICAL at 09:49

## 2023-12-21 RX ADMIN — Medication 15 MILLIGRAM(S): at 18:23

## 2023-12-21 RX ADMIN — Medication 15 MILLIGRAM(S): at 23:36

## 2023-12-21 RX ADMIN — POLYETHYLENE GLYCOL 3350 17 GRAM(S): 17 POWDER, FOR SOLUTION ORAL at 22:05

## 2023-12-21 RX ADMIN — Medication 1000 MILLIGRAM(S): at 09:57

## 2023-12-21 NOTE — PATIENT PROFILE ADULT - FUNCTIONAL ASSESSMENT - DAILY ACTIVITY 6.
Hx of HTN  home meds - toprol, amlodipine, olmesartan, lasix  BP monitoring  continue toprol, amlo, losartan  hold lasix for now
4 = No assist / stand by assistance

## 2023-12-21 NOTE — PHYSICAL THERAPY INITIAL EVALUATION ADULT - PERTINENT HX OF CURRENT PROBLEM, REHAB EVAL
62yo M with left hip pain x  1 year without accident or injury to bring on pain. Pain persists despite conservative measures. Pt notes he worked as a  and was always on his feet until he retired. He takes tylenol for pain. Pt had a right EDUAR with Dr. Hwang 3 years ago without complication. Now s/p left THR

## 2023-12-21 NOTE — PHYSICAL THERAPY INITIAL EVALUATION ADULT - GENERAL OBSERVATIONS, REHAB EVAL
Patient received semi-amin in bed  in NAD on RA, +SCDs, +PIV, +Telemetry, +JERED. Cleared by MILLY Francisco. Agreeable to PT.

## 2023-12-21 NOTE — PHYSICAL THERAPY INITIAL EVALUATION ADULT - MODALITIES TREATMENT COMMENTS
Supine, bilateral LEs: glute sets, quad sets, heel slides, ankle pumps (all through full range, 1 set, 10 reps). Patient tolerated therex well. Educated patient to perform therex regimen 3-5x/day, patient verbalized understanding; written handout provided and HEP copy placed in patient's paper chart.

## 2023-12-21 NOTE — PHYSICAL THERAPY INITIAL EVALUATION ADULT - ADDITIONAL COMMENTS
Patient lives with 'woman' in private house with 6 steps to enter and Flight of stairs inside. Doesn't use any Assistive Devices at baseline. Owns SC and shower chair. Denies recent Hx of falls.

## 2023-12-21 NOTE — PATIENT PROFILE ADULT - FUNCTIONAL ASSESSMENT - BASIC MOBILITY 6.
3-calculated by average/Not able to assess (calculate score using [facility] averaging method)  3-calculated by average/Not able to assess (calculate score using [facility] averaging method)

## 2023-12-21 NOTE — PROGRESS NOTE ADULT - SUBJECTIVE AND OBJECTIVE BOX
Ortho Post Op Check    Procedure: L esmer  Surgeon: Oh    Pt comfortable without complaints, pain controlled  Denies CP, SOB, N/V, numbness/tingling     Vital Signs Last 24 Hrs  T(C): 37.1 (12-22-23 @ 00:03), Max: 37.1 (12-22-23 @ 00:03)  T(F): 98.8 (12-22-23 @ 00:03), Max: 98.8 (12-22-23 @ 00:03)  HR: 79 (12-22-23 @ 00:03) (79 - 79)  BP: 108/68 (12-22-23 @ 00:03) (108/68 - 108/68)  BP(mean): --  RR: 18 (12-22-23 @ 00:03) (18 - 18)  SpO2: 97% (12-22-23 @ 00:03) (97% - 97%)  I&O's Summary    21 Dec 2023 07:01  -  22 Dec 2023 04:13  --------------------------------------------------------  IN: 300 mL / OUT: 500 mL / NET: -200 mL        General: Pt Alert and oriented, NAD  DSG C/D/I uri functional  B/L LE: sensation intact, DP 2+, brisk cap refill, EHL/FHL/TA/GS 5/5          Post-op X-Ray: hardware well aligned no fxs    A/P: 63yMale POD#0 s/p L esmer  - Stable  - Pain Control  - DVT ppx: asa 81 bid  - Post op abx: ancef  - PT, WBS: lle wbat  dispo: pend pt eval    Ortho Pager [phone] Ortho Post Op Check    Procedure: L esmer  Surgeon: Oh    Pt comfortable without complaints, pain controlled  Denies CP, SOB, N/V, numbness/tingling     Vital Signs Last 24 Hrs  T(C): 37.1 (12-22-23 @ 00:03), Max: 37.1 (12-22-23 @ 00:03)  T(F): 98.8 (12-22-23 @ 00:03), Max: 98.8 (12-22-23 @ 00:03)  HR: 79 (12-22-23 @ 00:03) (79 - 79)  BP: 108/68 (12-22-23 @ 00:03) (108/68 - 108/68)  BP(mean): --  RR: 18 (12-22-23 @ 00:03) (18 - 18)  SpO2: 97% (12-22-23 @ 00:03) (97% - 97%)  I&O's Summary    21 Dec 2023 07:01  -  22 Dec 2023 04:13  --------------------------------------------------------  IN: 300 mL / OUT: 500 mL / NET: -200 mL        General: Pt Alert and oriented, NAD  DSG C/D/I uri functional  B/L LE: sensation intact, DP 2+, brisk cap refill, EHL/FHL/TA/GS 5/5          Post-op X-Ray: hardware well aligned no fxs    A/P: 63yMale POD#0 s/p L esmer  - Stable  - Pain Control  - DVT ppx: asa 81 bid  - Post op abx: ancef  - PT, WBS: lle wbat  dispo: pend pt eval    Ortho Pager [phone]

## 2023-12-21 NOTE — PATIENT PROFILE ADULT - IS THERE A SUSPICION OF ABUSE/NEGLIGENCE?
Action Requested: Summary for Provider     []  Critical Lab, Recommendations Needed  [x] Need Additional Advice  []   FYI    []   Need Orders  [] Need Medications Sent to Pharmacy  []  Other     SUMMARY: Pt is requesting to see PVR only, please advise if p no

## 2023-12-22 ENCOUNTER — TRANSCRIPTION ENCOUNTER (OUTPATIENT)
Age: 63
End: 2023-12-22

## 2023-12-22 VITALS — OXYGEN SATURATION: 96 % | DIASTOLIC BLOOD PRESSURE: 80 MMHG | SYSTOLIC BLOOD PRESSURE: 169 MMHG | HEART RATE: 74 BPM

## 2023-12-22 LAB
ANION GAP SERPL CALC-SCNC: 9 MMOL/L — SIGNIFICANT CHANGE UP (ref 5–17)
ANION GAP SERPL CALC-SCNC: 9 MMOL/L — SIGNIFICANT CHANGE UP (ref 5–17)
BUN SERPL-MCNC: 25 MG/DL — HIGH (ref 7–23)
BUN SERPL-MCNC: 25 MG/DL — HIGH (ref 7–23)
CALCIUM SERPL-MCNC: 8.7 MG/DL — SIGNIFICANT CHANGE UP (ref 8.4–10.5)
CALCIUM SERPL-MCNC: 8.7 MG/DL — SIGNIFICANT CHANGE UP (ref 8.4–10.5)
CHLORIDE SERPL-SCNC: 99 MMOL/L — SIGNIFICANT CHANGE UP (ref 96–108)
CHLORIDE SERPL-SCNC: 99 MMOL/L — SIGNIFICANT CHANGE UP (ref 96–108)
CO2 SERPL-SCNC: 26 MMOL/L — SIGNIFICANT CHANGE UP (ref 22–31)
CO2 SERPL-SCNC: 26 MMOL/L — SIGNIFICANT CHANGE UP (ref 22–31)
CREAT SERPL-MCNC: 0.97 MG/DL — SIGNIFICANT CHANGE UP (ref 0.5–1.3)
CREAT SERPL-MCNC: 0.97 MG/DL — SIGNIFICANT CHANGE UP (ref 0.5–1.3)
EGFR: 89 ML/MIN/1.73M2 — SIGNIFICANT CHANGE UP
EGFR: 89 ML/MIN/1.73M2 — SIGNIFICANT CHANGE UP
GLUCOSE SERPL-MCNC: 123 MG/DL — HIGH (ref 70–99)
GLUCOSE SERPL-MCNC: 123 MG/DL — HIGH (ref 70–99)
HCT VFR BLD CALC: 37.3 % — LOW (ref 39–50)
HCT VFR BLD CALC: 37.3 % — LOW (ref 39–50)
HGB BLD-MCNC: 12.3 G/DL — LOW (ref 13–17)
HGB BLD-MCNC: 12.3 G/DL — LOW (ref 13–17)
MCHC RBC-ENTMCNC: 32.5 PG — SIGNIFICANT CHANGE UP (ref 27–34)
MCHC RBC-ENTMCNC: 32.5 PG — SIGNIFICANT CHANGE UP (ref 27–34)
MCHC RBC-ENTMCNC: 33 GM/DL — SIGNIFICANT CHANGE UP (ref 32–36)
MCHC RBC-ENTMCNC: 33 GM/DL — SIGNIFICANT CHANGE UP (ref 32–36)
MCV RBC AUTO: 98.7 FL — SIGNIFICANT CHANGE UP (ref 80–100)
MCV RBC AUTO: 98.7 FL — SIGNIFICANT CHANGE UP (ref 80–100)
NRBC # BLD: 0 /100 WBCS — SIGNIFICANT CHANGE UP (ref 0–0)
NRBC # BLD: 0 /100 WBCS — SIGNIFICANT CHANGE UP (ref 0–0)
PLATELET # BLD AUTO: 228 K/UL — SIGNIFICANT CHANGE UP (ref 150–400)
PLATELET # BLD AUTO: 228 K/UL — SIGNIFICANT CHANGE UP (ref 150–400)
POTASSIUM SERPL-MCNC: 4.6 MMOL/L — SIGNIFICANT CHANGE UP (ref 3.5–5.3)
POTASSIUM SERPL-MCNC: 4.6 MMOL/L — SIGNIFICANT CHANGE UP (ref 3.5–5.3)
POTASSIUM SERPL-SCNC: 4.6 MMOL/L — SIGNIFICANT CHANGE UP (ref 3.5–5.3)
POTASSIUM SERPL-SCNC: 4.6 MMOL/L — SIGNIFICANT CHANGE UP (ref 3.5–5.3)
RBC # BLD: 3.78 M/UL — LOW (ref 4.2–5.8)
RBC # BLD: 3.78 M/UL — LOW (ref 4.2–5.8)
RBC # FLD: 13 % — SIGNIFICANT CHANGE UP (ref 10.3–14.5)
RBC # FLD: 13 % — SIGNIFICANT CHANGE UP (ref 10.3–14.5)
SODIUM SERPL-SCNC: 134 MMOL/L — LOW (ref 135–145)
SODIUM SERPL-SCNC: 134 MMOL/L — LOW (ref 135–145)
WBC # BLD: 9.52 K/UL — SIGNIFICANT CHANGE UP (ref 3.8–10.5)
WBC # BLD: 9.52 K/UL — SIGNIFICANT CHANGE UP (ref 3.8–10.5)
WBC # FLD AUTO: 9.52 K/UL — SIGNIFICANT CHANGE UP (ref 3.8–10.5)
WBC # FLD AUTO: 9.52 K/UL — SIGNIFICANT CHANGE UP (ref 3.8–10.5)

## 2023-12-22 PROCEDURE — 76000 FLUOROSCOPY <1 HR PHYS/QHP: CPT

## 2023-12-22 PROCEDURE — 97530 THERAPEUTIC ACTIVITIES: CPT

## 2023-12-22 PROCEDURE — 97116 GAIT TRAINING THERAPY: CPT

## 2023-12-22 PROCEDURE — 99222 1ST HOSP IP/OBS MODERATE 55: CPT

## 2023-12-22 PROCEDURE — 80048 BASIC METABOLIC PNL TOTAL CA: CPT

## 2023-12-22 PROCEDURE — 85027 COMPLETE CBC AUTOMATED: CPT

## 2023-12-22 PROCEDURE — 97161 PT EVAL LOW COMPLEX 20 MIN: CPT

## 2023-12-22 PROCEDURE — C1776: CPT

## 2023-12-22 PROCEDURE — 36415 COLL VENOUS BLD VENIPUNCTURE: CPT

## 2023-12-22 PROCEDURE — C1889: CPT

## 2023-12-22 PROCEDURE — C1713: CPT

## 2023-12-22 RX ORDER — ATORVASTATIN CALCIUM 80 MG/1
1 TABLET, FILM COATED ORAL
Refills: 0 | DISCHARGE

## 2023-12-22 RX ORDER — ASPIRIN/CALCIUM CARB/MAGNESIUM 324 MG
1 TABLET ORAL
Qty: 0 | Refills: 0 | DISCHARGE
Start: 2023-12-22

## 2023-12-22 RX ORDER — OXYCODONE HYDROCHLORIDE 5 MG/1
1 TABLET ORAL
Qty: 0 | Refills: 0 | DISCHARGE
Start: 2023-12-22

## 2023-12-22 RX ORDER — MELOXICAM 15 MG/1
1 TABLET ORAL
Refills: 0 | DISCHARGE

## 2023-12-22 RX ORDER — PANTOPRAZOLE SODIUM 20 MG/1
1 TABLET, DELAYED RELEASE ORAL
Qty: 0 | Refills: 0 | DISCHARGE
Start: 2023-12-22

## 2023-12-22 RX ORDER — CELECOXIB 200 MG/1
1 CAPSULE ORAL
Qty: 0 | Refills: 0 | DISCHARGE
Start: 2023-12-22

## 2023-12-22 RX ORDER — VALACYCLOVIR 500 MG/1
1 TABLET, FILM COATED ORAL
Refills: 0 | DISCHARGE

## 2023-12-22 RX ORDER — ACETAMINOPHEN 500 MG
2 TABLET ORAL
Qty: 0 | Refills: 0 | DISCHARGE
Start: 2023-12-22

## 2023-12-22 RX ADMIN — OXYCODONE HYDROCHLORIDE 10 MILLIGRAM(S): 5 TABLET ORAL at 05:21

## 2023-12-22 RX ADMIN — CELECOXIB 200 MILLIGRAM(S): 200 CAPSULE ORAL at 06:11

## 2023-12-22 RX ADMIN — Medication 15 MILLIGRAM(S): at 12:12

## 2023-12-22 RX ADMIN — Medication 100 MILLIGRAM(S): at 02:55

## 2023-12-22 RX ADMIN — Medication 15 MILLIGRAM(S): at 13:04

## 2023-12-22 RX ADMIN — OXYCODONE HYDROCHLORIDE 10 MILLIGRAM(S): 5 TABLET ORAL at 10:08

## 2023-12-22 RX ADMIN — Medication 15 MILLIGRAM(S): at 05:21

## 2023-12-22 RX ADMIN — Medication 81 MILLIGRAM(S): at 05:20

## 2023-12-22 RX ADMIN — PANTOPRAZOLE SODIUM 40 MILLIGRAM(S): 20 TABLET, DELAYED RELEASE ORAL at 05:21

## 2023-12-22 RX ADMIN — Medication 1 MILLIGRAM(S): at 12:12

## 2023-12-22 RX ADMIN — Medication 1 TABLET(S): at 12:12

## 2023-12-22 RX ADMIN — Medication 5 MILLIGRAM(S): at 02:55

## 2023-12-22 RX ADMIN — OXYCODONE HYDROCHLORIDE 10 MILLIGRAM(S): 5 TABLET ORAL at 06:21

## 2023-12-22 RX ADMIN — OXYCODONE HYDROCHLORIDE 10 MILLIGRAM(S): 5 TABLET ORAL at 00:36

## 2023-12-22 RX ADMIN — Medication 1000 MILLIGRAM(S): at 05:21

## 2023-12-22 RX ADMIN — CELECOXIB 200 MILLIGRAM(S): 200 CAPSULE ORAL at 05:21

## 2023-12-22 RX ADMIN — OXYCODONE HYDROCHLORIDE 10 MILLIGRAM(S): 5 TABLET ORAL at 11:08

## 2023-12-22 NOTE — DISCHARGE NOTE PROVIDER - HOSPITAL COURSE
Admitted 12/21/23  Surgery- left total hip replacement  Sharla-op Antibiotics  Pain control  DVT prophylaxis  OOB/Physical Therapy

## 2023-12-22 NOTE — DISCHARGE NOTE NURSING/CASE MANAGEMENT/SOCIAL WORK - NSDCPEFALRISK_GEN_ALL_CORE
For information on Fall & Injury Prevention, visit: [email or web address] OR  [email or web address] OR  https://www.cdc.gov/steadi/patient.html For information on Fall & Injury Prevention, visit: [email or web address] OR  [email or web address] OR  https://www.cdc.gov/steadi/patient.html

## 2023-12-22 NOTE — CONSULT NOTE ADULT - ASSESSMENT
63M presenting for elective L total hip replacement.     #Post-operative state  - plan per primary team  - being discharged today  - recommend bowel regimen while on pain medication  - dvt ppx per primary team    -continue home medications on discharge.   - very mild hyponatremia, can follow up outpatient    60 minutes spent on this encounter,  including face to face with patient, care coordination and documentation.  Plan of care discussed with orthopedic team.

## 2023-12-22 NOTE — DISCHARGE NOTE NURSING/CASE MANAGEMENT/SOCIAL WORK - PATIENT PORTAL LINK FT
You can access the FollowMyHealth Patient Portal offered by [facility] by registering at the following website: [email or web address]. By joining [employer]’s FollowMyHealth portal, you will also be able to view your health information using other applications (apps) compatible with our system. You can access the FollowMyHealth Patient Portal offered by [facility] by registering at the following website: [email or web address]. By joining [employer]’s FollowMyHealth portal, you will also be able to view your health information using other applications (apps) compatible with our system.

## 2023-12-22 NOTE — PROGRESS NOTE ADULT - SUBJECTIVE AND OBJECTIVE BOX
Ortho Floor Note    Procedure: L esmer  Surgeon: Oh    Comfortable, pain controlled. Found standing at bedside this morning. Plans on leaving today.  Denies CP, SOB, N/V, numbness/tingling     Vital Signs Last 24 Hrs  T(C): 36.6 (22 Dec 2023 09:00), Max: 37.1 (22 Dec 2023 00:03)  T(F): 97.8 (22 Dec 2023 09:00), Max: 98.8 (22 Dec 2023 00:03)  HR: 66 (22 Dec 2023 09:00) (41 - 92)  BP: 127/69 (22 Dec 2023 09:00) (108/68 - 181/75)  BP(mean): 88 (22 Dec 2023 09:00) (80 - 115)  RR: 17 (22 Dec 2023 09:00) (12 - 23)  SpO2: 100% (22 Dec 2023 09:00) (94% - 100%)    Parameters below as of 22 Dec 2023 09:00  Patient On (Oxygen Delivery Method): room air    General: Pt Alert and oriented, NAD  DSG C/D/I uri functional  B/L LE: sensation intact, DP 2+, brisk cap refill, EHL/FHL/TA/GS 5/5    A/P: 63yMale POD#1 s/p L esmer  - Stable  - Pain Control  - DVT ppx: asa 81 bid  - Post op abx: ancef  - PT, WBS: lle wbat  dispo: pend pt eval    Ortho Pager [phone] Ortho Floor Note    Procedure: L esmer  Surgeon: Oh    Comfortable, pain controlled. Found standing at bedside this morning. Plans on leaving today.  Denies CP, SOB, N/V, numbness/tingling     Vital Signs Last 24 Hrs  T(C): 36.6 (22 Dec 2023 09:00), Max: 37.1 (22 Dec 2023 00:03)  T(F): 97.8 (22 Dec 2023 09:00), Max: 98.8 (22 Dec 2023 00:03)  HR: 66 (22 Dec 2023 09:00) (41 - 92)  BP: 127/69 (22 Dec 2023 09:00) (108/68 - 181/75)  BP(mean): 88 (22 Dec 2023 09:00) (80 - 115)  RR: 17 (22 Dec 2023 09:00) (12 - 23)  SpO2: 100% (22 Dec 2023 09:00) (94% - 100%)    Parameters below as of 22 Dec 2023 09:00  Patient On (Oxygen Delivery Method): room air    General: Pt Alert and oriented, NAD  DSG C/D/I uri functional  B/L LE: sensation intact, DP 2+, brisk cap refill, EHL/FHL/TA/GS 5/5    A/P: 63yMale POD#1 s/p L esmer  - Stable  - Pain Control  - DVT ppx: asa 81 bid  - Post op abx: ancef  - PT, WBS: lle wbat  dispo: pend pt eval    Ortho Pager [phone]

## 2023-12-22 NOTE — DISCHARGE NOTE PROVIDER - NSDCCPCAREPLAN_GEN_ALL_CORE_FT
PRINCIPAL DISCHARGE DIAGNOSIS  Diagnosis: Osteoarthritis of left hip  Assessment and Plan of Treatment: left total hip replacement

## 2023-12-22 NOTE — DISCHARGE NOTE PROVIDER - CARE PROVIDER_API CALL
Diallo Hwang  [facility]  [address]  [place] [address]  Phone: [phone]  Fax: [phone]  Follow Up Time: 2 weeks   Diallo Hwang  [facility]  [address]  [place] [address]  Phone: [phone]  Fax: [phone]  Follow Up Time: 2 weeks

## 2023-12-22 NOTE — CONSULT NOTE ADULT - SUBJECTIVE AND OBJECTIVE BOX
See below for ortho HPI  64yo M with left hip pain x  1 year without accident or injury to bring on pain. Pain persists despite conservative measures. Pt notes he worked as a  and was always on his feet until he retired. He takes tylenol for pain. Pt had a right EDUAR with Dr. Hwang 3 years ago without complication. Ambulates with no assistance at baseline.     Presents today for elective left total hip replacement with Dr. Hwang.      Review of Systems:  Review of Systems: MSK: + left hip pain  Other Review of Systems: All other review of systems negative, except as noted in HPI      Allergies and Intolerances:        Allergies:  	morphine: Drug, Other, " my body rejected it"    Home Medications:   * Incomplete Medication History as of 20-Dec-2023 09:41 documented in Structured Notes  · 	nicotine 14 mg/24 hr transdermal film, extended release: Last Dose Taken:  , 1 patch transdermally once a day  · 	primidone: Last Dose Taken:  , 50 milligram(s) orally 2 times a day  	1 tab in am  	2 tabs in PM  · 	valACYclovir 1 g oral tablet: 1 tab(s) orally  · 	atorvastatin 10 mg oral tablet: Last Dose Taken:  , 1 tab(s) orally once a day  · 	tamsulosin 0.4 mg oral capsule: 1 cap(s) orally once a day  · 	sildenafil 100 mg oral tablet: 1 tab(s) orally prn  · 	meloxicam 15 mg oral tablet: Last Dose Taken:  , 1 tab(s) orally once a day  · 	Lexapro 10 mg oral tablet: Last Dose Taken:  , 1 tab(s) orally once a day  · 	tadalafil 20 mg oral tablet: 1 tab(s) orally once a day    Patient History:   Past Medical, Past Surgical, and Family History:  PAST MEDICAL HISTORY:  Essential tremor arms    HLD (hyperlipidemia)     Low back pain     Osteonecrosis right hip.     PAST SURGICAL HISTORY:  History of hip replacement, total, right 2020    Previous back surgery     S/P biopsy back, benign cyst    S/P carpal tunnel release     S/P knee surgery b/l knee ACL repair.    Social History:  · Substance use	No     Tobacco Screening:  · Core Measure Site	No    Vital Signs Last 24 Hrs  T(C): 36.6 (22 Dec 2023 09:00), Max: 37.1 (22 Dec 2023 00:03)  T(F): 97.8 (22 Dec 2023 09:00), Max: 98.8 (22 Dec 2023 00:03)  HR: 66 (22 Dec 2023 09:00) (41 - 92)  BP: 127/69 (22 Dec 2023 09:00) (108/68 - 181/75)  BP(mean): 88 (22 Dec 2023 09:00) (80 - 115)  RR: 17 (22 Dec 2023 09:00) (12 - 23)  SpO2: 100% (22 Dec 2023 09:00) (94% - 100%)    Parameters below as of 22 Dec 2023 09:00  Patient On (Oxygen Delivery Method): room air    Physical exam  General: no acute distress, sitting up in bed  heent: mmm, ncat  cards: rrr, normal s1s2  pulm: ctab, no wheeze  ab: soft, ntnd, normoactive bowel sounds  ext: wwp

## 2023-12-22 NOTE — DISCHARGE NOTE PROVIDER - NSDCFUSCHEDAPPT_GEN_ALL_CORE_FT
Chris Russo  [facility]  [address] [address]  Scheduled Appointment: 01/02/2024    Diallo Hwang  [facility]  ORTHOSURG [address]  Scheduled Appointment: 01/03/2024    Diallo Hwang  [facility]  ORTHOSURG [address]  Scheduled Appointment: 01/05/2024    Chris Russo  [facility]  ORTHOSURG [address]  Scheduled Appointment: 01/12/2024    Diallo Hwang  [facility]  ORTHOSURG [address]  Scheduled Appointment: 02/02/2024     Chris Russo  [facility]  [facility]TH[facility]UR[facility] [address]  Scheduled Appointment: 01/02/2024    Diallo Hwang  [facility]  ORTHOSURG [address]  Scheduled Appointment: 01/03/2024    Diallo Hwang  [facility]  ORTHOSURG [address]  Scheduled Appointment: 01/05/2024    Chris Russo  [facility]  ORTHOSURG [address]  Scheduled Appointment: 01/12/2024    Diallo Hwang  [facility]  ORTHOSURG [address]  Scheduled Appointment: 02/02/2024

## 2023-12-22 NOTE — DISCHARGE NOTE PROVIDER - NSDCMRMEDTOKEN_GEN_ALL_CORE_FT
atorvastatin 10 mg oral tablet: 1 tab(s) orally once a day  Lexapro 10 mg oral tablet: 1 tab(s) orally once a day  meloxicam 15 mg oral tablet: 1 tab(s) orally once a day  primidone: 50 milligram(s) orally 2 times a day  1 tab in am  2 tabs in PM  tamsulosin 0.4 mg oral capsule: 1 cap(s) orally once a day  valACYclovir 1 g oral tablet: 1 tab(s) orally   acetaminophen 500 mg oral tablet: 2 tab(s) orally every 8 hours  aspirin 81 mg oral delayed release tablet: 1 tab(s) orally 2 times a day for 4 weeks after sugery  celecoxib 100 mg oral capsule: 1 cap(s) orally 2 times a day  Lexapro 10 mg oral tablet: 1 tab(s) orally once a day  oxyCODONE 5 mg oral tablet: 1 tab(s) orally every 6 hours as needed for  severe pain  pantoprazole 40 mg oral delayed release tablet: 1 tab(s) orally once a day (before a meal)  primidone: 50 milligram(s) orally 2 times a day  1 tab in am  2 tabs in PM  tamsulosin 0.4 mg oral capsule: 1 cap(s) orally once a day

## 2023-12-22 NOTE — DISCHARGE NOTE PROVIDER - NSDCCPTREATMENT_GEN_ALL_CORE_FT
PRINCIPAL PROCEDURE  Procedure: EDUAR (total hip arthroplasty)  Findings and Treatment: osteoarthritis

## 2023-12-22 NOTE — DISCHARGE NOTE PROVIDER - PROVIDER TOKENS
PROVIDER:[TOKEN:[[id number]],FOLLOWUP:[2 weeks]] PROVIDER:[TOKEN:[[id number]],FOLLOWUP:[2 weeks]]

## 2023-12-22 NOTE — DISCHARGE NOTE PROVIDER - NSDCFUADDINST_GEN_ALL_CORE_FT
Please see Dr. Hwang's separate discharge instructions sheet. Your medications were sent to [facility], located on the first floor of [facility]. Take medications as prescribed.          ACTIVITY:     - Weight bear as tolerated with assistive device. No strenuous activity, heavy lifting, driving or returning to work until cleared by MD.     - Apply a cold compress to the surgical site several times daily to reduce pain and swelling. For icing, twenty-minute sessions followed by an hour off is recommended. You should ice as frequently as possible. Ice should NEVER be placed directly on the skin. Wearing compression stockings during the first week after surgery can help reduce swelling in your knee, calf and foot, but is not required.      (ANTERIOR HIP REPLACEMENTS)     Hip precautions:     · There are no specific range of motion precautions required with this approach to hip replacement.     · A raised toilet seat is not required, although you may find it easier to use one.     · You do not need to sleep with a pillow between your legs.          DRESSING/SHOWERING:     (AQUACEL – brown gel dressing)     - You may shower, your dressing is water-resistant. Do not soak in bathtubs. Remove dressing after postop day 7, then leave incision open to air. You may do this yourself (simply peel it off), or you may ask for assistance from your visiting nurse. Keep your incision clean and dry. Do not pick at your incision. Do not apply creams, ointments or oils to your incision until cleared by your surgeon. Do not soak your incision in sitting water (ie tubs, pools, lakes, etc.) until cleared by your surgeon. Do not scrub the incision – instead, allow soap and water to flow over the incision and then pat it dry with a clean towel.     MEDICATION/ANTICOAGULATION:     -You have been prescribed aspirin, as a preventative to help prevent postoperative blood clots. Please take this medication as prescribed.      - You have been prescribed medications for pain:       - Tylenol for mild to moderate pain. Do not exceed 3,000mg daily.       - For more severe pain, take Tylenol with the addition of narcotic pain medication. Take this medication as prescribed. This medication may cause drowsiness or dizziness. Do not operate machinery. This medication may cause constipation.     - For any additional medications, follow instructions on the bottle.      -Try to have regular bowel movements. Take stool softener or laxative if necessary. You may wish to take Miralax daily until you have regular bowel movements.      - If you have a pain management physician, please follow-up with them postoperatively.      - If you experience any negative side effects of your medications, please call your surgeon's office to discuss.           FOLLOW-UP:     - Call to schedule an appt with Dr. Hwang for follow up.     - Please follow-up with your primary care physician or any other specialist you see postoperatively, if needed.      - Contact your doctor or go to the emergency room if you experience: fever greater than 101.5, chills, chest pain, difficulty breathing, redness or excessive drainage around the incision, other concerns. Please see Dr. Hwang's separate discharge instructions sheet. Your medications were sent to [facility], located on the first floor of [facility]. Take medications as prescribed.          ACTIVITY:     - Weight bear as tolerated with assistive device. No strenuous activity, heavy lifting, driving or returning to work until cleared by MD.     - Apply a cold compress to the surgical site several times daily to reduce pain and swelling. For icing, twenty-minute sessions followed by an hour off is recommended. You should ice as frequently as possible. Ice should NEVER be placed directly on the skin. Wearing compression stockings during the first week after surgery can help reduce swelling in your knee, calf and foot, but is not required.      (ANTERIOR HIP REPLACEMENTS)     Hip precautions:     · There are no specific range of motion precautions required with this approach to hip replacement.     · A raised toilet seat is not required, although you may find it easier to use one.     · You do not need to sleep with a pillow between your legs.          DRESSING/SHOWERING:     (AQUACEL – brown gel dressing)     - You may shower, your dressing is water-resistant. Do not soak in bathtubs. Remove dressing after postop day 7, then leave incision open to air. You may do this yourself (simply peel it off), or you may ask for assistance from your visiting nurse. Keep your incision clean and dry. Do not pick at your incision. Do not apply creams, ointments or oils to your incision until cleared by your surgeon. Do not soak your incision in sitting water (ie tubs, pools, lakes, etc.) until cleared by your surgeon. Do not scrub the incision – instead, allow soap and water to flow over the incision and then pat it dry with a clean towel.     MEDICATION/ANTICOAGULATION:     -You have been prescribed aspirin, as a preventative to help prevent postoperative blood clots. Please take this medication as prescribed.      - You have been prescribed medications for pain:       - Tylenol for mild to moderate pain. Do not exceed 3,000mg daily.       - For more severe pain, take Tylenol with the addition of narcotic pain medication. Take this medication as prescribed. This medication may cause drowsiness or dizziness. Do not operate machinery. This medication may cause constipation.     - For any additional medications, follow instructions on the bottle.      -Try to have regular bowel movements. Take stool softener or laxative if necessary. You may wish to take Miralax daily until you have regular bowel movements.      - If you have a pain management physician, please follow-up with them postoperatively.      - If you experience any negative side effects of your medications, please call your surgeon's office to discuss.           FOLLOW-UP:     - Call to schedule an appt with Dr. Hwang for follow up.     - Please follow-up with your primary care physician or any other specialist you see postoperatively, if needed.      - Contact your doctor or go to the emergency room if you experience: fever greater than 101.5, chills, chest pain, difficulty breathing, redness or excessive drainage around the incision, other concerns. Please see Dr. Hwang's separate discharge instructions sheet. Your medications were sent to [facility], located on the first floor of [facility]. Take medications as prescribed.     Take any medications prescribed to you by your primary care doctor as your did before surgery.       ACTIVITY:     - Weight bear as tolerated with assistive device. No strenuous activity, heavy lifting, driving or returning to work until cleared by MD.     - Apply a cold compress to the surgical site several times daily to reduce pain and swelling. For icing, twenty-minute sessions followed by an hour off is recommended. You should ice as frequently as possible. Ice should NEVER be placed directly on the skin. Wearing compression stockings during the first week after surgery can help reduce swelling in your knee, calf and foot, but is not required.      (ANTERIOR HIP REPLACEMENTS)     Hip precautions:     · There are no specific range of motion precautions required with this approach to hip replacement.     · A raised toilet seat is not required, although you may find it easier to use one.     · You do not need to sleep with a pillow between your legs.          DRESSING/SHOWERING:     (AQUACEL – brown gel dressing)     - You may shower, your dressing is water-resistant. Do not soak in bathtubs. Remove dressing after postop day 7, then leave incision open to air. You may do this yourself (simply peel it off), or you may ask for assistance from your visiting nurse. Keep your incision clean and dry. Do not pick at your incision. Do not apply creams, ointments or oils to your incision until cleared by your surgeon. Do not soak your incision in sitting water (ie tubs, pools, lakes, etc.) until cleared by your surgeon. Do not scrub the incision – instead, allow soap and water to flow over the incision and then pat it dry with a clean towel.     MEDICATION/ANTICOAGULATION:     -You have been prescribed aspirin, as a preventative to help prevent postoperative blood clots. Please take this medication as prescribed.      - You have been prescribed medications for pain:       - Tylenol for mild to moderate pain. Do not exceed 3,000mg daily.       - For more severe pain, take Tylenol with the addition of narcotic pain medication. Take this medication as prescribed. This medication may cause drowsiness or dizziness. Do not operate machinery. This medication may cause constipation.     - For any additional medications, follow instructions on the bottle.      -Try to have regular bowel movements. Take stool softener or laxative if necessary. You may wish to take Miralax daily until you have regular bowel movements.      - If you have a pain management physician, please follow-up with them postoperatively.      - If you experience any negative side effects of your medications, please call your surgeon's office to discuss.           FOLLOW-UP:     - Call to schedule an appt with Dr. Hwang for follow up.     - Please follow-up with your primary care physician or any other specialist you see postoperatively, if needed.      - Contact your doctor or go to the emergency room if you experience: fever greater than 101.5, chills, chest pain, difficulty breathing, redness or excessive drainage around the incision, other concerns. Please see Dr. Hwang's separate discharge instructions sheet. Your medications were sent to [facility], located on the first floor of [facility]. Take medications as prescribed.     Take any medications prescribed to you by your primary care doctor as your did before surgery.       ACTIVITY:     - Weight bear as tolerated with assistive device. No strenuous activity, heavy lifting, driving or returning to work until cleared by MD.     - Apply a cold compress to the surgical site several times daily to reduce pain and swelling. For icing, twenty-minute sessions followed by an hour off is recommended. You should ice as frequently as possible. Ice should NEVER be placed directly on the skin. Wearing compression stockings during the first week after surgery can help reduce swelling in your knee, calf and foot, but is not required.      (ANTERIOR HIP REPLACEMENTS)     Hip precautions:     · There are no specific range of motion precautions required with this approach to hip replacement.     · A raised toilet seat is not required, although you may find it easier to use one.     · You do not need to sleep with a pillow between your legs.          DRESSING/SHOWERING:     (AQUACEL – brown gel dressing)     - You may shower, your dressing is water-resistant. Do not soak in bathtubs. Remove dressing after postop day 7, then leave incision open to air. You may do this yourself (simply peel it off), or you may ask for assistance from your visiting nurse. Keep your incision clean and dry. Do not pick at your incision. Do not apply creams, ointments or oils to your incision until cleared by your surgeon. Do not soak your incision in sitting water (ie tubs, pools, lakes, etc.) until cleared by your surgeon. Do not scrub the incision – instead, allow soap and water to flow over the incision and then pat it dry with a clean towel.     MEDICATION/ANTICOAGULATION:     -You have been prescribed aspirin, as a preventative to help prevent postoperative blood clots. Please take this medication as prescribed.      - You have been prescribed medications for pain:       - Tylenol for mild to moderate pain. Do not exceed 3,000mg daily.       - For more severe pain, take Tylenol with the addition of narcotic pain medication. Take this medication as prescribed. This medication may cause drowsiness or dizziness. Do not operate machinery. This medication may cause constipation.     - For any additional medications, follow instructions on the bottle.      -Try to have regular bowel movements. Take stool softener or laxative if necessary. You may wish to take Miralax daily until you have regular bowel movements.      - If you have a pain management physician, please follow-up with them postoperatively.      - If you experience any negative side effects of your medications, please call your surgeon's office to discuss.           FOLLOW-UP:     - Call to schedule an appt with Dr. Hwang for follow up.     - Please follow-up with your primary care physician or any other specialist you see postoperatively, if needed.      - Contact your doctor or go to the emergency room if you experience: fever greater than 101.5, chills, chest pain, difficulty breathing, redness or excessive drainage around the incision, other concerns. Please see Dr. Hwang's separate discharge instructions sheet. Your medications were sent to [facility], located on the first floor of [facility]. Take medications as prescribed.     Take any medications prescribed to you by your primary care doctor as your did before surgery.       ACTIVITY:     - Weight bear as tolerated with assistive device. No strenuous activity, heavy lifting, driving or returning to work until cleared by MD.     - Apply a cold compress to the surgical site several times daily to reduce pain and swelling. For icing, twenty-minute sessions followed by an hour off is recommended. You should ice as frequently as possible. Ice should NEVER be placed directly on the skin. Wearing compression stockings during the first week after surgery can help reduce swelling in your knee, calf and foot, but is not required.    (ANTERIOR HIP REPLACEMENTS)     Hip precautions:     · There are no specific range of motion precautions required with this approach to hip replacement.     · A raised toilet seat is not required, although you may find it easier to use one.     · You do not need to sleep with a pillow between your legs.          DRESSING/SHOWERING:   (PREVENA or JERED – incisional wound vac)     - You have an incisional wound vac dressing with tubing to attached canister/battery pack. You may shower but must keep battery pack dry at all times. The battery dies in 7 days then can remove dressing and leave open to air. Keep your incision clean and dry. Do not pick at your incision. Do not apply creams, ointments or oils to your incision until cleared by your surgeon. Do not soak your incision in sitting water (ie tubs, pools, lakes, etc.) until cleared by your surgeon. Do not scrub the incision – instead, allow soap and water to flow over the incision and then pat it dry with a clean towel.          MEDICATION/ANTICOAGULATION:     -You have been prescribed aspirin, as a preventative to help prevent postoperative blood clots. Please take this medication as prescribed.      - You have been prescribed medications for pain:       - Tylenol for mild to moderate pain. Do not exceed 3,000mg daily.       - For more severe pain, take Tylenol with the addition of narcotic pain medication. Take this medication as prescribed. This medication may cause drowsiness or dizziness. Do not operate machinery. This medication may cause constipation.     - For any additional medications, follow instructions on the bottle.      -Try to have regular bowel movements. Take stool softener or laxative if necessary. You may wish to take Miralax daily until you have regular bowel movements.      - If you have a pain management physician, please follow-up with them postoperatively.      - If you experience any negative side effects of your medications, please call your surgeon's office to discuss.           FOLLOW-UP:     - Call to schedule an appt with Dr. Hwang for follow up.     - Please follow-up with your primary care physician or any other specialist you see postoperatively, if needed.      - Contact your doctor or go to the emergency room if you experience: fever greater than 101.5, chills, chest pain, difficulty breathing, redness or excessive drainage around the incision, other concerns.           Please see Dr. Hwang's separate discharge instructions sheet. Your medications were sent to [facility], located on the first floor of [facility]. Take medications as prescribed.     Take any medications prescribed to you by your primary care doctor as your did before surgery.       ACTIVITY:     - Weight bear as tolerated with assistive device. No strenuous activity, heavy lifting, driving or returning to work until cleared by MD.     - Apply a cold compress to the surgical site several times daily to reduce pain and swelling. For icing, twenty-minute sessions followed by an hour off is recommended. You should ice as frequently as possible. Ice should NEVER be placed directly on the skin. Wearing compression stockings during the first week after surgery can help reduce swelling in your knee, calf and foot, but is not required.    (ANTERIOR HIP REPLACEMENTS)     Hip precautions:     · There are no specific range of motion precautions required with this approach to hip replacement.     · A raised toilet seat is not required, although you may find it easier to use one.     · You do not need to sleep with a pillow between your legs.          DRESSING/SHOWERING:   (PREVENA or JERED – incisional wound vac)     - You have an incisional wound vac dressing with tubing to attached canister/battery pack. You may shower but must keep battery pack dry at all times. The battery dies in 7 days then can remove dressing and leave open to air. Keep your incision clean and dry. Do not pick at your incision. Do not apply creams, ointments or oils to your incision until cleared by your surgeon. Do not soak your incision in sitting water (ie tubs, pools, lakes, etc.) until cleared by your surgeon. Do not scrub the incision – instead, allow soap and water to flow over the incision and then pat it dry with a clean towel.          MEDICATION/ANTICOAGULATION:     -You have been prescribed aspirin, as a preventative to help prevent postoperative blood clots. Please take this medication as prescribed.      - You have been prescribed medications for pain:       - Tylenol for mild to moderate pain. Do not exceed 3,000mg daily.       - For more severe pain, take Tylenol with the addition of narcotic pain medication. Take this medication as prescribed. This medication may cause drowsiness or dizziness. Do not operate machinery. This medication may cause constipation.     - For any additional medications, follow instructions on the bottle.      -Try to have regular bowel movements. Take stool softener or laxative if necessary. You may wish to take Miralax daily until you have regular bowel movements.      - If you have a pain management physician, please follow-up with them postoperatively.      - If you experience any negative side effects of your medications, please call your surgeon's office to discuss.           FOLLOW-UP:     - Call to schedule an appt with Dr. Hwang for follow up.     - Please follow-up with your primary care physician or any other specialist you see postoperatively, if needed.      - Contact your doctor or go to the emergency room if you experience: fever greater than 101.5, chills, chest pain, difficulty breathing, redness or excessive drainage around the incision, other concerns.

## 2023-12-23 PROBLEM — N40.0 BENIGN PROSTATIC HYPERPLASIA WITHOUT LOWER URINARY TRACT SYMPTOMS: Chronic | Status: ACTIVE | Noted: 2023-12-20

## 2023-12-25 DIAGNOSIS — E87.1 HYPO-OSMOLALITY AND HYPONATREMIA: ICD-10-CM

## 2023-12-25 DIAGNOSIS — F41.9 ANXIETY DISORDER, UNSPECIFIED: ICD-10-CM

## 2023-12-25 DIAGNOSIS — M16.12 UNILATERAL PRIMARY OSTEOARTHRITIS, LEFT HIP: ICD-10-CM

## 2023-12-25 DIAGNOSIS — Z96.641 PRESENCE OF RIGHT ARTIFICIAL HIP JOINT: ICD-10-CM

## 2023-12-25 DIAGNOSIS — E78.5 HYPERLIPIDEMIA, UNSPECIFIED: ICD-10-CM

## 2023-12-25 DIAGNOSIS — N40.0 BENIGN PROSTATIC HYPERPLASIA WITHOUT LOWER URINARY TRACT SYMPTOMS: ICD-10-CM

## 2023-12-28 ENCOUNTER — NON-APPOINTMENT (OUTPATIENT)
Age: 63
End: 2023-12-28

## 2023-12-29 NOTE — ASU PATIENT PROFILE, ADULT - VISION (WITH CORRECTIVE LENSES IF THE PATIENT USUALLY WEARS THEM):
Normal vision: sees adequately in most situations; can see medication labels, newsprint readers prn/Partially impaired: cannot see medication labels or newsprint, but can see obstacles in path, and the surrounding layout; can count fingers at arm's length

## 2023-12-29 NOTE — ASU PATIENT PROFILE, ADULT - NSICDXPASTSURGICALHX_GEN_ALL_CORE_FT
PAST SURGICAL HISTORY:  History of hip replacement, total, right 2020    Previous back surgery LOWER BACK FUSION    S/P biopsy back, benign cyst    S/P carpal tunnel release b/l    S/P knee surgery b/l knee ACL repair

## 2023-12-29 NOTE — ASU PATIENT PROFILE, ADULT - FALL HARM RISK - UNIVERSAL INTERVENTIONS
Bed in lowest position, wheels locked, appropriate side rails in place/Call bell, personal items and telephone in reach/Instruct patient to call for assistance before getting out of bed or chair/Non-slip footwear when patient is out of bed/[place] to call system/Physically safe environment - no spills, clutter or unnecessary equipment/Purposeful Proactive Rounding/Room/bathroom lighting operational, light cord in reach Bed in lowest position, wheels locked, appropriate side rails in place/Call bell, personal items and telephone in reach/Instruct patient to call for assistance before getting out of bed or chair/Non-slip footwear when patient is out of bed/[place] to call system/Physically safe environment - no spills, clutter or unnecessary equipment/Purposeful Proactive Rounding/Room/bathroom lighting operational, light cord in reach

## 2023-12-29 NOTE — ASU PATIENT PROFILE, ADULT - NS TRANSFER PATIENT BELONGINGS
This is a 89-y.o. CF pt. hospitalized for PE, noted to have anhedonia and depressed mood on sertraline 200mg daily. On encounter, Ms Quinteros endorses poor mood and frustration with health, and exhibits an unusually elevated self-regard and difficulty with doctors' boundaries that seem narcissistic in nature. Moreover, she exhibits widely tangential thought process and difficulty recalling circumstances of her hospitalization, concerning for cognitive impairment. Behaviorally calm and endorses no suicide risk.     As stated on first assessment, further collateral is indicated, but Ms Quinteros is reluctant to engage with psychiatry team and so far has not granted permission. MMSE/MoCA useful as well, but require her cooperation. No concern for suicidality from this encounter, enhanced obs not needed.    I have seen and evaluated this patient myself. Chart, labs, meds reviewed. I agree with resident's assessment and plan.
earbuds , wallet/Wrist Watch/Cell Phone/PDA (specify)/Electronic Device (specify)/Clothing

## 2023-12-29 NOTE — ASU PATIENT PROFILE, ADULT - NS PREOP UNDERSTANDS INFO
Spoke  to patient to be NPO/No solid foods after  2200  pm  on Monday night,  allow to drink  till 12Mn,  dress comfortable, , leave all valuable at home non smoking or   alcohol  , no jewelry, no lotions,  leave  all valuable  at home,  bring ID photo and insurance cards,  escort arranged explained to patient needed to have  patient going home safe, , he said , will make arrangement  on day of surgery,, address and telephone given to patient to call as needed./yes

## 2023-12-29 NOTE — ASU PATIENT PROFILE, ADULT - NSICDXPASTMEDICALHX_GEN_ALL_CORE_FT
PAST MEDICAL HISTORY:  Anxiety     BPH (benign prostatic hyperplasia)     Essential tremor arms    HLD (hyperlipidemia)     Low back pain     Osteonecrosis right hip

## 2024-01-01 ENCOUNTER — TRANSCRIPTION ENCOUNTER (OUTPATIENT)
Age: 64
End: 2024-01-01

## 2024-01-01 NOTE — ASU DISCHARGE PLAN (ADULT/PEDIATRIC) - ACCOMPANIED BY
Family
FREE:[LAST:[Iain],FIRST:[Manjit],PHONE:[phone]],FAX:[phone]],ADDRESS:[[id number] [place] [address]]]

## 2024-01-01 NOTE — ASU DISCHARGE PLAN (ADULT/PEDIATRIC) - PATIENT BELONGINGS
INDICATION: Postmenopausal screening for osteoporosis



FINDINGS: This scan is considered osteopenic according to the

World Health Organization guidelines. This indicates an increased

risk of fracture. Treatment is advised. A followup DEXA in one

year is recommended.



Please refer to the detailed Bone Density report faxed separately

from this report.



IMPRESSION: Osteopenia with a slight increased fracture risk.



Dictated by: 



  Dictated on workstation # [id number] Patient's belongings returned

## 2024-01-01 NOTE — ASU DISCHARGE PLAN (ADULT/PEDIATRIC) - NS MD DC FALL RISK RISK
For information on Fall & Injury Prevention, visit: [email or web address]/[email or web address]-[email or web address] OR  [email or web address]://[email or web address]/[email or web address]-[email or web address]-injury OR  https://www.cdc.gov/steadi/patient.html For information on Fall & Injury Prevention, visit: [email or web address]://www.[facility]/[email or web address]-[email or web address]-mobility OR  https://www.[email or web address]/[email or web address]-[email or web address]-[email or web address]-injury OR  https://www.cdc.gov/steadi/patient.html

## 2024-01-02 ENCOUNTER — OUTPATIENT (OUTPATIENT)
Dept: OUTPATIENT SERVICES | Facility: HOSPITAL | Age: 64
LOS: 1 days | Discharge: HOME | End: 2024-01-02
Payer: MEDICAID

## 2024-01-02 ENCOUNTER — APPOINTMENT (OUTPATIENT)
Dept: ORTHOPEDIC SURGERY | Facility: AMBULATORY SURGERY CENTER | Age: 64
End: 2024-01-02

## 2024-01-02 VITALS
HEART RATE: 71 BPM | SYSTOLIC BLOOD PRESSURE: 136 MMHG | RESPIRATION RATE: 15 BRPM | HEIGHT: 70 IN | TEMPERATURE: 97 F | DIASTOLIC BLOOD PRESSURE: 80 MMHG | WEIGHT: 189.6 LBS | OXYGEN SATURATION: 99 %

## 2024-01-02 VITALS
DIASTOLIC BLOOD PRESSURE: 55 MMHG | HEART RATE: 64 BPM | TEMPERATURE: 98 F | SYSTOLIC BLOOD PRESSURE: 129 MMHG | OXYGEN SATURATION: 100 % | RESPIRATION RATE: 16 BRPM

## 2024-01-02 DIAGNOSIS — Z98.890 OTHER SPECIFIED POSTPROCEDURAL STATES: Chronic | ICD-10-CM

## 2024-01-02 DIAGNOSIS — Z96.641 PRESENCE OF RIGHT ARTIFICIAL HIP JOINT: Chronic | ICD-10-CM

## 2024-01-02 PROBLEM — F41.9 ANXIETY DISORDER, UNSPECIFIED: Chronic | Status: ACTIVE | Noted: 2023-12-20

## 2024-01-02 PROBLEM — E78.5 HYPERLIPIDEMIA, UNSPECIFIED: Chronic | Status: ACTIVE | Noted: 2023-12-20

## 2024-01-02 PROCEDURE — 26145 TENDON EXCISION PALM/FINGER: CPT | Mod: 79,F5

## 2024-01-02 RX ORDER — PRIMIDONE 250 MG/1
50 TABLET ORAL
Qty: 0 | Refills: 0 | DISCHARGE

## 2024-01-02 RX ORDER — TAMSULOSIN HYDROCHLORIDE 0.4 MG/1
1 CAPSULE ORAL
Refills: 0 | DISCHARGE

## 2024-01-02 RX ORDER — KETOROLAC TROMETHAMINE 30 MG/ML
30 SYRINGE (ML) INJECTION ONCE
Refills: 0 | Status: DISCONTINUED | OUTPATIENT
Start: 2024-01-02 | End: 2024-01-02

## 2024-01-02 RX ORDER — ESCITALOPRAM OXALATE 10 MG/1
1 TABLET, FILM COATED ORAL
Qty: 0 | Refills: 0 | DISCHARGE

## 2024-01-02 RX ORDER — CHLORHEXIDINE GLUCONATE 213 G/1000ML
1 SOLUTION TOPICAL ONCE
Refills: 0 | Status: COMPLETED | OUTPATIENT
Start: 2024-01-02 | End: 2024-01-02

## 2024-01-02 RX ORDER — ACETAMINOPHEN 500 MG
1000 TABLET ORAL ONCE
Refills: 0 | Status: DISCONTINUED | OUTPATIENT
Start: 2024-01-02 | End: 2024-01-02

## 2024-01-02 RX ADMIN — CHLORHEXIDINE GLUCONATE 1 APPLICATION(S): 213 SOLUTION TOPICAL at 07:10

## 2024-01-02 NOTE — ASU PREOP CHECKLIST - TEMPERATURE IN CELSIUS (DEGREES C)
36.2 Infliximab Counseling:  I discussed with the patient the risks of infliximab including but not limited to myelosuppression, immunosuppression, autoimmune hepatitis, demyelinating diseases, lymphoma, and serious infections.  The patient understands that monitoring is required including a PPD at baseline and must alert us or the primary physician if symptoms of infection or other concerning signs are noted.

## 2024-01-03 ENCOUNTER — APPOINTMENT (OUTPATIENT)
Dept: ORTHOPEDIC SURGERY | Facility: CLINIC | Age: 64
End: 2024-01-03
Payer: MEDICAID

## 2024-01-03 VITALS
WEIGHT: 185 LBS | HEART RATE: 102 BPM | HEIGHT: 70 IN | OXYGEN SATURATION: 99 % | SYSTOLIC BLOOD PRESSURE: 124 MMHG | BODY MASS INDEX: 26.48 KG/M2 | TEMPERATURE: 97.9 F | DIASTOLIC BLOOD PRESSURE: 76 MMHG

## 2024-01-03 PROCEDURE — 99024 POSTOP FOLLOW-UP VISIT: CPT

## 2024-01-05 ENCOUNTER — APPOINTMENT (OUTPATIENT)
Dept: ORTHOPEDIC SURGERY | Facility: CLINIC | Age: 64
End: 2024-01-05

## 2024-01-11 NOTE — HISTORY OF PRESENT ILLNESS
[___ Days Post Op] : post op day #[unfilled] [Procedure: ___] : status post [unfilled] [de-identified] : First post op visit. [de-identified] : Date of Surgery: 12.21.2023 1/3/24 63M presenting now about 2 weeks status post left total hip arthroplasty. Overall doing very well. Pain is relatively mild, rated 2/10 well controlled on tylenol medication. Pt is up and walking very consistently and he's been participating in his own home exercise program. Denies any fever, chills, or drainage from the surgical site itself. Overall pt is going well. [de-identified] : Gait: produces a normal gait with the use of an assistive device  Limb lengths: clinically equal  Left hip: - Focal soft tissue swelling: none - Ecchymosis: none - Erythema: mild at the 1 quarter portion of the surgical site - Wounds: well healed anterior incision, benign appearing, scabbing throughout - Tenderness: none [de-identified] : 63M now about 2 weeks status post left total hip arthroplasty, overall doing very well.  [de-identified] : - Encourage pt to continue Tylenol as needed for pain. - We provided pt with a physical therapy script, a home exercise program, also a short course of separate drugs and medication. - Also advised pt to continue to monitor surgical site for any signs of infection. - Will follow up again in 4 weeks with left hip x-rays. 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

## 2024-01-11 NOTE — END OF VISIT
[FreeTextEntry3] :  All medical record entries made by the Scribe were at my, Dr. Diallo Hwang's, direction and personally dictated by me on 01/03/2024. I have reviewed the chart and agree that the record accurately reflects my personal performance of the history, physical exam, assessment and plan. I have also personally directed, reviewed, and agreed with the chart.

## 2024-01-12 ENCOUNTER — APPOINTMENT (OUTPATIENT)
Dept: ORTHOPEDIC SURGERY | Facility: CLINIC | Age: 64
End: 2024-01-12
Payer: MEDICAID

## 2024-01-12 PROCEDURE — 99024 POSTOP FOLLOW-UP VISIT: CPT

## 2024-01-12 RX ORDER — CEPHALEXIN 500 MG/1
500 CAPSULE ORAL 3 TIMES DAILY
Qty: 21 | Refills: 0 | Status: ACTIVE | COMMUNITY
Start: 2024-01-12 | End: 1900-01-01

## 2024-01-23 ENCOUNTER — APPOINTMENT (OUTPATIENT)
Dept: ORTHOPEDIC SURGERY | Facility: CLINIC | Age: 64
End: 2024-01-23
Payer: MEDICAID

## 2024-01-23 VITALS — WEIGHT: 185 LBS | BODY MASS INDEX: 26.48 KG/M2 | HEIGHT: 70 IN

## 2024-01-23 DIAGNOSIS — M75.42 IMPINGEMENT SYNDROME OF LEFT SHOULDER: ICD-10-CM

## 2024-01-23 DIAGNOSIS — M75.41 IMPINGEMENT SYNDROME OF RIGHT SHOULDER: ICD-10-CM

## 2024-01-23 DIAGNOSIS — M19.011 PRIMARY OSTEOARTHRITIS, RIGHT SHOULDER: ICD-10-CM

## 2024-01-23 PROCEDURE — 99212 OFFICE O/P EST SF 10 MIN: CPT | Mod: 24

## 2024-01-23 RX ORDER — TRAMADOL HYDROCHLORIDE 50 MG/1
50 TABLET, COATED ORAL
Qty: 100 | Refills: 0 | Status: DISCONTINUED | COMMUNITY
Start: 2023-12-12 | End: 2024-01-23

## 2024-01-23 RX ORDER — ACETAMINOPHEN AND CODEINE PHOSPHATE 300; 30 MG/1; MG/1
300-30 TABLET ORAL
Qty: 20 | Refills: 0 | Status: DISCONTINUED | COMMUNITY
Start: 2024-01-01 | End: 2024-01-23

## 2024-01-23 RX ORDER — OXYCODONE 5 MG/1
5 TABLET ORAL
Qty: 50 | Refills: 0 | Status: DISCONTINUED | COMMUNITY
Start: 2023-12-18 | End: 2024-01-23

## 2024-01-23 RX ORDER — CEFADROXIL 500 MG/1
500 CAPSULE ORAL
Qty: 10 | Refills: 0 | Status: DISCONTINUED | COMMUNITY
Start: 2024-01-03 | End: 2024-01-23

## 2024-01-23 RX ORDER — MELOXICAM 15 MG/1
15 TABLET ORAL
Qty: 30 | Refills: 0 | Status: DISCONTINUED | COMMUNITY
Start: 2023-11-14 | End: 2024-01-23

## 2024-01-23 RX ORDER — CELECOXIB 100 MG/1
100 CAPSULE ORAL
Qty: 60 | Refills: 1 | Status: DISCONTINUED | COMMUNITY
Start: 2023-12-21 | End: 2024-01-23

## 2024-01-23 RX ORDER — TRAMADOL HYDROCHLORIDE 50 MG/1
50 TABLET, COATED ORAL
Qty: 21 | Refills: 0 | Status: DISCONTINUED | COMMUNITY
Start: 2023-12-12 | End: 2024-01-23

## 2024-01-23 RX ORDER — ACETAMINOPHEN 500 MG/1
500 TABLET ORAL
Qty: 180 | Refills: 1 | Status: DISCONTINUED | COMMUNITY
Start: 2023-12-18 | End: 2024-01-23

## 2024-01-23 RX ORDER — PANTOPRAZOLE 40 MG/1
40 TABLET, DELAYED RELEASE ORAL DAILY
Qty: 30 | Refills: 2 | Status: DISCONTINUED | COMMUNITY
Start: 2023-12-18 | End: 2024-01-23

## 2024-01-23 RX ORDER — MELOXICAM 15 MG/1
15 TABLET ORAL
Qty: 30 | Refills: 1 | Status: ACTIVE | COMMUNITY
Start: 2024-01-23 | End: 1900-01-01

## 2024-01-23 RX ORDER — ASPIRIN ENTERIC COATED TABLETS 81 MG 81 MG/1
81 TABLET, DELAYED RELEASE ORAL
Qty: 60 | Refills: 0 | Status: DISCONTINUED | COMMUNITY
Start: 2023-12-21 | End: 2024-01-23

## 2024-01-25 ENCOUNTER — NON-APPOINTMENT (OUTPATIENT)
Age: 64
End: 2024-01-25

## 2024-01-25 ENCOUNTER — APPOINTMENT (OUTPATIENT)
Dept: DERMATOLOGY | Facility: CLINIC | Age: 64
End: 2024-01-25
Payer: MEDICAID

## 2024-01-25 PROCEDURE — 99024 POSTOP FOLLOW-UP VISIT: CPT

## 2024-01-29 ENCOUNTER — APPOINTMENT (OUTPATIENT)
Dept: ORTHOPEDIC SURGERY | Facility: CLINIC | Age: 64
End: 2024-01-29

## 2024-01-31 ENCOUNTER — APPOINTMENT (OUTPATIENT)
Dept: ORTHOPEDIC SURGERY | Facility: CLINIC | Age: 64
End: 2024-01-31
Payer: MEDICAID

## 2024-01-31 ENCOUNTER — RESULT REVIEW (OUTPATIENT)
Age: 64
End: 2024-01-31

## 2024-01-31 ENCOUNTER — OUTPATIENT (OUTPATIENT)
Dept: OUTPATIENT SERVICES | Facility: HOSPITAL | Age: 64
LOS: 1 days | End: 2024-01-31
Payer: COMMERCIAL

## 2024-01-31 VITALS
BODY MASS INDEX: 26.48 KG/M2 | DIASTOLIC BLOOD PRESSURE: 78 MMHG | OXYGEN SATURATION: 98 % | WEIGHT: 185 LBS | SYSTOLIC BLOOD PRESSURE: 146 MMHG | HEIGHT: 70 IN | HEART RATE: 76 BPM

## 2024-01-31 DIAGNOSIS — Z98.890 OTHER SPECIFIED POSTPROCEDURAL STATES: Chronic | ICD-10-CM

## 2024-01-31 DIAGNOSIS — Z47.1 AFTERCARE FOLLOWING JOINT REPLACEMENT SURGERY: ICD-10-CM

## 2024-01-31 DIAGNOSIS — Z96.641 PRESENCE OF RIGHT ARTIFICIAL HIP JOINT: Chronic | ICD-10-CM

## 2024-01-31 DIAGNOSIS — Z96.642 AFTERCARE FOLLOWING JOINT REPLACEMENT SURGERY: ICD-10-CM

## 2024-01-31 PROCEDURE — 73502 X-RAY EXAM HIP UNI 2-3 VIEWS: CPT | Mod: 26,LT

## 2024-01-31 PROCEDURE — 73502 X-RAY EXAM HIP UNI 2-3 VIEWS: CPT

## 2024-01-31 PROCEDURE — 99024 POSTOP FOLLOW-UP VISIT: CPT

## 2024-02-02 ENCOUNTER — APPOINTMENT (OUTPATIENT)
Dept: ORTHOPEDIC SURGERY | Facility: CLINIC | Age: 64
End: 2024-02-02

## 2024-02-09 ENCOUNTER — APPOINTMENT (OUTPATIENT)
Dept: ORTHOPEDIC SURGERY | Facility: CLINIC | Age: 64
End: 2024-02-09
Payer: MEDICAID

## 2024-02-09 PROBLEM — Z47.1 AFTERCARE FOLLOWING LEFT HIP JOINT REPLACEMENT SURGERY: Status: ACTIVE | Noted: 2023-12-18

## 2024-02-09 PROCEDURE — 99024 POSTOP FOLLOW-UP VISIT: CPT

## 2024-02-09 NOTE — PHYSICAL EXAM
[de-identified] : General appearance: well nourished and hydrated, pleasant, alert and oriented x 3, cooperative.   HEENT: normocephalic, EOM intact, wearing mask, external auditory canal clear.   Cardiovascular: no lower leg edema, no varicosities, dorsalis pedis pulses palpable and symmetric.   Lymphatics: no palpable lymphadenopathy, no lymphedema.   Neurologic: sensation is normal, no muscle weakness in upper or lower extremities, patella tendon reflexes present and symmetric.   Dermatologic: skin moist, warm, no rash.   Spine: cervical spine with normal lordosis and painless range of motion, thoracic spine with normal kyphosis and painless range of motion, lumbosacral spine with normal lordosis and painless range of motion.  No tenderness to palpation along midline spine and paraspinal musculature.  Sacroiliac joints nontender bilaterally. Negative SLR and crossed SLR tests bilaterally. Gait: Normal, presents without the use of an assistive device.  Limb lengths: clinically equal  Left hip: - Focal soft tissue swelling: none - Ecchymosis: none - Erythema: none - Wounds: well-approximated anterior incision with minimal periincisional dry scabbing at the far superior and furthest base, benign appearing - Tenderness: no lateral trochanteric tenderness to palpation, does have anterior groin tenderness to palpation. - ROM:    - Flexion: 95   - Extension: 0   - Adduction: 15   - Abduction: 45   - Internal rotation in 90 degrees of hip flexion: 20   - External rotation in 90 degrees of hip flexion: 30 - ANAM: negative - FADIR: negative - Maria Luisa: negative - Stinchfield: positive - Flexor power: 4+/5 - Abductor power: 5/5 [de-identified] : Imaging findings today, b/l hip x-rays were taken today, January 31st, 2024 at [facility];These images demonstrate a stable position of his bilateral total hip arthroplasty compared to prior imaging, with no evidence of mechanical complications.

## 2024-02-09 NOTE — DISCUSSION/SUMMARY
[de-identified] : Imp: 63 year old now approximately 6 weeks post-Op L EDUAR with previously implanted and well functioning R EDUAR, overall doing  very well at this time.  - I encouraged him to continue with physical therapy and home exercise program. - Tylenol is needed for pain. - We reviewed that the primary emphasis at this point should be on bilateral hip flexor strengthening. - He will follow up in 2 months with repeat bilateral hip x-rays earlier as needed for any new or worsening symptoms.
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
## 2024-02-09 NOTE — END OF VISIT
[FreeTextEntry3] :  All medical record entries made by the Scribe were at my, Dr. Diallo Hwang, direction and personally dictated by me on 01/31/2024. I have reviewed the chart and agree that the record accurately reflects my personal performance of the history, physical exam, assessment and plan. I have also personally directed, reviewed, and agreed with the chart.

## 2024-02-11 ENCOUNTER — TRANSCRIPTION ENCOUNTER (OUTPATIENT)
Age: 64
End: 2024-02-11

## 2024-02-12 ENCOUNTER — INPATIENT (INPATIENT)
Facility: HOSPITAL | Age: 64
LOS: 4 days | Discharge: HOME | DRG: 858 | End: 2024-02-17
Attending: STUDENT IN AN ORGANIZED HEALTH CARE EDUCATION/TRAINING PROGRAM | Admitting: STUDENT IN AN ORGANIZED HEALTH CARE EDUCATION/TRAINING PROGRAM
Payer: COMMERCIAL

## 2024-02-12 VITALS
RESPIRATION RATE: 18 BRPM | TEMPERATURE: 98 F | HEART RATE: 73 BPM | SYSTOLIC BLOOD PRESSURE: 136 MMHG | DIASTOLIC BLOOD PRESSURE: 75 MMHG | OXYGEN SATURATION: 98 %

## 2024-02-12 DIAGNOSIS — Z96.641 PRESENCE OF RIGHT ARTIFICIAL HIP JOINT: Chronic | ICD-10-CM

## 2024-02-12 DIAGNOSIS — Z98.890 OTHER SPECIFIED POSTPROCEDURAL STATES: Chronic | ICD-10-CM

## 2024-02-12 DIAGNOSIS — G25.0 ESSENTIAL TREMOR: ICD-10-CM

## 2024-02-12 DIAGNOSIS — N40.0 BENIGN PROSTATIC HYPERPLASIA WITHOUT LOWER URINARY TRACT SYMPTOMS: ICD-10-CM

## 2024-02-12 DIAGNOSIS — E78.5 HYPERLIPIDEMIA, UNSPECIFIED: ICD-10-CM

## 2024-02-12 DIAGNOSIS — T81.49XA INFECTION FOLLOWING A PROCEDURE, OTHER SURGICAL SITE, INITIAL ENCOUNTER: ICD-10-CM

## 2024-02-12 DIAGNOSIS — F41.9 ANXIETY DISORDER, UNSPECIFIED: ICD-10-CM

## 2024-02-12 LAB
ANION GAP SERPL CALC-SCNC: 11 MMOL/L — SIGNIFICANT CHANGE UP (ref 5–17)
APTT BLD: 28.3 SEC — SIGNIFICANT CHANGE UP (ref 24.5–35.6)
BASOPHILS # BLD AUTO: 0.06 K/UL — SIGNIFICANT CHANGE UP (ref 0–0.2)
BASOPHILS NFR BLD AUTO: 1.2 % — SIGNIFICANT CHANGE UP (ref 0–2)
BLD GP AB SCN SERPL QL: NEGATIVE — SIGNIFICANT CHANGE UP
BUN SERPL-MCNC: 15 MG/DL — SIGNIFICANT CHANGE UP (ref 7–23)
CALCIUM SERPL-MCNC: 9.7 MG/DL — SIGNIFICANT CHANGE UP (ref 8.4–10.5)
CHLORIDE SERPL-SCNC: 102 MMOL/L — SIGNIFICANT CHANGE UP (ref 96–108)
CO2 SERPL-SCNC: 27 MMOL/L — SIGNIFICANT CHANGE UP (ref 22–31)
CREAT SERPL-MCNC: 0.79 MG/DL — SIGNIFICANT CHANGE UP (ref 0.5–1.3)
CRP SERPL-MCNC: <3 MG/L — SIGNIFICANT CHANGE UP (ref 0–4)
EGFR: 101 ML/MIN/1.73M2 — SIGNIFICANT CHANGE UP
EOSINOPHIL # BLD AUTO: 0.1 K/UL — SIGNIFICANT CHANGE UP (ref 0–0.5)
EOSINOPHIL NFR BLD AUTO: 2 % — SIGNIFICANT CHANGE UP (ref 0–6)
ERYTHROCYTE [SEDIMENTATION RATE] IN BLOOD: 9 MM/HR — SIGNIFICANT CHANGE UP
GLUCOSE SERPL-MCNC: 97 MG/DL — SIGNIFICANT CHANGE UP (ref 70–99)
HCT VFR BLD CALC: 43.7 % — SIGNIFICANT CHANGE UP (ref 39–50)
HGB BLD-MCNC: 14 G/DL — SIGNIFICANT CHANGE UP (ref 13–17)
IMM GRANULOCYTES NFR BLD AUTO: 0.4 % — SIGNIFICANT CHANGE UP (ref 0–0.9)
INR BLD: 1 — SIGNIFICANT CHANGE UP (ref 0.85–1.18)
LYMPHOCYTES # BLD AUTO: 1.31 K/UL — SIGNIFICANT CHANGE UP (ref 1–3.3)
LYMPHOCYTES # BLD AUTO: 26.7 % — SIGNIFICANT CHANGE UP (ref 13–44)
MCHC RBC-ENTMCNC: 32 GM/DL — SIGNIFICANT CHANGE UP (ref 32–36)
MCHC RBC-ENTMCNC: 32.4 PG — SIGNIFICANT CHANGE UP (ref 27–34)
MCV RBC AUTO: 101.2 FL — HIGH (ref 80–100)
MONOCYTES # BLD AUTO: 0.46 K/UL — SIGNIFICANT CHANGE UP (ref 0–0.9)
MONOCYTES NFR BLD AUTO: 9.4 % — SIGNIFICANT CHANGE UP (ref 2–14)
NEUTROPHILS # BLD AUTO: 2.95 K/UL — SIGNIFICANT CHANGE UP (ref 1.8–7.4)
NEUTROPHILS NFR BLD AUTO: 60.3 % — SIGNIFICANT CHANGE UP (ref 43–77)
NRBC # BLD: 0 /100 WBCS — SIGNIFICANT CHANGE UP (ref 0–0)
PLATELET # BLD AUTO: 294 K/UL — SIGNIFICANT CHANGE UP (ref 150–400)
POTASSIUM SERPL-MCNC: 4.7 MMOL/L — SIGNIFICANT CHANGE UP (ref 3.5–5.3)
POTASSIUM SERPL-SCNC: 4.7 MMOL/L — SIGNIFICANT CHANGE UP (ref 3.5–5.3)
PROTHROM AB SERPL-ACNC: 11.4 SEC — SIGNIFICANT CHANGE UP (ref 9.5–13)
RBC # BLD: 4.32 M/UL — SIGNIFICANT CHANGE UP (ref 4.2–5.8)
RBC # FLD: 12.9 % — SIGNIFICANT CHANGE UP (ref 10.3–14.5)
RH IG SCN BLD-IMP: POSITIVE — SIGNIFICANT CHANGE UP
SODIUM SERPL-SCNC: 140 MMOL/L — SIGNIFICANT CHANGE UP (ref 135–145)
WBC # BLD: 4.9 K/UL — SIGNIFICANT CHANGE UP (ref 3.8–10.5)
WBC # FLD AUTO: 4.9 K/UL — SIGNIFICANT CHANGE UP (ref 3.8–10.5)

## 2024-02-12 PROCEDURE — 25115 REMOVE WRIST/FOREARM LESION: CPT | Mod: 78,RT

## 2024-02-12 PROCEDURE — 99285 EMERGENCY DEPT VISIT HI MDM: CPT

## 2024-02-12 PROCEDURE — 73130 X-RAY EXAM OF HAND: CPT | Mod: 26,RT

## 2024-02-12 PROCEDURE — 26145 TENDON EXCISION PALM/FINGER: CPT | Mod: 78,F5

## 2024-02-12 RX ORDER — SENNA PLUS 8.6 MG/1
2 TABLET ORAL AT BEDTIME
Refills: 0 | Status: DISCONTINUED | OUTPATIENT
Start: 2024-02-12 | End: 2024-02-17

## 2024-02-12 RX ORDER — ESCITALOPRAM OXALATE 10 MG/1
1 TABLET, FILM COATED ORAL
Refills: 0 | DISCHARGE

## 2024-02-12 RX ORDER — ESCITALOPRAM OXALATE 10 MG/1
20 TABLET, FILM COATED ORAL AT BEDTIME
Refills: 0 | Status: DISCONTINUED | OUTPATIENT
Start: 2024-02-12 | End: 2024-02-17

## 2024-02-12 RX ORDER — HYDROMORPHONE HYDROCHLORIDE 2 MG/ML
0.5 INJECTION INTRAMUSCULAR; INTRAVENOUS; SUBCUTANEOUS
Refills: 0 | Status: DISCONTINUED | OUTPATIENT
Start: 2024-02-12 | End: 2024-02-12

## 2024-02-12 RX ORDER — OXYCODONE HYDROCHLORIDE 5 MG/1
5 TABLET ORAL EVERY 4 HOURS
Refills: 0 | Status: DISCONTINUED | OUTPATIENT
Start: 2024-02-12 | End: 2024-02-17

## 2024-02-12 RX ORDER — PIPERACILLIN AND TAZOBACTAM 4; .5 G/20ML; G/20ML
3.38 INJECTION, POWDER, LYOPHILIZED, FOR SOLUTION INTRAVENOUS ONCE
Refills: 0 | Status: COMPLETED | OUTPATIENT
Start: 2024-02-13 | End: 2024-02-13

## 2024-02-12 RX ORDER — OXYCODONE HYDROCHLORIDE 5 MG/1
5 TABLET ORAL EVERY 4 HOURS
Refills: 0 | Status: DISCONTINUED | OUTPATIENT
Start: 2024-02-12 | End: 2024-02-12

## 2024-02-12 RX ORDER — VANCOMYCIN HCL 1 G
1250 VIAL (EA) INTRAVENOUS EVERY 12 HOURS
Refills: 0 | Status: DISCONTINUED | OUTPATIENT
Start: 2024-02-12 | End: 2024-02-16

## 2024-02-12 RX ORDER — ACETAMINOPHEN 500 MG
1000 TABLET ORAL EVERY 8 HOURS
Refills: 0 | Status: DISCONTINUED | OUTPATIENT
Start: 2024-02-12 | End: 2024-02-17

## 2024-02-12 RX ORDER — PIPERACILLIN AND TAZOBACTAM 4; .5 G/20ML; G/20ML
3.38 INJECTION, POWDER, LYOPHILIZED, FOR SOLUTION INTRAVENOUS EVERY 8 HOURS
Refills: 0 | Status: DISCONTINUED | OUTPATIENT
Start: 2024-02-13 | End: 2024-02-13

## 2024-02-12 RX ORDER — HYDROMORPHONE HYDROCHLORIDE 2 MG/ML
0.5 INJECTION INTRAMUSCULAR; INTRAVENOUS; SUBCUTANEOUS
Refills: 0 | Status: DISCONTINUED | OUTPATIENT
Start: 2024-02-12 | End: 2024-02-17

## 2024-02-12 RX ORDER — CHLORHEXIDINE GLUCONATE 213 G/1000ML
1 SOLUTION TOPICAL ONCE
Refills: 0 | Status: DISCONTINUED | OUTPATIENT
Start: 2024-02-12 | End: 2024-02-17

## 2024-02-12 RX ORDER — POLYETHYLENE GLYCOL 3350 17 G/17G
17 POWDER, FOR SOLUTION ORAL AT BEDTIME
Refills: 0 | Status: DISCONTINUED | OUTPATIENT
Start: 2024-02-12 | End: 2024-02-17

## 2024-02-12 RX ORDER — SODIUM CHLORIDE 9 MG/ML
1000 INJECTION, SOLUTION INTRAVENOUS
Refills: 0 | Status: DISCONTINUED | OUTPATIENT
Start: 2024-02-12 | End: 2024-02-12

## 2024-02-12 RX ORDER — PRIMIDONE 250 MG/1
30 TABLET ORAL AT BEDTIME
Refills: 0 | Status: DISCONTINUED | OUTPATIENT
Start: 2024-02-12 | End: 2024-02-12

## 2024-02-12 RX ORDER — PIPERACILLIN AND TAZOBACTAM 4; .5 G/20ML; G/20ML
3.38 INJECTION, POWDER, LYOPHILIZED, FOR SOLUTION INTRAVENOUS ONCE
Refills: 0 | Status: DISCONTINUED | OUTPATIENT
Start: 2024-02-13 | End: 2024-02-13

## 2024-02-12 RX ORDER — PROCHLORPERAZINE MALEATE 5 MG
5 TABLET ORAL ONCE
Refills: 0 | Status: DISCONTINUED | OUTPATIENT
Start: 2024-02-12 | End: 2024-02-17

## 2024-02-12 RX ORDER — PANTOPRAZOLE SODIUM 20 MG/1
40 TABLET, DELAYED RELEASE ORAL
Refills: 0 | Status: DISCONTINUED | OUTPATIENT
Start: 2024-02-12 | End: 2024-02-17

## 2024-02-12 RX ORDER — KETOROLAC TROMETHAMINE 30 MG/ML
15 SYRINGE (ML) INJECTION EVERY 6 HOURS
Refills: 0 | Status: DISCONTINUED | OUTPATIENT
Start: 2024-02-12 | End: 2024-02-17

## 2024-02-12 RX ORDER — PRIMIDONE 250 MG/1
50 TABLET ORAL EVERY 12 HOURS
Refills: 0 | Status: DISCONTINUED | OUTPATIENT
Start: 2024-02-12 | End: 2024-02-13

## 2024-02-12 RX ORDER — PIPERACILLIN AND TAZOBACTAM 4; .5 G/20ML; G/20ML
3.38 INJECTION, POWDER, LYOPHILIZED, FOR SOLUTION INTRAVENOUS ONCE
Refills: 0 | Status: COMPLETED | OUTPATIENT
Start: 2024-02-12 | End: 2024-02-12

## 2024-02-12 RX ORDER — POVIDONE-IODINE 5 %
1 AEROSOL (ML) TOPICAL ONCE
Refills: 0 | Status: DISCONTINUED | OUTPATIENT
Start: 2024-02-12 | End: 2024-02-17

## 2024-02-12 RX ADMIN — POLYETHYLENE GLYCOL 3350 17 GRAM(S): 17 POWDER, FOR SOLUTION ORAL at 23:42

## 2024-02-12 RX ADMIN — HYDROMORPHONE HYDROCHLORIDE 0.5 MILLIGRAM(S): 2 INJECTION INTRAMUSCULAR; INTRAVENOUS; SUBCUTANEOUS at 21:39

## 2024-02-12 RX ADMIN — ESCITALOPRAM OXALATE 20 MILLIGRAM(S): 10 TABLET, FILM COATED ORAL at 23:41

## 2024-02-12 RX ADMIN — SODIUM CHLORIDE 100 MILLILITER(S): 9 INJECTION, SOLUTION INTRAVENOUS at 19:01

## 2024-02-12 RX ADMIN — HYDROMORPHONE HYDROCHLORIDE 0.5 MILLIGRAM(S): 2 INJECTION INTRAMUSCULAR; INTRAVENOUS; SUBCUTANEOUS at 22:00

## 2024-02-12 RX ADMIN — HYDROMORPHONE HYDROCHLORIDE 0.5 MILLIGRAM(S): 2 INJECTION INTRAMUSCULAR; INTRAVENOUS; SUBCUTANEOUS at 22:22

## 2024-02-12 RX ADMIN — Medication 15 MILLIGRAM(S): at 23:41

## 2024-02-12 RX ADMIN — PIPERACILLIN AND TAZOBACTAM 200 GRAM(S): 4; .5 INJECTION, POWDER, LYOPHILIZED, FOR SOLUTION INTRAVENOUS at 21:46

## 2024-02-12 RX ADMIN — SENNA PLUS 2 TABLET(S): 8.6 TABLET ORAL at 22:27

## 2024-02-12 RX ADMIN — HYDROMORPHONE HYDROCHLORIDE 0.5 MILLIGRAM(S): 2 INJECTION INTRAMUSCULAR; INTRAVENOUS; SUBCUTANEOUS at 22:28

## 2024-02-12 RX ADMIN — HYDROMORPHONE HYDROCHLORIDE 0.5 MILLIGRAM(S): 2 INJECTION INTRAMUSCULAR; INTRAVENOUS; SUBCUTANEOUS at 21:55

## 2024-02-12 RX ADMIN — PRIMIDONE 50 MILLIGRAM(S): 250 TABLET ORAL at 23:41

## 2024-02-12 NOTE — PATIENT PROFILE ADULT - FUNCTIONAL ASSESSMENT - DAILY ACTIVITY 2.
Telephone Encounter by Jacqui Romero CMA at 05/16/18 04:51 PM     Author:  Jacqui Romero CMA Service:  (none) Author Type:  Certified Medical Assistant     Filed:  05/16/18 04:51 PM Encounter Date:  5/16/2018 Status:  Signed     :  Jacqui Romero CMA (Certified Medical Assistant)       From: Justen Koroma  To: Momo Gaxiola PA  Sent: 5/16/2018  4:44 PM CDT  Subject: Medication Questions    So my understanding from [employer] was that the doctor submits something   to the insurance company regarding the necessity of the medication. Is   that what's being denied? Or is it a standard denial until this form from   the doctor has been submitted?   In other words, is there still a chance or is it a no-go?    Elmer Allen       Revision History        Date/Time User Provider Type Action    > 05/16/18 04:51 PM Jacqui Romero CMA Certified Medical Assistant Sign    Attribution information within the note text is not available.            
4 = No assist / stand by assistance

## 2024-02-12 NOTE — H&P ADULT - NSHPPHYSICALEXAM_GEN_ALL_CORE
VS: stable, reviewed per nursing documentation  General: No acute distress, resting comfortably  Neuro: Alert, oriented x 3  Skin: 1cm wound dehiscence, Warm, dry, no rash, no ecchymosis   MSK: atraumatic with exception of below  RUE     -Inspection/palpation: 1 cm wound dehiscence, erythema, + tenderness to palpation over the volar aspect of R 1st finger MCP , + mild swelling over the R 1st finger MCP,      -Compartments: soft, nontender bilaterally     -ROM: AROM / PROM of RUE    -Motor: triceps/biceps/ strength 5/5 bilateral upper extremities     -Sensation: intact to light touch bilateral upper extremities    -Pulses: 2+ radial pulses bilaterally, symmetric; extremities warm and well perfused, capillary refill brisk

## 2024-02-12 NOTE — H&P ADULT - HISTORY OF PRESENT ILLNESS
63M with PMHx essential tremors, anxiety and HLD s/p right trigger thumb release on 1/2/24 c/b wound dehiscence and infection. He said that the first week after surgery he started to have some tenderness,  63M with PMHx essential tremors, anxiety and HLD s/p right trigger thumb release on 1/2/24 c/b wound dehiscence and infection sent in by Dr. Russo for I&D and IV antibiotics today. He said that the first week after surgery he started to have some tenderness and redness of his surgical incision. He says that he went back to work as a  1 week after surgery where he was using his hands a lot and endorse the site getting wet/dirty even with the gloves he wore. He denies any numbness or tingling of the right upper extremity. He has been taking oral antibiotics but is unable to identify which specifically. He say last saw Dr. Russo on Friday who told him he would need surgery to wash out the infection and be admitted to the hospital for IV antibiotics.     He denies any fevers, chills, shortness of breath, chest pain, nausea, vomiting, recent travel, recent illness.  63M with PMHx essential tremors, anxiety and HLD s/p right trigger thumb release on 1/2/24 c/b surgical site infection sent in by Dr. Russo for I&D and IV antibiotics today. He said that the first week after surgery he started to have some tenderness and redness of his surgical incision. He says that he went back to work as a  1 week after surgery where he was using his hands a lot and endorse the site getting wet/dirty even with the gloves he wore. He denies any numbness, tingling or weakness of the right upper extremity. He has been taking oral antibiotics but is unable to identify which specifically. He said he saw Dr. Russo on Friday who told him he would need surgery to wash out the infection and be admitted to the hospital for IV antibiotics.     He denies any fevers, chills, shortness of breath, chest pain, nausea, vomiting, recent travel, recent illness.

## 2024-02-12 NOTE — H&P ADULT - REASON FOR ADMISSION
s/p right trigger thumb release c/b wound dehiscence/ infection s/p right trigger thumb release c/b surgical site infection

## 2024-02-12 NOTE — ED ADULT TRIAGE NOTE - CHIEF COMPLAINT QUOTE
Pt presents to ED C/O R hand infection x 1 month. Sent by MD Curry for possible admit as per pt. Pt states, " They did a swab and found a fungal infection". Denies fevers. Pt presents to ED C/O R hand infection x 1 month. Sent by MD Curry for possible IV antibiotics or admit as per pt. Pt states, " They did a swab weeks ago and found a fungal infection". Denies fevers.

## 2024-02-12 NOTE — PROGRESS NOTE ADULT - SUBJECTIVE AND OBJECTIVE BOX
Ortho Post Op Check    Procedure: R thumb i/d   Surgeon: ronda     Pt comfortable without complaints, pain controlled  Denies CP, SOB, N/V, numbness/tingling     Vital Signs Last 24 Hrs  T(C): 36.4 (02-12-24 @ 23:25), Max: 36.5 (02-12-24 @ 17:45)  T(F): 97.6 (02-12-24 @ 23:25), Max: 97.7 (02-12-24 @ 17:45)  HR: 80 (02-12-24 @ 23:25) (56 - 80)  BP: 151/74 (02-12-24 @ 23:25) (149/78 - 210/96)  BP(mean): 113 (02-12-24 @ 22:51) (113 - 138)  RR: 18 (02-12-24 @ 23:25) (11 - 19)  SpO2: 96% (02-12-24 @ 23:25) (94% - 99%)    General: Pt Alert and oriented, NAD  R hand elev in rodriguez block   Pulses: unable to assess, thumb and fingers wwp, <2 sec cap refill   Sensation: silt r/u distribution of R thumb and symmetric to L thumb, silt m/u/r distribution   Motor: he is able to wiggle his R thumb volar/dorsal but limited 2/2 pain, FDP/extensor mechanism for digits 2-5 intact         A/P: 63yMale s/p R thumb/carpal tunnel/parona space i/d  by Dr. ELIZABET Russo on 02-12  - Stable  - Pain Control  - DVT ppx: scd  - Post op abx: vanc/zosyn  - f/u OR cx   - f/u ID consult   - WBS: nwb rue   - OT eval     Ortho Pager [phone]

## 2024-02-12 NOTE — ED ADULT NURSE NOTE - OBJECTIVE STATEMENT
64 y/o male with pmhx BPH, HLD, hip replacement, back surgery, and knee surgery. Presents to the ED c/o "fungal infection" to R hand which started approx 1mo ago. Pt was swabbed and tested positive for fungal infection outpatient. Denies pain/discomfort at this time. Denies CP, SOB, headache, vision changes, F/C, N/V/D. On exam, A&Ox4, NAD, RA, ambulatory, VSS. PIV placed, labs sent. 62 y/o male with pmhx BPH, HLD, hip replacement, back surgery, and knee surgery. Presents to the ED c/o "fungal infection" to R hand which started approx 1mo ago. Pt was swabbed and tested positive for fungal infection outpatient then sent here. Patient reports purulent drainage and swelling to R hand x4 weeks, "tender to touch". Denies pain/discomfort at this time. Denies CP, SOB, headache, vision changes, F/C, N/V/D. On exam, A&Ox4, NAD, RA, ambulatory, VSS. Pt reports having R eye surgery and that "the pupil does not change in response to light". Upon assessment, pupils are different sizes, R pupil is fixed, L pupil responsive to light. PIV placed, labs sent.

## 2024-02-12 NOTE — H&P ADULT - PROBLEM SELECTOR PLAN 1
VS stable  Labs pending, CBC, BMP, COAGS, ESR, CRP  pain management as needed  DVT ppx: scds  DIET: NPO  DISPO: OR today for right thumb irrigation and debridement

## 2024-02-12 NOTE — BRIEF OPERATIVE NOTE - NSICDXBRIEFPROCEDURE_GEN_ALL_CORE_FT
PROCEDURES:  Incision and drainage, hand, with hematoma evacuation, irrigation, debridement, and wound closure 12-Feb-2024 22:07:13  Robert Guerrier

## 2024-02-12 NOTE — ED ADULT NURSE NOTE - CHIEF COMPLAINT QUOTE
Pt presents to ED C/O R hand infection x 1 month. Sent by MD Curry for possible IV antibiotics or admit as per pt. Pt states, " They did a swab weeks ago and found a fungal infection". Denies fevers.

## 2024-02-12 NOTE — ED ADULT NURSE NOTE - NSFALLUNIVINTERV_ED_ALL_ED
Bed/Stretcher in lowest position, wheels locked, appropriate side rails in place/Call bell, personal items and telephone in reach/Instruct patient to call for assistance before getting out of bed/chair/stretcher/Non-slip footwear applied when patient is off stretcher/[place] to call system/Physically safe environment - no spills, clutter or unnecessary equipment/Purposeful proactive rounding/Room/bathroom lighting operational, light cord in reach

## 2024-02-12 NOTE — ED PROVIDER NOTE - OBJECTIVE STATEMENT
63 M co thumb pain- R thumb pain- s/p trigger finger release - with infection- took several rounds of abx- swab was + for fungal infection- took an antifungal  here to get opened and IV abx or antifungal  mod severity

## 2024-02-13 ENCOUNTER — TRANSCRIPTION ENCOUNTER (OUTPATIENT)
Age: 64
End: 2024-02-13

## 2024-02-13 LAB
ANION GAP SERPL CALC-SCNC: 10 MMOL/L — SIGNIFICANT CHANGE UP (ref 5–17)
BUN SERPL-MCNC: 20 MG/DL — SIGNIFICANT CHANGE UP (ref 7–23)
CALCIUM SERPL-MCNC: 8.5 MG/DL — SIGNIFICANT CHANGE UP (ref 8.4–10.5)
CHLORIDE SERPL-SCNC: 102 MMOL/L — SIGNIFICANT CHANGE UP (ref 96–108)
CO2 SERPL-SCNC: 24 MMOL/L — SIGNIFICANT CHANGE UP (ref 22–31)
CREAT SERPL-MCNC: 1.04 MG/DL — SIGNIFICANT CHANGE UP (ref 0.5–1.3)
EGFR: 82 ML/MIN/1.73M2 — SIGNIFICANT CHANGE UP
GLUCOSE SERPL-MCNC: 136 MG/DL — HIGH (ref 70–99)
GRAM STN FLD: ABNORMAL
GRAM STN FLD: SIGNIFICANT CHANGE UP
HCT VFR BLD CALC: 39.2 % — SIGNIFICANT CHANGE UP (ref 39–50)
HGB BLD-MCNC: 12.8 G/DL — LOW (ref 13–17)
MCHC RBC-ENTMCNC: 32.1 PG — SIGNIFICANT CHANGE UP (ref 27–34)
MCHC RBC-ENTMCNC: 32.7 GM/DL — SIGNIFICANT CHANGE UP (ref 32–36)
MCV RBC AUTO: 98.2 FL — SIGNIFICANT CHANGE UP (ref 80–100)
NIGHT BLUE STAIN TISS: SIGNIFICANT CHANGE UP
NRBC # BLD: 0 /100 WBCS — SIGNIFICANT CHANGE UP (ref 0–0)
PLATELET # BLD AUTO: 304 K/UL — SIGNIFICANT CHANGE UP (ref 150–400)
POTASSIUM SERPL-MCNC: 4.1 MMOL/L — SIGNIFICANT CHANGE UP (ref 3.5–5.3)
POTASSIUM SERPL-SCNC: 4.1 MMOL/L — SIGNIFICANT CHANGE UP (ref 3.5–5.3)
RBC # BLD: 3.99 M/UL — LOW (ref 4.2–5.8)
RBC # FLD: 13.1 % — SIGNIFICANT CHANGE UP (ref 10.3–14.5)
SODIUM SERPL-SCNC: 136 MMOL/L — SIGNIFICANT CHANGE UP (ref 135–145)
SPECIMEN SOURCE: SIGNIFICANT CHANGE UP
WBC # BLD: 9.56 K/UL — SIGNIFICANT CHANGE UP (ref 3.8–10.5)
WBC # FLD AUTO: 9.56 K/UL — SIGNIFICANT CHANGE UP (ref 3.8–10.5)

## 2024-02-13 PROCEDURE — 99222 1ST HOSP IP/OBS MODERATE 55: CPT

## 2024-02-13 RX ORDER — PIPERACILLIN AND TAZOBACTAM 4; .5 G/20ML; G/20ML
3.38 INJECTION, POWDER, LYOPHILIZED, FOR SOLUTION INTRAVENOUS EVERY 8 HOURS
Refills: 0 | Status: DISCONTINUED | OUTPATIENT
Start: 2024-02-13 | End: 2024-02-14

## 2024-02-13 RX ORDER — PRIMIDONE 250 MG/1
150 TABLET ORAL AT BEDTIME
Refills: 0 | Status: DISCONTINUED | OUTPATIENT
Start: 2024-02-13 | End: 2024-02-17

## 2024-02-13 RX ADMIN — OXYCODONE HYDROCHLORIDE 5 MILLIGRAM(S): 5 TABLET ORAL at 22:10

## 2024-02-13 RX ADMIN — Medication 15 MILLIGRAM(S): at 12:44

## 2024-02-13 RX ADMIN — OXYCODONE HYDROCHLORIDE 5 MILLIGRAM(S): 5 TABLET ORAL at 02:54

## 2024-02-13 RX ADMIN — PIPERACILLIN AND TAZOBACTAM 25 GRAM(S): 4; .5 INJECTION, POWDER, LYOPHILIZED, FOR SOLUTION INTRAVENOUS at 17:13

## 2024-02-13 RX ADMIN — Medication 15 MILLIGRAM(S): at 06:12

## 2024-02-13 RX ADMIN — POLYETHYLENE GLYCOL 3350 17 GRAM(S): 17 POWDER, FOR SOLUTION ORAL at 22:10

## 2024-02-13 RX ADMIN — Medication 166.67 MILLIGRAM(S): at 06:13

## 2024-02-13 RX ADMIN — PANTOPRAZOLE SODIUM 40 MILLIGRAM(S): 20 TABLET, DELAYED RELEASE ORAL at 06:12

## 2024-02-13 RX ADMIN — Medication 15 MILLIGRAM(S): at 17:13

## 2024-02-13 RX ADMIN — ESCITALOPRAM OXALATE 20 MILLIGRAM(S): 10 TABLET, FILM COATED ORAL at 22:10

## 2024-02-13 RX ADMIN — OXYCODONE HYDROCHLORIDE 5 MILLIGRAM(S): 5 TABLET ORAL at 09:13

## 2024-02-13 RX ADMIN — Medication 166.67 MILLIGRAM(S): at 17:13

## 2024-02-13 RX ADMIN — PIPERACILLIN AND TAZOBACTAM 25 GRAM(S): 4; .5 INJECTION, POWDER, LYOPHILIZED, FOR SOLUTION INTRAVENOUS at 09:13

## 2024-02-13 RX ADMIN — OXYCODONE HYDROCHLORIDE 5 MILLIGRAM(S): 5 TABLET ORAL at 23:10

## 2024-02-13 RX ADMIN — PIPERACILLIN AND TAZOBACTAM 25 GRAM(S): 4; .5 INJECTION, POWDER, LYOPHILIZED, FOR SOLUTION INTRAVENOUS at 01:00

## 2024-02-13 RX ADMIN — PRIMIDONE 50 MILLIGRAM(S): 250 TABLET ORAL at 10:51

## 2024-02-13 RX ADMIN — SENNA PLUS 2 TABLET(S): 8.6 TABLET ORAL at 22:10

## 2024-02-13 RX ADMIN — PRIMIDONE 150 MILLIGRAM(S): 250 TABLET ORAL at 22:10

## 2024-02-13 RX ADMIN — OXYCODONE HYDROCHLORIDE 5 MILLIGRAM(S): 5 TABLET ORAL at 03:54

## 2024-02-13 RX ADMIN — OXYCODONE HYDROCHLORIDE 5 MILLIGRAM(S): 5 TABLET ORAL at 10:13

## 2024-02-13 NOTE — DISCHARGE NOTE PROVIDER - CARE PROVIDER_API CALL
Chris Russo.  Orthopaedic Surgery  [address] [place] [address], [address]  [place] [address]  Phone: [phone]  Fax: [phone]  Follow Up Time: 2 weeks   Chris Russo  Orthopaedic Surgery  [address] [place] [address], [address]  [place] [address]  Phone: [phone]  Fax: [phone]  Follow Up Time: 2 weeks    Bobby Toro  Infectious Disease  [address], [address]  [place] [address]  Phone: [phone]  Fax: [phone]  Follow Up Time: 2 weeks

## 2024-02-13 NOTE — DISCHARGE NOTE PROVIDER - DISCHARGE DATE
Small sips of water taken, unable to eat crackers       Carla Jose, SHAYY  02/06/23 [id number] 16-Feb-2024 17-Feb-2024

## 2024-02-13 NOTE — DISCHARGE NOTE PROVIDER - HOSPITAL COURSE
Admitted 2/12/24 via ED to [facility]  Surgery 2/12/24 Right thumb irrigation and debridement  Sharla-op Antibiotics  Pain control  DVT prophylaxis  OOB/Physical Therapy  Consultants: Medicine, Infectious Disease  Inpatient Events:   2/12: Admitted via ED. OR for right thumb I&D, cultures sent. Gram stain showing gram positive cocci in pairs. Started empirically on Vancomycin and Zosyn.  2/13: Infectious disease consulted.    Admitted 2/12/24 via ED to [facility]  Surgery 2/12/24 Right thumb irrigation and debridement  Sharla-op Antibiotics  Pain control  DVT prophylaxis  OOB/Physical Therapy  Consultants: Medicine, Infectious Disease  Inpatient Events:   2/12: Admitted via ED. OR for right thumb I&D, cultures sent. Gram stain showing gram positive cocci in pairs. Started empirically on Vancomycin and Zosyn.  2/13: Infectious disease consulted.   2/14: blood cultures drawn, started on cefepime and fluconazole following results of outside wound cultures obtained at [facility] on 1/28 growing staph epi/Enterobacter/staph epi  2/15: continue antibiotics, blood cultures no growth   2/16: midline placement for home antibiotics     Admitted 2/12/24 via ED to [facility]  Surgery 2/12/24 Right thumb irrigation and debridement  Sharla-op Antibiotics  Pain control  DVT prophylaxis  OOB/Physical Therapy  Consultants: Medicine, Infectious Disease  Inpatient Events:   2/12: Admitted via ED. OR for right thumb I&D, cultures sent. Gram stain showing gram positive cocci in pairs. Started empirically on Vancomycin and Zosyn.  2/13: Infectious disease consulted.   2/14: blood cultures drawn, started on cefepime and fluconazole following results of outside wound cultures obtained at [facility] on 1/28 growing staph epi/Enterobacter/staph epi  2/15: continue antibiotics, blood cultures no growth   2/16: midline placement for home antibiotics, antibiotics switched to ertapenem and daptomycin IV  2/17: stable for d/c home, home infusion services set up for Sunday 2/18     Final Infectious Disease Recommendations:  - start Daptomycin 600mg IV daily  - start ertapenem 1g IV daily   - Continue fluconazole 400mg PO daily  - Duration of antibiotics is 2 weeks from I&D ( 2/12 - 2/25 )  - Weekly labs faxed to ID office at [phone]  - Patient to follow up with Dr. Toro in 2 weeks ([address] [place] [address], [phone]), ID office will call patient to schedule

## 2024-02-13 NOTE — DISCHARGE NOTE PROVIDER - NSDCCPCAREPLAN_GEN_ALL_CORE_FT
PRINCIPAL DISCHARGE DIAGNOSIS  Diagnosis: Surgical site infection  Assessment and Plan of Treatment:

## 2024-02-13 NOTE — CONSULT NOTE ADULT - SUBJECTIVE AND OBJECTIVE BOX
HPI:  62 yo M with HLD, essential tremors, s/p PSF L5-S1, s/p L EDUAR (12/21/2023), s/p R EDUAR and anxiety s/p R trigger thumb release on 1/2/24 c/b SSI.  ID consult for assistance with antibiotics.  He was seen on 1/12 for first post-op visit, at which time he was noted to have removed his postop bandage against medical advice and keeping thumb uncovered.  Nylon sutures were removed and replaced with Steri-strips.  The R thumb was noted to be macerated but without drainage or intense redness.  He was started on cephalexin for infection prophylaxis.  Against direction, he continued to get hand wet and use it without restriction.  He went to [facility] where cultures were done and he was started on dox/augmentin and an oral antifungal.  He went to [facility] ED on 2/9, where his incision was irrigated and then redressed.  When seen on 2/9, he was found to have maceration of the R thumb palmar incision with mild erythema and swelling with a small amt of clear drainage, as well as mild tenderness along the FPL tendon and thenar eminence and the volar aspect of the R wrist.  He was felt to have infection spreading into Parona space.  Surgery was planned for 2/12, at which time he underwent I&D with hematoma evacuation and wound closure.  Pus was present in Parona;s space, carpal tunnel and flexor sheath.  He was started on vanc and pip-tazo.  Subcutaneous tissue R thumb OR specimen is growing Staph epi with rare GPC in prs on gram stain.  Per Mr. Amaya, he had no improvement with any of the antibiotics, no fever, chills.      PAST MEDICAL & SURGICAL HISTORY:  Essential tremor  arms      Osteonecrosis  right hip      Low back pain      HLD (hyperlipidemia)      Anxiety      BPH (benign prostatic hyperplasia)      S/P knee surgery  b/l knee ACL repair      S/P biopsy  back, benign cyst      History of hip replacement, total, right  2020      S/P carpal tunnel release  b/l      Previous back surgery  LOWER BACK FUSION              MEDICATIONS  (STANDING):  chlorhexidine 2% Cloths 1 Application(s) Topical once  escitalopram 20 milliGRAM(s) Oral at bedtime  ketorolac   Injectable 15 milliGRAM(s) IV Push every 6 hours  pantoprazole    Tablet 40 milliGRAM(s) Oral before breakfast  piperacillin/tazobactam IVPB.. 3.375 Gram(s) IV Intermittent every 8 hours  polyethylene glycol 3350 17 Gram(s) Oral at bedtime  povidone iodine 5% Nasal Swab 1 Application(s) Both Nostrils once  primidone 150 milliGRAM(s) Oral at bedtime  senna 2 Tablet(s) Oral at bedtime  vancomycin  IVPB 1250 milliGRAM(s) IV Intermittent every 12 hours    MEDICATIONS  (PRN):  acetaminophen     Tablet .. 1000 milliGRAM(s) Oral every 8 hours PRN Mild Pain (1 - 3)  HYDROmorphone  Injectable 0.5 milliGRAM(s) IV Push every 3 hours PRN breakthrough pain on the floor  oxyCODONE    IR 5 milliGRAM(s) Oral every 4 hours PRN Severe Pain (7 - 10)  prochlorperazine   Injectable 5 milliGRAM(s) IV Push once PRN Nausea and/or Vomiting      Allergies    No Known Allergies    Intolerances        SOCIAL HISTORY:  Born in [place], [place].  Single, shares a house with a woman.  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  No tobacco or recreational drugs.  No pets/animal contact.    FAMILY HISTORY:  No pertinent family history in first degree relatives.     ROS:  No HA, photophobia, neck stiffness, rhinorrhea, sore throat, cough, CP, SOB, N, V, diarrhea, abd pain, dysuria, hematuria, frequency, muscle/joint symptoms, bruising/bleeding       Vital Signs Last 24 Hrs  T(C): 36.8 (13 Feb 2024 20:40), Max: 36.8 (13 Feb 2024 05:30)  T(F): 98.2 (13 Feb 2024 20:40), Max: 98.3 (13 Feb 2024 05:30)  HR: 60 (13 Feb 2024 20:40) (60 - 80)  BP: 135/72 (13 Feb 2024 20:40) (122/70 - 210/96)  BP(mean): 92 (13 Feb 2024 09:28) (88 - 138)  RR: 17 (13 Feb 2024 20:40) (11 - 19)  SpO2: 96% (13 Feb 2024 20:40) (94% - 99%)    Parameters below as of 13 Feb 2024 20:40  Patient On (Oxygen Delivery Method): room air        PE:  Alert, in no distress  HEENT:  NC, PERRL, sclerae anicteric, conjunctivae clear.  Sinuses nontender, no nasal exudate.  No buccal or pharyngeal lesions, erythema or exudate  Neck:  Supple, no adenopathy  Lungs:  Clear to auscultation  Cor:  RRR, S1, S2, no murmur appreciated  Abd:  Symmetric, normoactive BS.  Soft, nontender, no masses, guarding or rebound.  Liver and spleen not enlarged  Extrem:  No cyanosis or edema.  RUE in surgical splint, no warmth proximally  Skin:  No rashes.    LABS:                        12.8   9.56  )-----------( 304      ( 13 Feb 2024 05:30 )             39.2     02-13    136  |  102  |  20  ----------------------------<  136<H>  4.1   |  24  |  1.04    Ca    8.5      13 Feb 2024 05:30      Urinalysis Basic - ( 13 Feb 2024 05:30 )    Color: x / Appearance: x / SG: x / pH: x  Gluc: 136 mg/dL / Ketone: x  / Bili: x / Urobili: x   Blood: x / Protein: x / Nitrite: x   Leuk Esterase: x / RBC: x / WBC x   Sq Epi: x / Non Sq Epi: x / Bacteria: x        MICROBIOLOGY:    OR cultures X 4:  1/4 with Staph epi  AFB and fungal cultures    RADIOLOGY & ADDITIONAL STUDIES:    < from: Xray Hand 3 Views, Right (02.12.24 @ 14:45) >  FINDINGS:    No acute fracture.  No dislocation. Mild cartilage space narrowing and   marginal osteophyte formation in the DIP joints of the index and small   fingers as well as the IP joint of the thumb. Cartilage spaces are   otherwise maintained. No abnormal soft tissue swelling or mineralization.   No radiopaque foreign body.        IMPRESSION:  1.  No acute fracture or dislocation.  2.  Mild osteoarthritic changes of the hand.      < end of copied text >  
HPI as per ortho H&P: 63M with PMHx essential tremors, anxiety and HLD s/p right trigger thumb release on 1/2/24 c/b surgical site infection sent in by Dr. Russo for I&D and IV antibiotics today. He said that the first week after surgery he started to have some tenderness and redness of his surgical incision. He says that he went back to work as a  1 week after surgery where he was using his hands a lot and endorse the site getting wet/dirty even with the gloves he wore. He denies any numbness, tingling or weakness of the right upper extremity. He has been taking oral antibiotics but is unable to identify which specifically. He said he saw Dr. Russo on Friday who told him he would need surgery to wash out the infection and be admitted to the hospital for IV antibiotics. He denies any fevers, chills, shortness of breath, chest pain, nausea, vomiting, recent travel, recent illness.     Today, patient is doing well without acute complains. Pain well controlled with oxycodone.  Denies HA, CP, SOB, abdominal pain, nausea, vomiting, fever, chills or diarrhea.     PMHx: essential tremors, anxiety, HLD    PSHx: right EDUAR, lumbar fusion, carpal tunnel release, ACL repair     FHx: non-contributory     SHx: denies alcohol, tobacco or illicit drug use    Allergies: NKDA    Home Medications:   · primidone: 150 milligram(s) orally qhs   · tamsulosin 0.4 mg oral capsule: 1 cap(s) orally once a day   · Lexapro 20 mg oral tablet: 1 tab(s) orally once a day     Objective:   Vital Signs Last 24 Hrs  T(C): 36.4 (13 Feb 2024 09:28), Max: 37 (12 Feb 2024 17:26)  T(F): 97.6 (13 Feb 2024 09:28), Max: 98.6 (12 Feb 2024 17:26)  HR: 67 (13 Feb 2024 09:28) (56 - 80)  BP: 132/72 (13 Feb 2024 09:28) (122/70 - 210/96)  BP(mean): 92 (13 Feb 2024 09:28) (88 - 138)  RR: 17 (13 Feb 2024 09:28) (11 - 19)  SpO2: 99% (13 Feb 2024 09:28) (94% - 99%)    Parameters below as of 13 Feb 2024 09:28  Patient On (Oxygen Delivery Method): room air        Physical Exam:   -Gen: NAD, resting in bed  -HEENT: EOMI, PERRL, no scleral icterus  -CV: normal S1 and S2  -Lungs: CTABL, normal respiratory effort on RA  -Ab: soft, NT, ND, normal BS  -Ext: no LE edema, right hand wrapped and elevated  -Neuro: A&O x 3, no focal deficits     Labs:                        12.8   9.56  )-----------( 304      ( 13 Feb 2024 05:30 )             39.2       02-13    136  |  102  |  20  ----------------------------<  136<H>  4.1   |  24  |  1.04    Ca    8.5      13 Feb 2024 05:30        Microbiology:        Medications:  MEDICATIONS  (STANDING):  chlorhexidine 2% Cloths 1 Application(s) Topical once  escitalopram 20 milliGRAM(s) Oral at bedtime  ketorolac   Injectable 15 milliGRAM(s) IV Push every 6 hours  pantoprazole    Tablet 40 milliGRAM(s) Oral before breakfast  piperacillin/tazobactam IVPB.. 3.375 Gram(s) IV Intermittent every 8 hours  polyethylene glycol 3350 17 Gram(s) Oral at bedtime  povidone iodine 5% Nasal Swab 1 Application(s) Both Nostrils once  primidone 150 milliGRAM(s) Oral at bedtime  primidone 50 milliGRAM(s) Oral every 12 hours  senna 2 Tablet(s) Oral at bedtime  vancomycin  IVPB 1250 milliGRAM(s) IV Intermittent every 12 hours    MEDICATIONS  (PRN):  acetaminophen     Tablet .. 1000 milliGRAM(s) Oral every 8 hours PRN Mild Pain (1 - 3)  HYDROmorphone  Injectable 0.5 milliGRAM(s) IV Push every 3 hours PRN breakthrough pain on the floor  oxyCODONE    IR 5 milliGRAM(s) Oral every 4 hours PRN Severe Pain (7 - 10)  prochlorperazine   Injectable 5 milliGRAM(s) IV Push once PRN Nausea and/or Vomiting

## 2024-02-13 NOTE — CONSULT NOTE ADULT - ASSESSMENT
64 yo M with HLD, essential tremors, s/p L EDUAR and anxiety s/p R trigger thumb release on 1/2/24 c/b SSI.  Afebrile, WBC 9.6.  He is on vanc and pip-tazo.  Significance of Staph epi in 1/4 cultures is not clear and that culture may have a second organism.  He has received several courses of po antibiotics.  Would continue broad spectrum coverage.  Suggest:  - Please send blood cultures X 2 sets  - F/U OR cultures from 2/12  - Continue vancomycin 1250 mg IV q12h.  Trough prior to 4th dose (prior to 6 pm dose on 2/14)  - Continue pip-tazo 3.375 g IV q8h via EI  Will follow with you, team 2. 
63-year-old male with a PMHx of essential tremors, anxiety, HLD and recent trigger finger release (c/b surgical site infection, s/p I&D and IV Abx) who presented for washout.     #Surgical Site Infection   -further management as per ortho, s/p I&D with hematoma evacuation on 2/12   -currently on vancomycin and zosyn    -ID consulted, follow up recommendations    -OR Cx (2/12): GPC, follow up speciation and sensitivities    -OT consulted     #Essential Tremor   -continue with Primidone 150mg qhs     #Anxiety    -continue with Lexapro 20mg daily      #BPH   -continue with tamsulosin 0.4mg qhs      DVT PPx: SCDs    Dispo: home

## 2024-02-13 NOTE — DISCHARGE NOTE PROVIDER - CARE PROVIDERS DIRECT ADDRESSES
[email or web address]

## 2024-02-13 NOTE — DISCHARGE NOTE PROVIDER - NSDCFUADDINST_GEN_ALL_CORE_FT
ACTIVITY:   - Do not bear weight to your right upper extremity. No strenuous activity, heavy lifting, driving or returning to work until cleared by MD.   - Keep arm elevated in Semaj block whenever possible to help reduce post-operative swelling.     DRESSING/SHOWERING:   (Gauze and Kerlix)   - Keep dressing clean and dry, it will be addressed at your post-operative visit. Once dressing removed keep incision clean and dry. Do not pick at your incision. Do not apply creams, ointments or oils to your incision until cleared by your surgeon. Do not soak your incision in sitting water (ie tubs, pools, lakes, etc.) until cleared by your surgeon.      MEDICATION/ANTICOAGULATION:   - You have been prescribed medications for pain:     - Tylenol for mild to moderate pain. Do not exceed 3,000mg daily.     - For more severe pain, take Tylenol with the addition of narcotic pain medication. Take this medication as prescribed. This medication may cause drowsiness or dizziness. Do not operate machinery. This medication may cause constipation.   - For any additional medications, follow instructions on the bottle.    -Try to have regular bowel movements. Take stool softener or laxative if necessary. You may wish to take Miralax daily until you have regular bowel movements.    - If you have been prescribed Aspirin or an anti-inflammatory, please take prilosec (omeprazole) once a day, before breakfast, until no longer taking Aspirin or anti-inflammatory. This will help protect your stomach.   - If you have a pain management physician, please follow-up with them postoperatively.    - If you experience any negative side effects of your medications, please call your surgeon's office to discuss.      FOLLOW-UP:   - Call to schedule an appt with Dr. Russo for follow up.   - Please follow-up with your primary care physician or any other specialist you see postoperatively, if needed.    - Contact your doctor or go to the emergency room if you experience: fever greater than 101.5, chills, chest pain, difficulty breathing, redness or excessive drainage around the incision, other concerns.   ACTIVITY:   - Do not bear weight to your right upper extremity. No strenuous activity, heavy lifting, driving or returning to work until cleared by MD.   - Keep arm elevated in Semaj block whenever possible to help reduce post-operative swelling.     DRESSING/SHOWERING:   (Gauze and Kerlix)   - Keep dressing clean and dry, it will be addressed at your post-operative visit. Once dressing removed keep incision clean and dry. Do not pick at your incision. Do not apply creams, ointments or oils to your incision until cleared by your surgeon. Do not soak your incision in sitting water (ie tubs, pools, lakes, etc.) until cleared by your surgeon.      MEDICATION/ANTICOAGULATION:   - Follow home infusion instructions regarding IV antibiotics. Take fluconazole daily as prescribed. For questions regarding antibiotics/antifungals, please call Infectious Disease Dr. Toro's Office.   - You have been prescribed medications for pain:     - Tylenol for mild to moderate pain. Do not exceed 3,000mg daily.     - For more severe pain, take Tylenol with the addition of narcotic pain medication. Take this medication as prescribed. This medication may cause drowsiness or dizziness. Do not operate machinery. This medication may cause constipation.   - For any additional medications, follow instructions on the bottle.    -Try to have regular bowel movements. Take stool softener or laxative if necessary. You may wish to take Miralax daily until you have regular bowel movements.    - If you have a pain management physician, please follow-up with them postoperatively.    - If you experience any negative side effects of your medications, please call your surgeon's office to discuss.      FOLLOW-UP:   - Call to schedule an appt with Dr. Russo for follow up.   - Please follow-up with your primary care physician or any other specialist you see postoperatively, if needed.    - Contact your doctor or go to the emergency room if you experience: fever greater than 101.5, chills, chest pain, difficulty breathing, redness or excessive drainage around the incision, other concerns.

## 2024-02-13 NOTE — DISCHARGE NOTE PROVIDER - NSDCCPTREATMENT_GEN_ALL_CORE_FT
PRINCIPAL PROCEDURE  Procedure: Incision and drainage, hand, with hematoma evacuation, irrigation, debridement, and wound closure  Findings and Treatment: right thumb

## 2024-02-13 NOTE — CONSULT NOTE ADULT - REASON FOR ADMISSION
s/p right trigger thumb release c/b surgical site infection
s/p right trigger thumb release c/b surgical site infection

## 2024-02-13 NOTE — DISCHARGE NOTE PROVIDER - NSDCFUSCHEDAPPT_GEN_ALL_CORE_FT
Kyrie Davis  [facility]  ORTHOSURG [address]  Scheduled Appointment: 03/05/2024    Irene Son  [facility]  DERM [address]  Scheduled Appointment: 03/26/2024    Diallo Hwang  [facility]  ORTHOSURG [address]  Scheduled Appointment: 04/03/2024

## 2024-02-13 NOTE — DISCHARGE NOTE PROVIDER - NSDCMRMEDTOKEN_GEN_ALL_CORE_FT
acetaminophen 500 mg oral tablet: 2 tab(s) orally every 8 hours  aspirin 81 mg oral delayed release tablet: 1 tab(s) orally 2 times a day for 4 weeks after sugery  celecoxib 100 mg oral capsule: 1 cap(s) orally 2 times a day  Lexapro 20 mg oral tablet: 1 tab(s) orally once a day (at bedtime)  oxyCODONE 5 mg oral tablet: 1 tab(s) orally every 6 hours as needed for  severe pain  pantoprazole 40 mg oral delayed release tablet: 1 tab(s) orally once a day (before a meal)  primidone: 50 milligram(s) orally 2 times a day  1 tab in am  2 tabs in PM  tamsulosin 0.4 mg oral capsule: 1 cap(s) orally once a day   acetaminophen 500 mg oral tablet: 2 tab(s) orally every 8 hours  Daptomycin 600mg IV every 24 hours till 2/26/24: Please take 600mg IV every 24h  ertapenem 1 g injection: 1 gram(s) intravenously every 24 hours Please take 1g every 24 hours till 2/26/24  fluconazole 200 mg oral tablet: 2 tab(s) orally once a day MDD: 2  Heparin 3ml from 5ml syringe: Administer after each infusion  Lexapro 20 mg oral tablet: 1 tab(s) orally once a day (at bedtime)  NS flush 10ml: Administer after each infusion  oxyCODONE 5 mg oral tablet: 1 tab(s) orally every 6 hours as needed for Severe Pain (7 - 10) MDD: 4  pantoprazole 40 mg oral delayed release tablet: 1 tab(s) orally once a day (before a meal)  polyethylene glycol 3350 oral powder for reconstitution: 17 gram(s) orally once a day (at bedtime)  primidone: 50 milligram(s) orally 2 times a day  1 tab in am  2 tabs in PM  tamsulosin 0.4 mg oral capsule: 1 cap(s) orally once a day  vancomycin 1.25 g intravenous injection: 1.25 gram(s) intravenous every 12 hours  Weekly labs CBC/CMP/ESR/CRP/CK: 1x/week x 2 weeks till 2/26/24. Send results to ID Dr. Toro at [phone]   acetaminophen 500 mg oral tablet: 2 tab(s) orally every 8 hours  Daptomycin 600mg IV every 24 hours till 2/26/24: Please take 600mg IV every 24h  ertapenem 1 g injection: 1 gram(s) intravenously every 24 hours Please take 1g every 24 hours till 2/26/24  fluconazole 200 mg oral tablet: 2 tab(s) orally once a day MDD: 2  Heparin 3ml from 5ml syringe: Administer after each infusion  Lexapro 20 mg oral tablet: 1 tab(s) orally once a day (at bedtime)  NS flush 10ml: Administer after each infusion  oxyCODONE 5 mg oral tablet: 1 tab(s) orally every 6 hours as needed for Severe Pain (7 - 10) MDD: 4  pantoprazole 40 mg oral delayed release tablet: 1 tab(s) orally once a day (before a meal)  polyethylene glycol 3350 oral powder for reconstitution: 17 gram(s) orally once a day (at bedtime)  primidone: 50 milligram(s) orally 2 times a day  1 tab in am  2 tabs in PM  tamsulosin 0.4 mg oral capsule: 1 cap(s) orally once a day  Weekly labs CBC/CMP/ESR/CRP/CK: 1x/week x 2 weeks till 2/26/24. Send results to ID Dr. Toro at [phone]

## 2024-02-13 NOTE — DISCHARGE NOTE PROVIDER - PROVIDER TOKENS
PROVIDER:[TOKEN:[[id number]],FOLLOWUP:[2 weeks]] PROVIDER:[TOKEN:[[id number]],FOLLOWUP:[2 weeks]],PROVIDER:[TOKEN:[[id number]],FOLLOWUP:[2 weeks]]

## 2024-02-13 NOTE — PROGRESS NOTE ADULT - SUBJECTIVE AND OBJECTIVE BOX
Ortho Note    Patient seen and examined at bedside. Pt comfortable without complaints, pain controlled. States he didn't sleep much overnight. Otherwise voiding freely, passing flatus.   Denies CP, SOB, N/V, new numbness/tingling     Vital Signs Last 24 Hrs  T(C): 36.4 (02-13-24 @ 09:28), Max: 36.8 (02-13-24 @ 05:30)  T(F): 97.6 (02-13-24 @ 09:28), Max: 98.3 (02-13-24 @ 05:30)  HR: 67 (02-13-24 @ 09:28) (67 - 78)  BP: 132/72 (02-13-24 @ 09:28) (122/70 - 132/72)  BP(mean): 92 (02-13-24 @ 09:28) (88 - 92)  RR: 17 (02-13-24 @ 09:28) (17 - 18)  SpO2: 99% (02-13-24 @ 09:28) (97% - 99%)  I&O's Summary    12 Feb 2024 07:01  -  13 Feb 2024 07:00  --------------------------------------------------------  IN: 500 mL / OUT: 500 mL / NET: 0 mL    13 Feb 2024 07:01  -  13 Feb 2024 11:11  --------------------------------------------------------  IN: 300 mL / OUT: 0 mL / NET: 300 mL        General: Pt Alert and oriented, NAD  DSG C/D/I- Gauze/Semaj fuller block in place  Pulses: Unable to assess radial pulse on operative arm due to dressing, skin wwp, cap refill brisk  Sensation: SILT to bilateral distal upper extremities  Motor: Wiggling all fingers bilaterally                            12.8   9.56  )-----------( 304      ( 13 Feb 2024 05:30 )             39.2     02-13    136  |  102  |  20  ----------------------------<  136<H>  4.1   |  24  |  1.04    Ca    8.5      13 Feb 2024 05:30        A/P: 64yo Male s/p right trigger thumb release on 1/2/24, c/b post op SSI, now POD#1 s/p Right thumb I&D  - Stable, appreciate medical comanagement  - Abx: empiric Vanc + Zosyn. ID consulted, f/u recs  - OR gram stain growing gram positive cocci in pairs, f/u OR cultures  - Pain Control  - OOB/IS  - Bowel Regimen  - DVT ppx: SCDs  - PT, WBS: NWB to RUE  - Dispo: Pending final ID [email or web address]     Ortho Pager [phone]

## 2024-02-13 NOTE — PROGRESS NOTE ADULT - SUBJECTIVE AND OBJECTIVE BOX
Procedure: R thumb i/d   Surgeon: ronda     Pt comfortable without complaints, pain controlled  Denies CP, SOB, N/V, numbness/tingling     Vital Signs Last 24 Hrs  T(C): 36.4 (02-12-24 @ 23:25), Max: 36.5 (02-12-24 @ 17:45)  T(F): 97.6 (02-12-24 @ 23:25), Max: 97.7 (02-12-24 @ 17:45)  HR: 80 (02-12-24 @ 23:25) (56 - 80)  BP: 151/74 (02-12-24 @ 23:25) (149/78 - 210/96)  BP(mean): 113 (02-12-24 @ 22:51) (113 - 138)  RR: 18 (02-12-24 @ 23:25) (11 - 19)  SpO2: 96% (02-12-24 @ 23:25) (94% - 99%)    General: Pt Alert and oriented, NAD  R hand elev in rodriguez block   Pulses: unable to assess, thumb and fingers wwp, <2 sec cap refill   Sensation: silt r/u distribution of R thumb and symmetric to L thumb, silt m/u/r distribution   Motor: he is able to wiggle his R thumb volar/dorsal but limited 2/2 pain, FDP/extensor mechanism for digits 2-5 intact         A/P: 63yMale s/p R thumb/carpal tunnel/parona space i/d  by Dr. ELIZABET Russo on 02-12  - Stable  - Pain Control  - DVT ppx: scd  - Post op abx: vanc/zosyn  - f/u OR cx - GPC in pairs   - f/u ID consult   - WBS: nwb rue   - OT eval     Ortho Pager [phone]

## 2024-02-14 LAB
-  CLINDAMYCIN: SIGNIFICANT CHANGE UP
-  ERYTHROMYCIN: SIGNIFICANT CHANGE UP
-  LINEZOLID: SIGNIFICANT CHANGE UP
-  OXACILLIN: SIGNIFICANT CHANGE UP
-  RIFAMPIN: SIGNIFICANT CHANGE UP
-  TRIMETHOPRIM/SULFAMETHOXAZOLE: SIGNIFICANT CHANGE UP
-  VANCOMYCIN: SIGNIFICANT CHANGE UP
ANION GAP SERPL CALC-SCNC: 5 MMOL/L — SIGNIFICANT CHANGE UP (ref 5–17)
BUN SERPL-MCNC: 18 MG/DL — SIGNIFICANT CHANGE UP (ref 7–23)
CALCIUM SERPL-MCNC: 8.7 MG/DL — SIGNIFICANT CHANGE UP (ref 8.4–10.5)
CHLORIDE SERPL-SCNC: 106 MMOL/L — SIGNIFICANT CHANGE UP (ref 96–108)
CO2 SERPL-SCNC: 24 MMOL/L — SIGNIFICANT CHANGE UP (ref 22–31)
CREAT SERPL-MCNC: 0.94 MG/DL — SIGNIFICANT CHANGE UP (ref 0.5–1.3)
CULTURE RESULTS: ABNORMAL
EGFR: 92 ML/MIN/1.73M2 — SIGNIFICANT CHANGE UP
GLUCOSE SERPL-MCNC: 101 MG/DL — HIGH (ref 70–99)
GRAM STN FLD: ABNORMAL
HCT VFR BLD CALC: 37.9 % — LOW (ref 39–50)
HGB BLD-MCNC: 12.3 G/DL — LOW (ref 13–17)
MCHC RBC-ENTMCNC: 32 PG — SIGNIFICANT CHANGE UP (ref 27–34)
MCHC RBC-ENTMCNC: 32.5 GM/DL — SIGNIFICANT CHANGE UP (ref 32–36)
MCV RBC AUTO: 98.7 FL — SIGNIFICANT CHANGE UP (ref 80–100)
METHOD TYPE: SIGNIFICANT CHANGE UP
METHOD TYPE: SIGNIFICANT CHANGE UP
NRBC # BLD: 0 /100 WBCS — SIGNIFICANT CHANGE UP (ref 0–0)
ORGANISM # SPEC MICROSCOPIC CNT: ABNORMAL
ORGANISM # SPEC MICROSCOPIC CNT: ABNORMAL
ORGANISM # SPEC MICROSCOPIC CNT: SIGNIFICANT CHANGE UP
PLATELET # BLD AUTO: 238 K/UL — SIGNIFICANT CHANGE UP (ref 150–400)
POTASSIUM SERPL-MCNC: 4.2 MMOL/L — SIGNIFICANT CHANGE UP (ref 3.5–5.3)
POTASSIUM SERPL-SCNC: 4.2 MMOL/L — SIGNIFICANT CHANGE UP (ref 3.5–5.3)
RBC # BLD: 3.84 M/UL — LOW (ref 4.2–5.8)
RBC # FLD: 13.3 % — SIGNIFICANT CHANGE UP (ref 10.3–14.5)
SODIUM SERPL-SCNC: 135 MMOL/L — SIGNIFICANT CHANGE UP (ref 135–145)
SPECIMEN SOURCE: SIGNIFICANT CHANGE UP
VANCOMYCIN TROUGH SERPL-MCNC: 13.4 UG/ML — SIGNIFICANT CHANGE UP (ref 10–20)
WBC # BLD: 4.48 K/UL — SIGNIFICANT CHANGE UP (ref 3.8–10.5)
WBC # FLD AUTO: 4.48 K/UL — SIGNIFICANT CHANGE UP (ref 3.8–10.5)

## 2024-02-14 PROCEDURE — 99232 SBSQ HOSP IP/OBS MODERATE 35: CPT

## 2024-02-14 PROCEDURE — 99233 SBSQ HOSP IP/OBS HIGH 50: CPT

## 2024-02-14 RX ORDER — FLUCONAZOLE 150 MG/1
TABLET ORAL
Refills: 0 | Status: DISCONTINUED | OUTPATIENT
Start: 2024-02-14 | End: 2024-02-15

## 2024-02-14 RX ORDER — CEFEPIME 1 G/1
2000 INJECTION, POWDER, FOR SOLUTION INTRAMUSCULAR; INTRAVENOUS EVERY 8 HOURS
Refills: 0 | Status: DISCONTINUED | OUTPATIENT
Start: 2024-02-15 | End: 2024-02-16

## 2024-02-14 RX ORDER — TAMSULOSIN HYDROCHLORIDE 0.4 MG/1
0.4 CAPSULE ORAL AT BEDTIME
Refills: 0 | Status: DISCONTINUED | OUTPATIENT
Start: 2024-02-14 | End: 2024-02-17

## 2024-02-14 RX ORDER — CEFEPIME 1 G/1
2000 INJECTION, POWDER, FOR SOLUTION INTRAMUSCULAR; INTRAVENOUS ONCE
Refills: 0 | Status: COMPLETED | OUTPATIENT
Start: 2024-02-14 | End: 2024-02-14

## 2024-02-14 RX ORDER — FLUCONAZOLE 150 MG/1
400 TABLET ORAL EVERY 24 HOURS
Refills: 0 | Status: DISCONTINUED | OUTPATIENT
Start: 2024-02-15 | End: 2024-02-15

## 2024-02-14 RX ORDER — FLUCONAZOLE 150 MG/1
400 TABLET ORAL ONCE
Refills: 0 | Status: COMPLETED | OUTPATIENT
Start: 2024-02-14 | End: 2024-02-14

## 2024-02-14 RX ORDER — CEFEPIME 1 G/1
INJECTION, POWDER, FOR SOLUTION INTRAMUSCULAR; INTRAVENOUS
Refills: 0 | Status: DISCONTINUED | OUTPATIENT
Start: 2024-02-14 | End: 2024-02-16

## 2024-02-14 RX ADMIN — PIPERACILLIN AND TAZOBACTAM 25 GRAM(S): 4; .5 INJECTION, POWDER, LYOPHILIZED, FOR SOLUTION INTRAVENOUS at 00:36

## 2024-02-14 RX ADMIN — CEFEPIME 100 MILLIGRAM(S): 1 INJECTION, POWDER, FOR SOLUTION INTRAMUSCULAR; INTRAVENOUS at 22:54

## 2024-02-14 RX ADMIN — TAMSULOSIN HYDROCHLORIDE 0.4 MILLIGRAM(S): 0.4 CAPSULE ORAL at 21:37

## 2024-02-14 RX ADMIN — Medication 15 MILLIGRAM(S): at 00:36

## 2024-02-14 RX ADMIN — PANTOPRAZOLE SODIUM 40 MILLIGRAM(S): 20 TABLET, DELAYED RELEASE ORAL at 06:43

## 2024-02-14 RX ADMIN — POLYETHYLENE GLYCOL 3350 17 GRAM(S): 17 POWDER, FOR SOLUTION ORAL at 21:38

## 2024-02-14 RX ADMIN — Medication 166.67 MILLIGRAM(S): at 06:43

## 2024-02-14 RX ADMIN — OXYCODONE HYDROCHLORIDE 5 MILLIGRAM(S): 5 TABLET ORAL at 23:54

## 2024-02-14 RX ADMIN — OXYCODONE HYDROCHLORIDE 5 MILLIGRAM(S): 5 TABLET ORAL at 07:43

## 2024-02-14 RX ADMIN — Medication 15 MILLIGRAM(S): at 06:44

## 2024-02-14 RX ADMIN — FLUCONAZOLE 100 MILLIGRAM(S): 150 TABLET ORAL at 22:54

## 2024-02-14 RX ADMIN — PRIMIDONE 150 MILLIGRAM(S): 250 TABLET ORAL at 21:37

## 2024-02-14 RX ADMIN — Medication 15 MILLIGRAM(S): at 17:36

## 2024-02-14 RX ADMIN — Medication 15 MILLIGRAM(S): at 13:13

## 2024-02-14 RX ADMIN — SENNA PLUS 2 TABLET(S): 8.6 TABLET ORAL at 21:37

## 2024-02-14 RX ADMIN — OXYCODONE HYDROCHLORIDE 5 MILLIGRAM(S): 5 TABLET ORAL at 22:54

## 2024-02-14 RX ADMIN — Medication 166.67 MILLIGRAM(S): at 18:25

## 2024-02-14 RX ADMIN — OXYCODONE HYDROCHLORIDE 5 MILLIGRAM(S): 5 TABLET ORAL at 06:43

## 2024-02-14 RX ADMIN — ESCITALOPRAM OXALATE 20 MILLIGRAM(S): 10 TABLET, FILM COATED ORAL at 21:37

## 2024-02-14 RX ADMIN — PIPERACILLIN AND TAZOBACTAM 25 GRAM(S): 4; .5 INJECTION, POWDER, LYOPHILIZED, FOR SOLUTION INTRAVENOUS at 17:37

## 2024-02-14 NOTE — PROGRESS NOTE ADULT - SUBJECTIVE AND OBJECTIVE BOX
Procedure: R thumb i/d   Surgeon: ronda     Pt comfortable without complaints, pain controlled . he endorses some mild dec sensation at the volar pad of his R thumb distal to iPJ   Denies CP, SOB, N/V, numbness/tingling     Vital Signs Last 24 Hrs  T(C): 36.4 (02-12-24 @ 23:25), Max: 36.5 (02-12-24 @ 17:45)  T(F): 97.6 (02-12-24 @ 23:25), Max: 97.7 (02-12-24 @ 17:45)  HR: 80 (02-12-24 @ 23:25) (56 - 80)  BP: 151/74 (02-12-24 @ 23:25) (149/78 - 210/96)  BP(mean): 113 (02-12-24 @ 22:51) (113 - 138)  RR: 18 (02-12-24 @ 23:25) (11 - 19)  SpO2: 96% (02-12-24 @ 23:25) (94% - 99%)    General: Pt Alert and oriented, NAD  R hand elev in rodriguez block   Pulses: unable to assess, thumb and fingers wwp, <2 sec cap refill   Sensation: silt r/u distribution of R thumb and symmetric to L thumb, silt m/u/r distribution   Motor: he is able to wiggle his R thumb volar/dorsal but limited 2/2 pain, FDP/extensor mechanism for digits 2-5 intact         A/P: 63yMale s/p R thumb/carpal tunnel/parona space i/d  by Dr. ELIZABET Russo on 02-12  - Stable  - dsg changed on 2/14AM   - Pain Control  - DVT ppx: scd  - Post op abx: vanc/zosyn  - f/u OR cx - staph epi   - f/u ID consult   - WBS: nwb rue   - OT eval     Ortho Pager [phone]

## 2024-02-14 NOTE — PROGRESS NOTE ADULT - ASSESSMENT
64 yo M with HLD, essential tremors, s/p L EDUAR and anxiety s/p R trigger thumb release on 1/2/24 c/b SSI.  Afebrile, WBC 9.6.  He is on vanc and pip-tazo.  Obtained culture results from [facility] from 1/28 - heavy growth of Enterobacter cloacae S cefepime, and Staph epi, mod growth of Candida albicans.  Would target these while cultures are pending.  Cultures from the OR are growing Staph epi (4/4) and most have a second organism. Vancomycin trough is appropriately timed and suitable for SSTI  Suggest:  - Please send blood cultures X 2 sets, CMP - need to see liver enzymes  - F/U OR cultures from 2/12 for additional growth  - Continue vancomycin 1250 mg IV q12h.    - D/C pip-tazo  - Start cefepime 2 g IV q8h  - Please check QTc.  If <490, start fluconazole 400 mg po q24h.  Recommendations discussed with primary team - will follow with you - team 2.  Dr. Toro will assume care tomorrow.

## 2024-02-14 NOTE — PROGRESS NOTE ADULT - SUBJECTIVE AND OBJECTIVE BOX
Subjective:  No acute events overnight.  Patient is doing well today without new complaints.  Denies HA, CP, SOB, abdominal pain, nausea, vomiting, fever, chills or diarrhea.     Objective:   Vital Signs Last 24 Hrs  T(C): 36.6 (14 Feb 2024 05:36), Max: 36.8 (13 Feb 2024 20:40)  T(F): 97.8 (14 Feb 2024 05:36), Max: 98.2 (13 Feb 2024 20:40)  HR: 62 (14 Feb 2024 05:36) (60 - 67)  BP: 124/73 (14 Feb 2024 05:36) (124/73 - 148/80)  BP(mean): --  RR: 17 (14 Feb 2024 05:36) (17 - 18)  SpO2: 96% (14 Feb 2024 05:36) (96% - 99%)    Parameters below as of 14 Feb 2024 05:36  Patient On (Oxygen Delivery Method): room air    Physical Exam:   -Gen: NAD, resting in bed  -HEENT: EOMI, PERRL, no scleral icterus  -CV: normal S1 and S2  -Lungs: CTABL, normal respiratory effort on RA  -Ab: soft, NT, ND, normal BS  -Ext: no LE edema, right hand wrapped and elevated  -Neuro: A&O x 3, no focal deficits     Labs:                        12.3   4.48  )-----------( 238      ( 14 Feb 2024 05:30 )             37.9       02-14    135  |  106  |  18  ----------------------------<  101<H>  4.2   |  24  |  0.94    Ca    8.7      14 Feb 2024 05:30    Medications:  MEDICATIONS  (STANDING):  chlorhexidine 2% Cloths 1 Application(s) Topical once  escitalopram 20 milliGRAM(s) Oral at bedtime  ketorolac   Injectable 15 milliGRAM(s) IV Push every 6 hours  pantoprazole    Tablet 40 milliGRAM(s) Oral before breakfast  piperacillin/tazobactam IVPB.. 3.375 Gram(s) IV Intermittent every 8 hours  polyethylene glycol 3350 17 Gram(s) Oral at bedtime  povidone iodine 5% Nasal Swab 1 Application(s) Both Nostrils once  primidone 150 milliGRAM(s) Oral at bedtime  senna 2 Tablet(s) Oral at bedtime  vancomycin  IVPB 1250 milliGRAM(s) IV Intermittent every 12 hours    MEDICATIONS  (PRN):  acetaminophen     Tablet .. 1000 milliGRAM(s) Oral every 8 hours PRN Mild Pain (1 - 3)  HYDROmorphone  Injectable 0.5 milliGRAM(s) IV Push every 3 hours PRN breakthrough pain on the floor  oxyCODONE    IR 5 milliGRAM(s) Oral every 4 hours PRN Severe Pain (7 - 10)  prochlorperazine   Injectable 5 milliGRAM(s) IV Push once PRN Nausea and/or Vomiting

## 2024-02-14 NOTE — PROGRESS NOTE ADULT - SUBJECTIVE AND OBJECTIVE BOX
INTERVAL HPI/OVERNIGHT EVENTS:  Afebrile.  Has R thumb pain    CONSTITUTIONAL:  No fever, chills, night sweats  EYES:  No photophobia or visual changes  CARDIOVASCULAR:  No chest pain  RESPIRATORY:  No cough, wheezing, or SOB   GASTROINTESTINAL:  No nausea, vomiting, diarrhea, constipation, or abdominal pain  GENITOURINARY:  No frequency, urgency, dysuria or hematuria  NEUROLOGIC:  No headache, lightheadedness      ANTIBIOTICS/RELEVANT:    Vancomycin 1250 mg IV q12h (2/12-present)  Pip-tazo 3.375 g IV q8h (2/13-present)    Vital Signs Last 24 Hrs  T(C): 36.8 (14 Feb 2024 16:20), Max: 36.8 (13 Feb 2024 20:40)  T(F): 98.2 (14 Feb 2024 16:20), Max: 98.2 (13 Feb 2024 20:40)  HR: 55 (14 Feb 2024 16:31) (53 - 62)  BP: 163/82 (14 Feb 2024 16:20) (124/73 - 163/82)  BP(mean): 106 (14 Feb 2024 10:30) (106 - 106)  RR: 17 (14 Feb 2024 16:20) (17 - 18)  SpO2: 98% (14 Feb 2024 16:20) (75% - 98%)    Parameters below as of 14 Feb 2024 16:20  Patient On (Oxygen Delivery Method): room air        PHYSICAL EXAM:  Constitutional:  Alert  HEENT:  NC, Sclerae anicteric, conjunctivae clear, PERRL.  No nasal exudate or sinus tenderness;  No buccal mucosal lesions, no pharyngeal erythema or exudate	  Neck:  Supple, no adenopathy  Respiratory:  Clear bilaterally  Cardiovascular:  RRR, S1S2, no murmur appreciated  Gastrointestinal:  Symmetric, normoactive BS, soft, NT, no masses, guarding or rebound.  No HSM  Extremities:  No edema.  R hand dressed      LABS:                        12.3   4.48  )-----------( 238      ( 14 Feb 2024 05:30 )             37.9         02-14    135  |  106  |  18  ----------------------------<  101<H>  4.2   |  24  |  0.94    Ca    8.7      14 Feb 2024 05:30    Vancomycin 13.4 (2/14@17:12, prior dose 06:42)    Urinalysis Basic - ( 14 Feb 2024 05:30 )    Color: x / Appearance: x / SG: x / pH: x  Gluc: 101 mg/dL / Ketone: x  / Bili: x / Urobili: x   Blood: x / Protein: x / Nitrite: x   Leuk Esterase: x / RBC: x / WBC x   Sq Epi: x / Non Sq Epi: x / Bacteria: x        MICROBIOLOGY:    OR cultures X 4:  1/4 with Staph epi  AFB and fungal cultures    RADIOLOGY & ADDITIONAL STUDIES:

## 2024-02-14 NOTE — PROGRESS NOTE ADULT - SUBJECTIVE AND OBJECTIVE BOX
POST OPERATIVE DAY #:   STATUS POST: Left    Right  TKA/THR                        SUBJECTIVE: Patient seen and examined.   Denies any sob/cp/n/v/numbness or tingling in b/l     OBJECTIVE:     Vital Signs Last 24 Hrs  T(C): 36.6 (14 Feb 2024 05:36), Max: 36.8 (13 Feb 2024 20:40)  T(F): 97.8 (14 Feb 2024 05:36), Max: 98.2 (13 Feb 2024 20:40)  HR: 62 (14 Feb 2024 05:36) (60 - 67)  BP: 124/73 (14 Feb 2024 05:36) (124/73 - 148/80)  BP(mean): --  RR: 17 (14 Feb 2024 05:36) (17 - 18)  SpO2: 96% (14 Feb 2024 05:36) (96% - 99%)    Parameters below as of 14 Feb 2024 05:36  Patient On (Oxygen Delivery Method): room air        General:  Affected extremity:   Dressing: clean/dry/intact   Sensation: intact to light touch to patient's baseline  Motor exam: EHL/TA/GS    Pulses             I&O's Detail    13 Feb 2024 07:01  -  14 Feb 2024 07:00  --------------------------------------------------------  IN:    IV PiggyBack: 175 mL    IV PiggyBack: 250 mL    Oral Fluid: 1560 mL  Total IN: 1985 mL    OUT:    Voided (mL): 5000 mL  Total OUT: 5000 mL    Total NET: -3015 mL          LABS:                        12.3   4.48  )-----------( 238      ( 14 Feb 2024 05:30 )             37.9     02-14    135  |  106  |  18  ----------------------------<  101<H>  4.2   |  24  |  0.94    Ca    8.7      14 Feb 2024 05:30      PT/INR - ( 12 Feb 2024 14:20 )   PT: 11.4 sec;   INR: 1.00          PTT - ( 12 Feb 2024 14:20 )  PTT:28.3 sec  Urinalysis Basic - ( 14 Feb 2024 05:30 )    Color: x / Appearance: x / SG: x / pH: x  Gluc: 101 mg/dL / Ketone: x  / Bili: x / Urobili: x   Blood: x / Protein: x / Nitrite: x   Leuk Esterase: x / RBC: x / WBC x   Sq Epi: x / Non Sq Epi: x / Bacteria: x        MEDICATIONS:  piperacillin/tazobactam IVPB.. 3.375 Gram(s) IV Intermittent every 8 hours  vancomycin  IVPB 1250 milliGRAM(s) IV Intermittent every 12 hours    acetaminophen     Tablet .. 1000 milliGRAM(s) Oral every 8 hours PRN  escitalopram 20 milliGRAM(s) Oral at bedtime  HYDROmorphone  Injectable 0.5 milliGRAM(s) IV Push every 3 hours PRN  ketorolac   Injectable 15 milliGRAM(s) IV Push every 6 hours  oxyCODONE    IR 5 milliGRAM(s) Oral every 4 hours PRN  primidone 150 milliGRAM(s) Oral at bedtime  prochlorperazine   Injectable 5 milliGRAM(s) IV Push once PRN          ASSESSMENT AND PLAN: 64yo Male s/p     1. Analgesic pain control  2. DVT prophylaxis: ASA      HSQ       Coumadin      Lovenox      SCDs      Other:   3. Weight Bearing Status:  Weight bearing as tolerated       NWB         Partial Weight Bearing  4. Disposition: Home          Subacute Rehab      pending PT evaluation POST OPERATIVE DAY #: 2  STATUS POST: Right thumb I&D                     SUBJECTIVE: Patient seen and examined. Pt. states he is doing well, likes the food here, thumb is improving.   Denies any sob/cp/n/v/numbness or tingling in b/l ues.     OBJECTIVE:     Vital Signs Last 24 Hrs  T(C): 36.6 (14 Feb 2024 05:36), Max: 36.8 (13 Feb 2024 20:40)  T(F): 97.8 (14 Feb 2024 05:36), Max: 98.2 (13 Feb 2024 20:40)  HR: 62 (14 Feb 2024 05:36) (60 - 67)  BP: 124/73 (14 Feb 2024 05:36) (124/73 - 148/80)  BP(mean): --  RR: 17 (14 Feb 2024 05:36) (17 - 18)  SpO2: 96% (14 Feb 2024 05:36) (96% - 99%)    Parameters below as of 14 Feb 2024 05:36  Patient On (Oxygen Delivery Method): room air        General: NAD   Affected extremity:  right UE   Dressing: clean/dry/intact   Sensation: intact to light touch to patient's baseline  Motor exam: slt intact,  brachial/radial pulses 2+, biceps/triceps/delts, , finger int   5/5  Pulses 2+             I&O's Detail    13 Feb 2024 07:01  -  14 Feb 2024 07:00  --------------------------------------------------------  IN:    IV PiggyBack: 175 mL    IV PiggyBack: 250 mL    Oral Fluid: 1560 mL  Total IN: 1985 mL    OUT:    Voided (mL): 5000 mL  Total OUT: 5000 mL    Total NET: -3015 mL          LABS:                        12.3   4.48  )-----------( 238      ( 14 Feb 2024 05:30 )             37.9     02-14    135  |  106  |  18  ----------------------------<  101<H>  4.2   |  24  |  0.94    Ca    8.7      14 Feb 2024 05:30      PT/INR - ( 12 Feb 2024 14:20 )   PT: 11.4 sec;   INR: 1.00          PTT - ( 12 Feb 2024 14:20 )  PTT:28.3 sec  Urinalysis Basic - ( 14 Feb 2024 05:30 )    Color: x / Appearance: x / SG: x / pH: x  Gluc: 101 mg/dL / Ketone: x  / Bili: x / Urobili: x   Blood: x / Protein: x / Nitrite: x   Leuk Esterase: x / RBC: x / WBC x   Sq Epi: x / Non Sq Epi: x / Bacteria: x        MEDICATIONS:  piperacillin/tazobactam IVPB.. 3.375 Gram(s) IV Intermittent every 8 hours  vancomycin  IVPB 1250 milliGRAM(s) IV Intermittent every 12 hours    acetaminophen     Tablet .. 1000 milliGRAM(s) Oral every 8 hours PRN  escitalopram 20 milliGRAM(s) Oral at bedtime  HYDROmorphone  Injectable 0.5 milliGRAM(s) IV Push every 3 hours PRN  ketorolac   Injectable 15 milliGRAM(s) IV Push every 6 hours  oxyCODONE    IR 5 milliGRAM(s) Oral every 4 hours PRN  primidone 150 milliGRAM(s) Oral at bedtime  prochlorperazine   Injectable 5 milliGRAM(s) IV Push once PRN          ASSESSMENT AND PLAN: 64yo Male s/p right thumb I&D    1. Analgesic pain control  2. DVT prophylaxis: scds   3. Weight Bearing Status:  nwb right ue in rodriguez block with elevation   4. Disposition: Home cleared OT  5. ID- currently growing staph epi on vanc/zosyn. Vanc trough today at 5pm will f/u. Blood cx x 2 done today. Pending ID final recs.

## 2024-02-14 NOTE — PROGRESS NOTE ADULT - ASSESSMENT
63-year-old male with a PMHx of essential tremors, anxiety, HLD and recent trigger finger release (c/b surgical site infection, s/p I&D and IV Abx) who presented for washout.      #Surgical Site Infection    -further management as per ortho, s/p I&D with hematoma evacuation on 2/12    -ID consulted, continue with vancomycin and zosyn pending further culture data    -OR Cx (2/12): staph epi  -BCx pending   -OT consulted, no skilled OT needs     #Essential Tremor    -continue with Primidone 150mg qhs      #Anxiety     -continue with Lexapro 20mg daily       #BPH    -continue with tamsulosin 0.4mg qhs       DVT PPx: SCDs    Dispo: home

## 2024-02-15 LAB
-  CLINDAMYCIN: SIGNIFICANT CHANGE UP
-  ERYTHROMYCIN: SIGNIFICANT CHANGE UP
-  LINEZOLID: SIGNIFICANT CHANGE UP
-  OXACILLIN: SIGNIFICANT CHANGE UP
-  RIFAMPIN: SIGNIFICANT CHANGE UP
-  TRIMETHOPRIM/SULFAMETHOXAZOLE: SIGNIFICANT CHANGE UP
-  VANCOMYCIN: SIGNIFICANT CHANGE UP
ADD ON TEST-SPECIMEN IN LAB: SIGNIFICANT CHANGE UP
ALBUMIN SERPL ELPH-MCNC: 3.7 G/DL — SIGNIFICANT CHANGE UP (ref 3.3–5)
ALP SERPL-CCNC: 77 U/L — SIGNIFICANT CHANGE UP (ref 40–120)
ALT FLD-CCNC: 11 U/L — SIGNIFICANT CHANGE UP (ref 10–45)
ANION GAP SERPL CALC-SCNC: 7 MMOL/L — SIGNIFICANT CHANGE UP (ref 5–17)
AST SERPL-CCNC: 19 U/L — SIGNIFICANT CHANGE UP (ref 10–40)
BILIRUB DIRECT SERPL-MCNC: <0.2 MG/DL — SIGNIFICANT CHANGE UP (ref 0–0.3)
BILIRUB INDIRECT FLD-MCNC: SIGNIFICANT CHANGE UP MG/DL (ref 0.2–1)
BILIRUB SERPL-MCNC: 0.2 MG/DL — SIGNIFICANT CHANGE UP (ref 0.2–1.2)
BUN SERPL-MCNC: 14 MG/DL — SIGNIFICANT CHANGE UP (ref 7–23)
CALCIUM SERPL-MCNC: 9.5 MG/DL — SIGNIFICANT CHANGE UP (ref 8.4–10.5)
CHLORIDE SERPL-SCNC: 104 MMOL/L — SIGNIFICANT CHANGE UP (ref 96–108)
CO2 SERPL-SCNC: 28 MMOL/L — SIGNIFICANT CHANGE UP (ref 22–31)
CREAT SERPL-MCNC: 0.98 MG/DL — SIGNIFICANT CHANGE UP (ref 0.5–1.3)
CULTURE RESULTS: ABNORMAL
EGFR: 88 ML/MIN/1.73M2 — SIGNIFICANT CHANGE UP
GLUCOSE SERPL-MCNC: 117 MG/DL — HIGH (ref 70–99)
HCT VFR BLD CALC: 41.2 % — SIGNIFICANT CHANGE UP (ref 39–50)
HGB BLD-MCNC: 13.4 G/DL — SIGNIFICANT CHANGE UP (ref 13–17)
MCHC RBC-ENTMCNC: 32.4 PG — SIGNIFICANT CHANGE UP (ref 27–34)
MCHC RBC-ENTMCNC: 32.5 GM/DL — SIGNIFICANT CHANGE UP (ref 32–36)
MCV RBC AUTO: 99.5 FL — SIGNIFICANT CHANGE UP (ref 80–100)
METHOD TYPE: SIGNIFICANT CHANGE UP
NRBC # BLD: 0 /100 WBCS — SIGNIFICANT CHANGE UP (ref 0–0)
ORGANISM # SPEC MICROSCOPIC CNT: ABNORMAL
ORGANISM # SPEC MICROSCOPIC CNT: SIGNIFICANT CHANGE UP
PLATELET # BLD AUTO: 267 K/UL — SIGNIFICANT CHANGE UP (ref 150–400)
POTASSIUM SERPL-MCNC: 4.5 MMOL/L — SIGNIFICANT CHANGE UP (ref 3.5–5.3)
POTASSIUM SERPL-SCNC: 4.5 MMOL/L — SIGNIFICANT CHANGE UP (ref 3.5–5.3)
PROT SERPL-MCNC: 6.7 G/DL — SIGNIFICANT CHANGE UP (ref 6–8.3)
RBC # BLD: 4.14 M/UL — LOW (ref 4.2–5.8)
RBC # FLD: 13.3 % — SIGNIFICANT CHANGE UP (ref 10.3–14.5)
SODIUM SERPL-SCNC: 139 MMOL/L — SIGNIFICANT CHANGE UP (ref 135–145)
SPECIMEN SOURCE: SIGNIFICANT CHANGE UP
WBC # BLD: 4.47 K/UL — SIGNIFICANT CHANGE UP (ref 3.8–10.5)
WBC # FLD AUTO: 4.47 K/UL — SIGNIFICANT CHANGE UP (ref 3.8–10.5)

## 2024-02-15 PROCEDURE — 99232 SBSQ HOSP IP/OBS MODERATE 35: CPT

## 2024-02-15 PROCEDURE — 99233 SBSQ HOSP IP/OBS HIGH 50: CPT

## 2024-02-15 RX ORDER — FLUCONAZOLE 150 MG/1
400 TABLET ORAL DAILY
Refills: 0 | Status: DISCONTINUED | OUTPATIENT
Start: 2024-02-15 | End: 2024-02-17

## 2024-02-15 RX ORDER — NICOTINE POLACRILEX 2 MG
1 GUM BUCCAL DAILY
Refills: 0 | Status: DISCONTINUED | OUTPATIENT
Start: 2024-02-15 | End: 2024-02-17

## 2024-02-15 RX ADMIN — ESCITALOPRAM OXALATE 20 MILLIGRAM(S): 10 TABLET, FILM COATED ORAL at 22:40

## 2024-02-15 RX ADMIN — Medication 15 MILLIGRAM(S): at 00:22

## 2024-02-15 RX ADMIN — Medication 15 MILLIGRAM(S): at 23:39

## 2024-02-15 RX ADMIN — Medication 15 MILLIGRAM(S): at 17:08

## 2024-02-15 RX ADMIN — Medication 166.67 MILLIGRAM(S): at 17:21

## 2024-02-15 RX ADMIN — TAMSULOSIN HYDROCHLORIDE 0.4 MILLIGRAM(S): 0.4 CAPSULE ORAL at 22:40

## 2024-02-15 RX ADMIN — PANTOPRAZOLE SODIUM 40 MILLIGRAM(S): 20 TABLET, DELAYED RELEASE ORAL at 06:50

## 2024-02-15 RX ADMIN — Medication 166.67 MILLIGRAM(S): at 05:37

## 2024-02-15 RX ADMIN — POLYETHYLENE GLYCOL 3350 17 GRAM(S): 17 POWDER, FOR SOLUTION ORAL at 22:41

## 2024-02-15 RX ADMIN — CEFEPIME 100 MILLIGRAM(S): 1 INJECTION, POWDER, FOR SOLUTION INTRAMUSCULAR; INTRAVENOUS at 22:39

## 2024-02-15 RX ADMIN — Medication 1 PATCH: at 16:47

## 2024-02-15 RX ADMIN — SENNA PLUS 2 TABLET(S): 8.6 TABLET ORAL at 22:41

## 2024-02-15 RX ADMIN — CEFEPIME 100 MILLIGRAM(S): 1 INJECTION, POWDER, FOR SOLUTION INTRAMUSCULAR; INTRAVENOUS at 14:10

## 2024-02-15 RX ADMIN — Medication 15 MILLIGRAM(S): at 17:01

## 2024-02-15 RX ADMIN — FLUCONAZOLE 400 MILLIGRAM(S): 150 TABLET ORAL at 17:02

## 2024-02-15 RX ADMIN — Medication 15 MILLIGRAM(S): at 05:38

## 2024-02-15 RX ADMIN — CEFEPIME 100 MILLIGRAM(S): 1 INJECTION, POWDER, FOR SOLUTION INTRAMUSCULAR; INTRAVENOUS at 07:32

## 2024-02-15 RX ADMIN — PRIMIDONE 150 MILLIGRAM(S): 250 TABLET ORAL at 22:45

## 2024-02-15 RX ADMIN — Medication 1 PATCH: at 16:54

## 2024-02-15 NOTE — PROGRESS NOTE ADULT - SUBJECTIVE AND OBJECTIVE BOX
Ortho Note    Surgery: 63y Male s/p right trigger thumb release on 1/2/24 c/b SSI RTOR for I&D on 2/12    Patient seen and examined at bedside this afternoon   Pt states pain significantly improved and ROM improved   Denies CP, SOB, N/V, numbness/tingling     Vital Signs Last 24 Hrs  T(C): 37 (02-15-24 @ 14:31), Max: 37 (02-15-24 @ 14:31)  T(F): 98.6 (02-15-24 @ 14:31), Max: 98.6 (02-15-24 @ 14:31)  HR: 56 (02-15-24 @ 14:31) (56 - 56)  BP: 150/83 (02-15-24 @ 14:31) (150/83 - 150/83)  BP(mean): --  RR: 17 (02-15-24 @ 14:31) (17 - 17)  SpO2: 99% (02-15-24 @ 14:31) (99% - 99%)  AVSS    General: Pt Alert and oriented, NAD  Dressing C/D/I Right upper extremity- changed this AM by Dr. Russo   extremity warm and well perfused   Full AROM of digits and wrist right hand without pain         A/P: 63yMale POD#3 s/p RTOR for I&D on 2/12; index procedure right trigger thumb release on 1/2/24 c/b SSI     - Labs and vitals stable, blood Cx no growth to date, OR cx growing staph epi   - Per outpatient cultures reviewed by ID, initial cultures at [facility] growing staph epi/enterobacter/candida albicans  - c/w vancomycin, cefepime, PO fluconazole IP, plan to switch to daptomycin, ertapenen upon dc  - Patient for midline placement once blood cx negative x48 hours, 2/16 by 12pm   - Pain Control- patient reports pain improved   - DVT ppx: SCDs  - PT, WBS: NWB RUE, elevation in rodriguez block pillow  - Dispo: dc home with home infusion pending Midline placement and final ID recs     Ortho Pager [phone]

## 2024-02-15 NOTE — PROGRESS NOTE ADULT - SUBJECTIVE AND OBJECTIVE BOX
Subjective:  No acute events overnight.  Patient is doing well today without new complaints.  Pain is well controlled. Denies HA, CP, SOB, abdominal pain, nausea, vomiting, fever, chills or diarrhea.     Objective:   Vital Signs Last 24 Hrs  T(C): 36.6 (15 Feb 2024 08:57), Max: 36.8 (14 Feb 2024 16:20)  T(F): 97.9 (15 Feb 2024 08:57), Max: 98.2 (14 Feb 2024 16:20)  HR: 67 (15 Feb 2024 08:57) (53 - 67)  BP: 137/87 (15 Feb 2024 08:57) (137/87 - 163/82)  BP(mean): --  RR: 17 (15 Feb 2024 08:57) (17 - 17)  SpO2: 97% (15 Feb 2024 08:57) (97% - 98%)    Parameters below as of 15 Feb 2024 08:57  Patient On (Oxygen Delivery Method): room air    Physical Exam:   -Gen: NAD, resting in bed  -HEENT: EOMI, PERRL, no scleral icterus  -CV: normal S1 and S2  -Lungs: CTABL, normal respiratory effort on RA  -Ab: soft, NT, ND, normal BS  -Ext: no LE edema, right hand wrapped   -Neuro: A&O x 3, no focal deficits      Labs:                        13.4   4.47  )-----------( 267      ( 15 Feb 2024 07:01 )             41.2       02-15    139  |  104  |  14  ----------------------------<  117<H>  4.5   |  28  |  0.98    Ca    9.5      15 Feb 2024 07:01    TPro  6.7  /  Alb  3.7  /  TBili  0.2  /  DBili  <0.2  /  AST  19  /  ALT  11  /  AlkPhos  77  02-15    Microbiology:     Culture - Blood (collected 02-14-24 @ 11:16)  Source: .Blood Blood-Peripheral  Preliminary Report (02-15-24 @ 12:01):    No growth at 1 day.    Culture - Blood (collected 02-14-24 @ 11:16)  Source: .Blood Blood-Peripheral  Preliminary Report (02-15-24 @ 12:01):    No growth at 1 day.    Medications:  MEDICATIONS  (STANDING):  cefepime   IVPB      cefepime   IVPB 2000 milliGRAM(s) IV Intermittent every 8 hours  chlorhexidine 2% Cloths 1 Application(s) Topical once  escitalopram 20 milliGRAM(s) Oral at bedtime  fluconAZOLE IVPB      fluconAZOLE IVPB 400 milliGRAM(s) IV Intermittent every 24 hours  ketorolac   Injectable 15 milliGRAM(s) IV Push every 6 hours  pantoprazole    Tablet 40 milliGRAM(s) Oral before breakfast  polyethylene glycol 3350 17 Gram(s) Oral at bedtime  povidone iodine 5% Nasal Swab 1 Application(s) Both Nostrils once  primidone 150 milliGRAM(s) Oral at bedtime  senna 2 Tablet(s) Oral at bedtime  tamsulosin 0.4 milliGRAM(s) Oral at bedtime  vancomycin  IVPB 1250 milliGRAM(s) IV Intermittent every 12 hours    MEDICATIONS  (PRN):  acetaminophen     Tablet .. 1000 milliGRAM(s) Oral every 8 hours PRN Mild Pain (1 - 3)  HYDROmorphone  Injectable 0.5 milliGRAM(s) IV Push every 3 hours PRN breakthrough pain on the floor  oxyCODONE    IR 5 milliGRAM(s) Oral every 4 hours PRN Severe Pain (7 - 10)  prochlorperazine   Injectable 5 milliGRAM(s) IV Push once PRN Nausea and/or Vomiting

## 2024-02-15 NOTE — PROGRESS NOTE ADULT - SUBJECTIVE AND OBJECTIVE BOX
Patient seen and examined today.    He notes significantly improving pain at the right thumb and wrist.  No fevers or chills.  No new numbness or tingling.    Afebrile.  Vital signs are stable.    The right thumb soft bulky dressing was removed.  Palmar thumb  to wrist incision is clean, dry and intact with Prolene sutures in place.  No redness or any worsening swelling.  No expressible drainage.  Sensation is intact to light touch globally throughout the right hand, along the median and ulnar nerve sensory distribution. Firing right APB and FDI with good strength.     A/P:   Postop day 3 status post right thumb FPL sheath, carpal tunnel and Parona space irrigation, debridement and flexor tenosynovectomy.  progressing appropriately.  - Continue IV antibiotics and recommendations by the infectious disease team  - Follow-up cultures and antibiotic sensitivities  - Hand elevation using Semaj block, digital range of motion  - Pain control  - Dispo planning  - DQ dressing changes    Chris Russo MD

## 2024-02-15 NOTE — PROGRESS NOTE ADULT - ASSESSMENT
63-year-old male with a PMHx of essential tremors, anxiety, HLD and recent trigger finger release (c/b surgical site infection) who presented for washout.       #Surgical Site Infection     -further management as per ortho, s/p I&D with hematoma evacuation on 2/12     -ID consulted, continue with vancomycin, Cefepime and Fluconazole, follow up final recommendations    -OR Cx (2/12): staph epi   -BCx (2/14): NGTD    -OT consulted, no skilled OT needs      #Essential Tremor     -continue with Primidone 150mg qhs       #Anxiety      -continue with Lexapro 20mg daily        #BPH     -continue with tamsulosin 0.4mg qhs        DVT PPx: SCDs    Dispo: home

## 2024-02-15 NOTE — PROGRESS NOTE ADULT - SUBJECTIVE AND OBJECTIVE BOX
INFECTIOUS DISEASES CONSULT FOLLOW-UP NOTE    INTERVAL HPI/OVERNIGHT EVENTS:  Afebrile, vitals normal, WBC normal, tolerating abx  Improving R thumb ROM, decreased swelling/pain    ROS:   Constitutional, eyes, ENT, cardiovascular, respiratory, gastrointestinal, genitourinary, integumentary, neurological, psychiatric and heme/lymph are otherwise negative other than noted above       ANTIBIOTICS/RELEVANT:    MEDICATIONS  (STANDING):  cefepime   IVPB      cefepime   IVPB 2000 milliGRAM(s) IV Intermittent every 8 hours  chlorhexidine 2% Cloths 1 Application(s) Topical once  escitalopram 20 milliGRAM(s) Oral at bedtime  fluconAZOLE   Tablet 400 milliGRAM(s) Oral daily  ketorolac   Injectable 15 milliGRAM(s) IV Push every 6 hours  nicotine - 21 mG/24Hr(s) Patch 1 Patch Transdermal daily  pantoprazole    Tablet 40 milliGRAM(s) Oral before breakfast  polyethylene glycol 3350 17 Gram(s) Oral at bedtime  povidone iodine 5% Nasal Swab 1 Application(s) Both Nostrils once  primidone 150 milliGRAM(s) Oral at bedtime  senna 2 Tablet(s) Oral at bedtime  tamsulosin 0.4 milliGRAM(s) Oral at bedtime  vancomycin  IVPB 1250 milliGRAM(s) IV Intermittent every 12 hours    MEDICATIONS  (PRN):  acetaminophen     Tablet .. 1000 milliGRAM(s) Oral every 8 hours PRN Mild Pain (1 - 3)  HYDROmorphone  Injectable 0.5 milliGRAM(s) IV Push every 3 hours PRN breakthrough pain on the floor  oxyCODONE    IR 5 milliGRAM(s) Oral every 4 hours PRN Severe Pain (7 - 10)  prochlorperazine   Injectable 5 milliGRAM(s) IV Push once PRN Nausea and/or Vomiting        Vital Signs Last 24 Hrs  T(C): 36.7 (15 Feb 2024 20:20), Max: 37 (15 Feb 2024 14:31)  T(F): 98.1 (15 Feb 2024 20:20), Max: 98.6 (15 Feb 2024 14:31)  HR: 62 (15 Feb 2024 20:20) (56 - 67)  BP: 150/75 (15 Feb 2024 20:20) (137/87 - 150/83)  BP(mean): 100 (15 Feb 2024 20:20) (100 - 100)  RR: 16 (15 Feb 2024 20:20) (16 - 17)  SpO2: 96% (15 Feb 2024 20:20) (96% - 99%)    Parameters below as of 15 Feb 2024 20:20  Patient On (Oxygen Delivery Method): room air        02-14-24 @ 07:01  -  02-15-24 @ 07:00  --------------------------------------------------------  IN: 1520 mL / OUT: 4451 mL / NET: -2931 mL    02-15-24 @ 07:01  -  02-15-24 @ 22:24  --------------------------------------------------------  IN: 1380 mL / OUT: 1100 mL / NET: 280 mL      PHYSICAL EXAM:  Constitutional: alert, NAD  Eyes: the sclera and conjunctiva were normal.   ENT: the ears and nose were normal in appearance.   Neck: the appearance of the neck was normal and the neck was supple.   Pulmonary: no respiratory distress and lungs were clear to auscultation bilaterally.   Heart: heart rate was normal and rhythm regular, normal S1 and S2  Vascular:. there was no peripheral edema  Abdomen: normal bowel sounds, soft, non-tender  MSK: R hand dressing c/d/i. Improving R thumb ROM      LABS:                        13.4   4.47  )-----------( 267      ( 15 Feb 2024 07:01 )             41.2     02-15    139  |  104  |  14  ----------------------------<  117<H>  4.5   |  28  |  0.98    Ca    9.5      15 Feb 2024 07:01    TPro  6.7  /  Alb  3.7  /  TBili  0.2  /  DBili  <0.2  /  AST  19  /  ALT  11  /  AlkPhos  77  02-15      Urinalysis Basic - ( 15 Feb 2024 07:01 )    Color: x / Appearance: x / SG: x / pH: x  Gluc: 117 mg/dL / Ketone: x  / Bili: x / Urobili: x   Blood: x / Protein: x / Nitrite: x   Leuk Esterase: x / RBC: x / WBC x   Sq Epi: x / Non Sq Epi: x / Bacteria: x        MICROBIOLOGY:  Reviewed    RADIOLOGY & ADDITIONAL STUDIES:  Reviewed

## 2024-02-15 NOTE — PROGRESS NOTE ADULT - ASSESSMENT
63M w/ HLD, essential tremor, anxiety, s/p L THR, and recent R trigger thumb release 1/2/24 which was complicated by SSI, with recent wound cx from [facility] 1/28 with ECC, S.epi, C.albicans, but was not improving with PO abx and so presented to ED on 2/12, s/p I&D 2/12 with finding of pus in flexor sheath, carpal tunnel and parona's space, cultures with S.epi. Treated with vancomycin, zosyn -> cefepime, and started on fluc 2/14 (QTc 394ms 2/14), with improving R thumb swelling and ROM.    Recommendations  - Continue vancomycin 1250mg IV q12h  - Continue cefepime 2g IV q8h  - Continue fluconazole 400mg daily, please switch IV to PO  - Place midline, anticipate 14 day course from I&D for flexor tenosynovitis, likely with dapto/erta + fluc for ease of administration (more frequent dosing may be difficult with his job).    ID Team 2 will follow

## 2024-02-16 LAB
ADD ON TEST-SPECIMEN IN LAB: SIGNIFICANT CHANGE UP
ALBUMIN SERPL ELPH-MCNC: 3.7 G/DL — SIGNIFICANT CHANGE UP (ref 3.3–5)
ALP SERPL-CCNC: 79 U/L — SIGNIFICANT CHANGE UP (ref 40–120)
ALT FLD-CCNC: 10 U/L — SIGNIFICANT CHANGE UP (ref 10–45)
ANION GAP SERPL CALC-SCNC: 10 MMOL/L — SIGNIFICANT CHANGE UP (ref 5–17)
AST SERPL-CCNC: 17 U/L — SIGNIFICANT CHANGE UP (ref 10–40)
BILIRUB SERPL-MCNC: 0.2 MG/DL — SIGNIFICANT CHANGE UP (ref 0.2–1.2)
BUN SERPL-MCNC: 18 MG/DL — SIGNIFICANT CHANGE UP (ref 7–23)
CALCIUM SERPL-MCNC: 9.2 MG/DL — SIGNIFICANT CHANGE UP (ref 8.4–10.5)
CHLORIDE SERPL-SCNC: 104 MMOL/L — SIGNIFICANT CHANGE UP (ref 96–108)
CK SERPL-CCNC: 68 U/L — SIGNIFICANT CHANGE UP (ref 30–200)
CO2 SERPL-SCNC: 25 MMOL/L — SIGNIFICANT CHANGE UP (ref 22–31)
CREAT SERPL-MCNC: 0.99 MG/DL — SIGNIFICANT CHANGE UP (ref 0.5–1.3)
EGFR: 87 ML/MIN/1.73M2 — SIGNIFICANT CHANGE UP
GLUCOSE SERPL-MCNC: 102 MG/DL — HIGH (ref 70–99)
HCT VFR BLD CALC: 41 % — SIGNIFICANT CHANGE UP (ref 39–50)
HGB BLD-MCNC: 13.5 G/DL — SIGNIFICANT CHANGE UP (ref 13–17)
MCHC RBC-ENTMCNC: 32 PG — SIGNIFICANT CHANGE UP (ref 27–34)
MCHC RBC-ENTMCNC: 32.9 GM/DL — SIGNIFICANT CHANGE UP (ref 32–36)
MCV RBC AUTO: 97.2 FL — SIGNIFICANT CHANGE UP (ref 80–100)
NRBC # BLD: 0 /100 WBCS — SIGNIFICANT CHANGE UP (ref 0–0)
PLATELET # BLD AUTO: 253 K/UL — SIGNIFICANT CHANGE UP (ref 150–400)
POTASSIUM SERPL-MCNC: 4.2 MMOL/L — SIGNIFICANT CHANGE UP (ref 3.5–5.3)
POTASSIUM SERPL-SCNC: 4.2 MMOL/L — SIGNIFICANT CHANGE UP (ref 3.5–5.3)
PROT SERPL-MCNC: 6.7 G/DL — SIGNIFICANT CHANGE UP (ref 6–8.3)
RBC # BLD: 4.22 M/UL — SIGNIFICANT CHANGE UP (ref 4.2–5.8)
RBC # FLD: 13.1 % — SIGNIFICANT CHANGE UP (ref 10.3–14.5)
SODIUM SERPL-SCNC: 139 MMOL/L — SIGNIFICANT CHANGE UP (ref 135–145)
VANCOMYCIN TROUGH SERPL-MCNC: 13.2 UG/ML — SIGNIFICANT CHANGE UP (ref 10–20)
WBC # BLD: 4.82 K/UL — SIGNIFICANT CHANGE UP (ref 3.8–10.5)
WBC # FLD AUTO: 4.82 K/UL — SIGNIFICANT CHANGE UP (ref 3.8–10.5)

## 2024-02-16 PROCEDURE — 99233 SBSQ HOSP IP/OBS HIGH 50: CPT

## 2024-02-16 PROCEDURE — 36569 INSJ PICC 5 YR+ W/O IMAGING: CPT

## 2024-02-16 PROCEDURE — 76937 US GUIDE VASCULAR ACCESS: CPT | Mod: 26,59

## 2024-02-16 PROCEDURE — 99232 SBSQ HOSP IP/OBS MODERATE 35: CPT

## 2024-02-16 RX ORDER — SODIUM CHLORIDE 9 MG/ML
10 INJECTION INTRAMUSCULAR; INTRAVENOUS; SUBCUTANEOUS
Refills: 0 | Status: DISCONTINUED | OUTPATIENT
Start: 2024-02-16 | End: 2024-02-17

## 2024-02-16 RX ORDER — VANCOMYCIN HCL 1 G
1250 VIAL (EA) INTRAVENOUS ONCE
Refills: 0 | Status: COMPLETED | OUTPATIENT
Start: 2024-02-16 | End: 2024-02-16

## 2024-02-16 RX ORDER — FLUCONAZOLE 150 MG/1
2 TABLET ORAL
Qty: 14 | Refills: 0
Start: 2024-02-16 | End: 2024-02-22

## 2024-02-16 RX ORDER — POLYETHYLENE GLYCOL 3350 17 G/17G
17 POWDER, FOR SOLUTION ORAL
Qty: 0 | Refills: 0 | DISCHARGE
Start: 2024-02-16

## 2024-02-16 RX ORDER — ERTAPENEM SODIUM 1 G/1
1000 INJECTION, POWDER, LYOPHILIZED, FOR SOLUTION INTRAMUSCULAR; INTRAVENOUS EVERY 24 HOURS
Refills: 0 | Status: DISCONTINUED | OUTPATIENT
Start: 2024-02-17 | End: 2024-02-17

## 2024-02-16 RX ORDER — CHLORHEXIDINE GLUCONATE 213 G/1000ML
1 SOLUTION TOPICAL
Refills: 0 | Status: DISCONTINUED | OUTPATIENT
Start: 2024-02-16 | End: 2024-02-17

## 2024-02-16 RX ORDER — VANCOMYCIN HCL 1 G
1.25 VIAL (EA) INTRAVENOUS
Qty: 0 | Refills: 0 | DISCHARGE
Start: 2024-02-16

## 2024-02-16 RX ORDER — DAPTOMYCIN 500 MG/10ML
600 INJECTION, POWDER, LYOPHILIZED, FOR SOLUTION INTRAVENOUS EVERY 24 HOURS
Refills: 0 | Status: DISCONTINUED | OUTPATIENT
Start: 2024-02-17 | End: 2024-02-17

## 2024-02-16 RX ORDER — CEFEPIME 1 G/1
2000 INJECTION, POWDER, FOR SOLUTION INTRAMUSCULAR; INTRAVENOUS EVERY 8 HOURS
Refills: 0 | Status: COMPLETED | OUTPATIENT
Start: 2024-02-16 | End: 2024-02-16

## 2024-02-16 RX ORDER — ERTAPENEM SODIUM 1 G/1
1 INJECTION, POWDER, LYOPHILIZED, FOR SOLUTION INTRAMUSCULAR; INTRAVENOUS
Qty: 1 | Refills: 0
Start: 2024-02-16 | End: 2024-02-25

## 2024-02-16 RX ORDER — OXYCODONE HYDROCHLORIDE 5 MG/1
1 TABLET ORAL
Qty: 28 | Refills: 0
Start: 2024-02-16 | End: 2024-02-22

## 2024-02-16 RX ADMIN — Medication 166.67 MILLIGRAM(S): at 17:33

## 2024-02-16 RX ADMIN — PANTOPRAZOLE SODIUM 40 MILLIGRAM(S): 20 TABLET, DELAYED RELEASE ORAL at 06:55

## 2024-02-16 RX ADMIN — ESCITALOPRAM OXALATE 20 MILLIGRAM(S): 10 TABLET, FILM COATED ORAL at 22:58

## 2024-02-16 RX ADMIN — CEFEPIME 100 MILLIGRAM(S): 1 INJECTION, POWDER, FOR SOLUTION INTRAMUSCULAR; INTRAVENOUS at 05:59

## 2024-02-16 RX ADMIN — TAMSULOSIN HYDROCHLORIDE 0.4 MILLIGRAM(S): 0.4 CAPSULE ORAL at 22:58

## 2024-02-16 RX ADMIN — Medication 15 MILLIGRAM(S): at 23:06

## 2024-02-16 RX ADMIN — CEFEPIME 100 MILLIGRAM(S): 1 INJECTION, POWDER, FOR SOLUTION INTRAMUSCULAR; INTRAVENOUS at 14:19

## 2024-02-16 RX ADMIN — CEFEPIME 100 MILLIGRAM(S): 1 INJECTION, POWDER, FOR SOLUTION INTRAMUSCULAR; INTRAVENOUS at 22:58

## 2024-02-16 RX ADMIN — Medication 15 MILLIGRAM(S): at 05:09

## 2024-02-16 RX ADMIN — FLUCONAZOLE 400 MILLIGRAM(S): 150 TABLET ORAL at 11:18

## 2024-02-16 RX ADMIN — PRIMIDONE 150 MILLIGRAM(S): 250 TABLET ORAL at 22:57

## 2024-02-16 RX ADMIN — Medication 166.67 MILLIGRAM(S): at 06:56

## 2024-02-16 RX ADMIN — Medication 1 PATCH: at 17:48

## 2024-02-16 RX ADMIN — Medication 1 PATCH: at 08:06

## 2024-02-16 RX ADMIN — Medication 1 PATCH: at 11:18

## 2024-02-16 NOTE — PROGRESS NOTE ADULT - NS ATTEND AMEND GEN_ALL_CORE FT
Patient doing well, R thumb ROM/swelling continues to improve. Please give first dose of daptomycin and ertapenem while inpatient tomorrow, and then can be discharged to continue antibiotics at home as above (daptomycin, ertapenem and PO fluconazole). Note, the isolation fo C.albicans was from a superficial wound culture at [facility], likely contaminant in this setting as patient is not immunosuppressed and does not have risk factors for candida SSTI. OR cultures here only with S.epi. However, will elect to treat broadly for now as above, for 2 week duration, pending clinical course.

## 2024-02-16 NOTE — DIETITIAN INITIAL EVALUATION ADULT - PERSON TAUGHT/METHOD
Pt amenable to education; RD provided education in regards to the importance of adequate macro and micronutrients, as well as hydration to support ADLs, maintain energy levels and overall functional/nutritional status. General healthful education provided. Nutrient dense foods promoted. RD emphasized the integral role that protein plays in expediting the wound healing process. Pt was receptive and verbalized understanding./verbal instruction/patient instructed

## 2024-02-16 NOTE — PROGRESS NOTE ADULT - SUBJECTIVE AND OBJECTIVE BOX
POST OPERATIVE DAY #: 4  STATUS POST:    Right thumb I&D                 SUBJECTIVE: Patient seen and examined. Pt. is very eager to go home, discussed plans to get picc line today and set up home infusion. Denies any sob/cp/n/v/numbness or tingling in b/l les.     OBJECTIVE:     Vital Signs Last 24 Hrs  T(C): 36.7 (16 Feb 2024 13:25), Max: 37.1 (16 Feb 2024 09:01)  T(F): 98.1 (16 Feb 2024 13:25), Max: 98.7 (16 Feb 2024 09:01)  HR: 66 (16 Feb 2024 13:25) (56 - 73)  BP: 132/83 (16 Feb 2024 13:25) (132/83 - 157/86)  BP(mean): 110 (16 Feb 2024 05:01) (100 - 110)  RR: 17 (16 Feb 2024 13:25) (16 - 17)  SpO2: 98% (16 Feb 2024 13:25) (96% - 99%)    Parameters below as of 16 Feb 2024 13:25  Patient On (Oxygen Delivery Method): room air        General: NAD   Affected extremity: RIGHT UE   Dressing: clean/dry/intact   Sensation: intact to light touch to patient's baseline  Motor exam: EPL firing,  5/5, in rodriguez block elevated  Pulses 2+             I&O's Detail    15 Feb 2024 07:01  -  16 Feb 2024 07:00  --------------------------------------------------------  IN:    IV PiggyBack: 250 mL    IV PiggyBack: 100 mL    Oral Fluid: 1080 mL  Total IN: 1430 mL    OUT:    Voided (mL): 1300 mL  Total OUT: 1300 mL    Total NET: 130 mL      16 Feb 2024 07:01  -  16 Feb 2024 14:10  --------------------------------------------------------  IN:    IV PiggyBack: 50 mL  Total IN: 50 mL    OUT:    Voided (mL): 200 mL  Total OUT: 200 mL    Total NET: -150 mL          LABS:                        13.5   4.82  )-----------( 253      ( 16 Feb 2024 06:08 )             41.0     02-16    139  |  104  |  18  ----------------------------<  102<H>  4.2   |  25  |  0.99    Ca    9.2      16 Feb 2024 06:08    TPro  6.7  /  Alb  3.7  /  TBili  0.2  /  DBili  x   /  AST  17  /  ALT  10  /  AlkPhos  79  02-16      Urinalysis Basic - ( 16 Feb 2024 06:08 )    Color: x / Appearance: x / SG: x / pH: x  Gluc: 102 mg/dL / Ketone: x  / Bili: x / Urobili: x   Blood: x / Protein: x / Nitrite: x   Leuk Esterase: x / RBC: x / WBC x   Sq Epi: x / Non Sq Epi: x / Bacteria: x        MEDICATIONS:  cefepime   IVPB 2000 milliGRAM(s) IV Intermittent every 8 hours  fluconAZOLE   Tablet 400 milliGRAM(s) Oral daily  vancomycin  IVPB 1250 milliGRAM(s) IV Intermittent once    acetaminophen     Tablet .. 1000 milliGRAM(s) Oral every 8 hours PRN  escitalopram 20 milliGRAM(s) Oral at bedtime  HYDROmorphone  Injectable 0.5 milliGRAM(s) IV Push every 3 hours PRN  ketorolac   Injectable 15 milliGRAM(s) IV Push every 6 hours  oxyCODONE    IR 5 milliGRAM(s) Oral every 4 hours PRN  primidone 150 milliGRAM(s) Oral at bedtime  prochlorperazine   Injectable 5 milliGRAM(s) IV Push once PRN          ASSESSMENT AND PLAN: 62yo Male s/p RIGHT THUMB I&D    1. Analgesic pain control  2. DVT prophylaxis:    SCDs        3. Weight Bearing Status:  NWB RIGHT UE   4. Disposition: Home tomorrow  5. Appreciate ID recs- Fluconazole 400mg daily po, dapto 60mg IV q 24 and ertapenem 1g q 24 till 2/26. Rx given, midline placed today. Will receive first dose tomorrow and home infusion starting on sunday. D/w

## 2024-02-16 NOTE — DIETITIAN INITIAL EVALUATION ADULT - OTHER CALCULATIONS
Based on Standards of Care pt within % ideal body weight, thus actual body weight used for all calculations. Needs adjusted for age, infection, postoperative demands.

## 2024-02-16 NOTE — PHARMACY EDUCATION NOTE - EDUCATION SUMMARY
Patient counseled on side effects associated with Ertapenem, Daptomycin, and Fluconazole administration. Patient was alerted to call physician if symptoms of original infection do not get better/become worse. At time of counseling, all questions pertaining to these medications were answered.

## 2024-02-16 NOTE — DIETITIAN INITIAL EVALUATION ADULT - PERTINENT LABORATORY DATA
02-16    139  |  104  |  18  ----------------------------<  102<H>  4.2   |  25  |  0.99    Ca    9.2      16 Feb 2024 06:08    TPro  6.7  /  Alb  3.7  /  TBili  0.2  /  DBili  x   /  AST  17  /  ALT  10  /  AlkPhos  79  02-16

## 2024-02-16 NOTE — PROGRESS NOTE ADULT - SUBJECTIVE AND OBJECTIVE BOX
Subjective:  No acute events overnight.  Patient is doing well today without new complaints.  Denies HA, CP, SOB, abdominal pain, nausea, vomiting, fever, chills or diarrhea.     Objective:   Vital Signs Last 24 Hrs  T(C): 36.7 (16 Feb 2024 13:25), Max: 37.1 (16 Feb 2024 09:01)  T(F): 98.1 (16 Feb 2024 13:25), Max: 98.7 (16 Feb 2024 09:01)  HR: 66 (16 Feb 2024 13:25) (62 - 73)  BP: 132/83 (16 Feb 2024 13:25) (132/83 - 157/86)  BP(mean): 110 (16 Feb 2024 05:01) (100 - 110)  RR: 17 (16 Feb 2024 13:25) (16 - 17)  SpO2: 98% (16 Feb 2024 13:25) (96% - 98%)    Parameters below as of 16 Feb 2024 13:25  Patient On (Oxygen Delivery Method): room air    Physical Exam:   -Gen: NAD, resting in bed  -HEENT: EOMI, PERRL, no scleral icterus  -CV: normal S1 and S2  -Lungs: CTABL, normal respiratory effort on RA  -Ab: soft, NT, ND, normal BS  -Ext: no LE edema, right hand wrapped   -Neuro: A&O x 3, no focal deficits     Labs:                        13.5   4.82  )-----------( 253      ( 16 Feb 2024 06:08 )             41.0       02-16    139  |  104  |  18  ----------------------------<  102<H>  4.2   |  25  |  0.99    Ca    9.2      16 Feb 2024 06:08    TPro  6.7  /  Alb  3.7  /  TBili  0.2  /  DBili  x   /  AST  17  /  ALT  10  /  AlkPhos  79  02-16    Microbiology:     Culture - Blood (collected 02-14-24 @ 11:16)  Source: .Blood Blood-Peripheral  Preliminary Report (02-16-24 @ 12:00):    No growth at 2 days.    Culture - Blood (collected 02-14-24 @ 11:16)  Source: .Blood Blood-Peripheral  Preliminary Report (02-16-24 @ 12:00):    No growth at 2 days.     Medications:  MEDICATIONS  (STANDING):  cefepime   IVPB 2000 milliGRAM(s) IV Intermittent every 8 hours  chlorhexidine 2% Cloths 1 Application(s) Topical once  chlorhexidine 2% Cloths 1 Application(s) Topical <User Schedule>  escitalopram 20 milliGRAM(s) Oral at bedtime  fluconAZOLE   Tablet 400 milliGRAM(s) Oral daily  ketorolac   Injectable 15 milliGRAM(s) IV Push every 6 hours  nicotine - 21 mG/24Hr(s) Patch 1 Patch Transdermal daily  pantoprazole    Tablet 40 milliGRAM(s) Oral before breakfast  polyethylene glycol 3350 17 Gram(s) Oral at bedtime  povidone iodine 5% Nasal Swab 1 Application(s) Both Nostrils once  primidone 150 milliGRAM(s) Oral at bedtime  senna 2 Tablet(s) Oral at bedtime  tamsulosin 0.4 milliGRAM(s) Oral at bedtime  vancomycin  IVPB 1250 milliGRAM(s) IV Intermittent once    MEDICATIONS  (PRN):  acetaminophen     Tablet .. 1000 milliGRAM(s) Oral every 8 hours PRN Mild Pain (1 - 3)  HYDROmorphone  Injectable 0.5 milliGRAM(s) IV Push every 3 hours PRN breakthrough pain on the floor  oxyCODONE    IR 5 milliGRAM(s) Oral every 4 hours PRN Severe Pain (7 - 10)  prochlorperazine   Injectable 5 milliGRAM(s) IV Push once PRN Nausea and/or Vomiting  sodium chloride 0.9% lock flush 10 milliLiter(s) IV Push every 1 hour PRN Pre/post blood products, medications, blood draw, and to maintain line patency

## 2024-02-16 NOTE — PROGRESS NOTE ADULT - ASSESSMENT
63-year-old male with a PMHx of essential tremors, anxiety, HLD and recent trigger finger release (c/b surgical site infection, s/p I&D and IV Abx) who presented for washout.       #Surgical Site Infection     #Flexor Tenosynovitis    -further management as per ortho, s/p I&D with hematoma evacuation on 2/12     -ID consulted, tentative plan for 14-day course of Daptomycin, Ertapenem and Fluconazole, midline placed on 2/16  -outpatient Cx growing staph epi, enterobacter and candida albicans   -OR Cx (2/12): staph epi   -BCx (2/14): NGTD    -OT consulted, no skilled OT needs      #Essential Tremor     -continue with Primidone 150mg qhs       #Anxiety      -continue with Lexapro 20mg daily        #BPH     -continue with tamsulosin 0.4mg qhs        DVT PPx: SCDs    Dispo: home

## 2024-02-16 NOTE — PROGRESS NOTE ADULT - SUBJECTIVE AND OBJECTIVE BOX
INFECTIOUS DISEASES CONSULT FOLLOW-UP NOTE    INTERVAL HPI/OVERNIGHT EVENTS:    Patient seen and examined at bedside. JOHN PAUL. Afebrile.     ROS:   Constitutional, eyes, ENT, cardiovascular, respiratory, gastrointestinal, genitourinary, integumentary, neurological, psychiatric and heme/lymph are otherwise negative other than noted above       ANTIBIOTICS/RELEVANT:    MEDICATIONS  (STANDING):  cefepime   IVPB 2000 milliGRAM(s) IV Intermittent every 8 hours  chlorhexidine 2% Cloths 1 Application(s) Topical once  chlorhexidine 2% Cloths 1 Application(s) Topical <User Schedule>  escitalopram 20 milliGRAM(s) Oral at bedtime  fluconAZOLE   Tablet 400 milliGRAM(s) Oral daily  ketorolac   Injectable 15 milliGRAM(s) IV Push every 6 hours  nicotine - 21 mG/24Hr(s) Patch 1 Patch Transdermal daily  pantoprazole    Tablet 40 milliGRAM(s) Oral before breakfast  polyethylene glycol 3350 17 Gram(s) Oral at bedtime  povidone iodine 5% Nasal Swab 1 Application(s) Both Nostrils once  primidone 150 milliGRAM(s) Oral at bedtime  senna 2 Tablet(s) Oral at bedtime  tamsulosin 0.4 milliGRAM(s) Oral at bedtime  vancomycin  IVPB 1250 milliGRAM(s) IV Intermittent once    MEDICATIONS  (PRN):  acetaminophen     Tablet .. 1000 milliGRAM(s) Oral every 8 hours PRN Mild Pain (1 - 3)  HYDROmorphone  Injectable 0.5 milliGRAM(s) IV Push every 3 hours PRN breakthrough pain on the floor  oxyCODONE    IR 5 milliGRAM(s) Oral every 4 hours PRN Severe Pain (7 - 10)  prochlorperazine   Injectable 5 milliGRAM(s) IV Push once PRN Nausea and/or Vomiting  sodium chloride 0.9% lock flush 10 milliLiter(s) IV Push every 1 hour PRN Pre/post blood products, medications, blood draw, and to maintain line patency        Vital Signs Last 24 Hrs  T(C): 36.7 (16 Feb 2024 13:25), Max: 37.1 (16 Feb 2024 09:01)  T(F): 98.1 (16 Feb 2024 13:25), Max: 98.7 (16 Feb 2024 09:01)  HR: 66 (16 Feb 2024 13:25) (62 - 73)  BP: 132/83 (16 Feb 2024 13:25) (132/83 - 157/86)  BP(mean): 110 (16 Feb 2024 05:01) (100 - 110)  RR: 17 (16 Feb 2024 13:25) (16 - 17)  SpO2: 98% (16 Feb 2024 13:25) (96% - 98%)    Parameters below as of 16 Feb 2024 13:25  Patient On (Oxygen Delivery Method): room air        02-15-24 @ 07:01  -  02-16-24 @ 07:00  --------------------------------------------------------  IN: 1430 mL / OUT: 1300 mL / NET: 130 mL    02-16-24 @ 07:01  -  02-16-24 @ 16:02  --------------------------------------------------------  IN: 50 mL / OUT: 200 mL / NET: -150 mL      PHYSICAL EXAM:  Constitutional: alert, NAD  Eyes: the sclera and conjunctiva were normal.   ENT: the ears and nose were normal in appearance.   Neck: the appearance of the neck was normal and the neck was supple.   Pulmonary: no respiratory distress and lungs were clear to auscultation bilaterally.   Heart: heart rate was normal and rhythm regular, normal S1 and S2  Vascular:. there was no peripheral edema  Abdomen: normal bowel sounds, soft, non-tender  MSK: R hand dressing c/d/i. Improving R thumb ROM        LABS:                        13.5   4.82  )-----------( 253      ( 16 Feb 2024 06:08 )             41.0     02-16    139  |  104  |  18  ----------------------------<  102<H>  4.2   |  25  |  0.99    Ca    9.2      16 Feb 2024 06:08    TPro  6.7  /  Alb  3.7  /  TBili  0.2  /  DBili  x   /  AST  17  /  ALT  10  /  AlkPhos  79  02-16      Urinalysis Basic - ( 16 Feb 2024 06:08 )    Color: x / Appearance: x / SG: x / pH: x  Gluc: 102 mg/dL / Ketone: x  / Bili: x / Urobili: x   Blood: x / Protein: x / Nitrite: x   Leuk Esterase: x / RBC: x / WBC x   Sq Epi: x / Non Sq Epi: x / Bacteria: x        MICROBIOLOGY:    Culture - Blood (collected 02-14-24 @ 11:16)  Source: .Blood Blood-Peripheral  Preliminary Report (02-16-24 @ 12:00):    No growth at 2 days.    Culture - Blood (collected 02-14-24 @ 11:16)  Source: .Blood Blood-Peripheral  Preliminary Report (02-16-24 @ 12:00):    No growth at 2 days.    Culture - Acid Fast - Tissue w/Smear (collected 02-12-24 @ 22:19)  Source: .Tissue subcutaneous tissue right thumb or spec 2  Preliminary Report (02-14-24 @ 15:09):    Culture is being performed.    Culture - Fungal, Tissue (collected 02-12-24 @ 22:19)  Source: .Tissue subcutaneous tissue right thumb or spec 2  Preliminary Report (02-14-24 @ 15:03):    Culture is being performed. Fungal cultures are held for 4 weeks.    Culture - Acid Fast - Other w/Smear (collected 02-12-24 @ 22:19)  Source: .Other carpal tunnel or spec  Preliminary Report (02-14-24 @ 15:09):    Culture is being performed.    Culture - Fungal, Other (collected 02-12-24 @ 22:19)  Source: .Other carpal tunnel or spec  Preliminary Report (02-14-24 @ 15:04):    Culture is being performed. Fungal cultures are held for 4 weeks.    Culture - Acid Fast - Tissue w/Smear (collected 02-12-24 @ 22:19)  Source: .Tissue parona space abcess rt or spec  Preliminary Report (02-14-24 @ 15:09):    Culture is being performed.    Culture - Fungal, Tissue (collected 02-12-24 @ 22:19)  Source: .Tissue parona space abcess rt or spec  Preliminary Report (02-14-24 @ 15:04):    Culture is being performed. Fungal cultures are held for 4 weeks.    Culture - Acid Fast - Tissue w/Smear (collected 02-12-24 @ 22:19)  Source: .Tissue subcutaneous tissue right thumb or spec  Preliminary Report (02-14-24 @ 15:09):    Culture is being performed.    Culture - Fungal, Tissue (collected 02-12-24 @ 22:19)  Source: .Tissue subcutaneous tissue right thumb or spec  Preliminary Report (02-14-24 @ 15:03):    Culture is being performed. Fungal cultures are held for 4 weeks.    Culture - Tissue with Gram Stain (collected 02-12-24 @ 22:19)  Source: .Tissue subcutaneous tissue right thumb or spec 2  Gram Stain (02-14-24 @ 12:15):    No organisms seen    Few WBC's  Final Report (02-15-24 @ 09:20):    Rare Staphylococcus epidermidis  Organism: Staphylococcus epidermidis  Staphylococcus epidermidis (02-15-24 @ 09:20)  Organism: Staphylococcus epidermidis (02-15-24 @ 09:20)      Method Type: ROSE  Organism: Staphylococcus epidermidis (02-15-24 @ 09:20)      -  Clindamycin: R >4      -  Oxacillin: R >2      -  Linezolid: S 1      -  Vancomycin: S 2      Method Type: ROSE      -  Rifampin: S <=1 Should not be used as monotherapy      -  Erythromycin: R >4      -  Trimethoprim/Sulfamethoxazole: R >2/38    Culture - Surgical Swab (collected 02-12-24 @ 22:19)  Source: .Surgical Swab carpal tunnel or spec  Gram Stain (02-14-24 @ 12:13):    No organisms seen    Rare WBC's  Final Report (02-15-24 @ 09:19):    Rare Staphylococcus epidermidis  Organism: Staphylococcus epidermidis (02-15-24 @ 09:19)  Organism: Staphylococcus epidermidis (02-15-24 @ 09:19)      -  Clindamycin: R >4      -  Oxacillin: R >2      -  Linezolid: S 1      -  Vancomycin: S 2      Method Type: ROSE      -  Rifampin: S <=1 Should not be used as monotherapy      -  Erythromycin: R >4      -  Trimethoprim/Sulfamethoxazole: R >2/38    Culture - Tissue with Gram Stain (collected 02-12-24 @ 22:19)  Source: .Tissue parona space abcess rt or spec  Gram Stain (02-14-24 @ 12:17):    No organisms seen    Few WBC's  Final Report (02-15-24 @ 09:21):    Rare Staphylococcus epidermidis  Organism: Staphylococcus epidermidis (02-15-24 @ 09:21)  Organism: Staphylococcus epidermidis (02-15-24 @ 09:21)      -  Clindamycin: R >4      -  Oxacillin: R >2      -  Linezolid: S 1      -  Vancomycin: S 2      Method Type: ROSE      -  Rifampin: S <=1 Should not be used as monotherapy      -  Erythromycin: R >4      -  Trimethoprim/Sulfamethoxazole: R >2/38    Culture - Tissue with Gram Stain (collected 02-12-24 @ 22:19)  Source: .Tissue subcutaneous tissue right thumb or spec 1  Gram Stain (02-13-24 @ 00:24):    Rare Gram positive cocci in pairs    Moderate WBC's  Final Report (02-14-24 @ 10:55):    Few Staphylococcus epidermidis  Organism: Staphylococcus epidermidis  Staphylococcus epidermidis (02-14-24 @ 10:55)  Organism: Staphylococcus epidermidis (02-14-24 @ 10:55)      Method Type: ROSE  Organism: Staphylococcus epidermidis (02-14-24 @ 10:55)      -  Clindamycin: R >4      -  Oxacillin: R >2      -  Linezolid: S <=0.5      -  Vancomycin: S 2      Method Type: ROSE      -  Rifampin: S <=1 Should not be used as monotherapy      -  Erythromycin: R >4      -  Trimethoprim/Sulfamethoxazole: R >2/38        RADIOLOGY & ADDITIONAL STUDIES:  Reviewed

## 2024-02-16 NOTE — PROGRESS NOTE ADULT - ASSESSMENT
63M w/ HLD, essential tremor, anxiety, s/p L THR, and recent R trigger thumb release 1/2/24 which was complicated by SSI, with recent wound cx from [facility] 1/28 with ECC, S.epi, C.albicans, but was not improving with PO abx and so presented to ED on 2/12, s/p I&D 2/12 with finding of pus in flexor sheath, carpal tunnel and parona's space, cultures with S.epi. Treated with vancomycin, zosyn -> cefepime, and started on fluc 2/14 (QTc 394ms 2/14), with improving R thumb swelling and ROM. Will switch to dapto/erta from vanc/cefepime for ease of dosing at home/patient's job. Midline placed 2/16. To be discharged tomorrow     Recommendations  - stop vancomycin and cefepime   - start Daptomycin 600mg IV daily. check CK   - start ertapenem 1g IV daily   - Continue fluconazole 400mg PO daily  - Duration of antibiotics is 2 weeks from I&D ( 2/12 - 2/25 )  - Weekly labs: CMP, CBC w/ diff, CK faxed to ID office at [phone]  - Patient to follow up with Dr. Toro in 2 weeks ([address] [place] [address], [phone]), ID office will call patient to schedule     Team 2 will sign off. Thank you for your consultation   Please reconsult w/ ?    Case d/w primary team.  Final recommendation pending attending note.    Faina Randle, Infectious Diseases PA  Please reach out for any questions 9 am-5pm. For evenings and weekends, please call the ID physician on call.  Work cell: [phone]   63M w/ HLD, essential tremor, anxiety, s/p L THR, and recent R trigger thumb release 1/2/24 which was complicated by SSI, with recent wound cx from [facility] 1/28 with ECC, S.epi, C.albicans, but was not improving with PO abx and so presented to ED on 2/12, s/p I&D 2/12 with finding of pus in flexor sheath, carpal tunnel and parona's space, cultures with S.epi. Treated with vancomycin, zosyn -> cefepime, and started on fluc 2/14 (QTc 394ms 2/14), with improving R thumb swelling and ROM. Will switch to dapto/erta from vanc/cefepime for ease of dosing at home/patient's job. Midline placed 2/16. To be discharged tomorrow     Recommendations  - stop vancomycin and cefepime   - start Daptomycin 600mg IV daily. check CK   - start ertapenem 1g IV daily   - Continue fluconazole 400mg PO daily  - Duration of antibiotics is 2 weeks from I&D ( 2/12 - 2/25 )  - Weekly labs: CMP, CBC w/ diff, CK faxed to ID office at [phone]  - Patient to follow up with Dr. Toro in 2 weeks ([address] [place] [address], [phone]), ID office will call patient to schedule     Team 2 will sign off. Thank you for your consultation   Please reconsult w/ ?    Case d/w primary team.    Faina Randle, Infectious Diseases PA  Please reach out for any questions 9 am-5pm. For evenings and weekends, please call the ID physician on call.  Work cell: [phone]   0

## 2024-02-16 NOTE — DIETITIAN INITIAL EVALUATION ADULT - OTHER INFO
63y Male s/p right trigger thumb release on 1/2/24 c/b SSI RTOR for I&D on 2/12  PMHx: essential tremors, HLD, anxiety, BPH    Pt seen in room for nutrition assessment. Pt reports good appetite PTA and during hospital stay. As per diet recall PTA: pt endorsed he eats a variety of foods, used to be a , eats several meals per day. Currently on regular diet, tolerating well, noted with good, ~% PO intakes overall. No cultural, Mormon, or ethnic food preferences noted. No known food allergies. Denies wt changes, reports wt stability at current wt. Dosing wt: 189.5 pounds, Ideal body weight: 166 pounds, pt is 114% of ideal body weight. Denies nausea, vomiting, diarrhea, constipation, last BM on 2/15/24. No edema. Skin: right hand surgical incision, no pressure ulcers noted. Ian: 22. No issues chewing or swallowing noted. Denies pain. Labs reviewed: elevated serum Glucose (102); RD to continue to monitor trends. Nutritionally pertinent medications/supplements: IV antibiotics, protonix, senna, MIRALAX. RD observed pt with no overt signs of muscle or fat wasting. Based on ASPEN guidelines, pt does not meet criteria for malnutrition at this time. Pt amenable to education; RD provided education in regards to the importance of adequate macro and micronutrients, as well as hydration to support ADLs, maintain energy levels and overall functional/nutritional status. General healthful education provided. Nutrient dense foods promoted. RD emphasized the integral role that protein plays in expediting the wound healing process. Pt was receptive and verbalized understanding. No additional nutrition-related concerns. Will continue to follow. Additional nutrition recommendations below to follow.

## 2024-02-16 NOTE — DIETITIAN INITIAL EVALUATION ADULT - PERTINENT MEDS FT
MEDICATIONS  (STANDING):  cefepime   IVPB 2000 milliGRAM(s) IV Intermittent every 8 hours  chlorhexidine 2% Cloths 1 Application(s) Topical <User Schedule>  chlorhexidine 2% Cloths 1 Application(s) Topical once  escitalopram 20 milliGRAM(s) Oral at bedtime  fluconAZOLE   Tablet 400 milliGRAM(s) Oral daily  ketorolac   Injectable 15 milliGRAM(s) IV Push every 6 hours  nicotine - 21 mG/24Hr(s) Patch 1 Patch Transdermal daily  pantoprazole    Tablet 40 milliGRAM(s) Oral before breakfast  polyethylene glycol 3350 17 Gram(s) Oral at bedtime  povidone iodine 5% Nasal Swab 1 Application(s) Both Nostrils once  primidone 150 milliGRAM(s) Oral at bedtime  senna 2 Tablet(s) Oral at bedtime  tamsulosin 0.4 milliGRAM(s) Oral at bedtime  vancomycin  IVPB 1250 milliGRAM(s) IV Intermittent once    MEDICATIONS  (PRN):  acetaminophen     Tablet .. 1000 milliGRAM(s) Oral every 8 hours PRN Mild Pain (1 - 3)  HYDROmorphone  Injectable 0.5 milliGRAM(s) IV Push every 3 hours PRN breakthrough pain on the floor  oxyCODONE    IR 5 milliGRAM(s) Oral every 4 hours PRN Severe Pain (7 - 10)  prochlorperazine   Injectable 5 milliGRAM(s) IV Push once PRN Nausea and/or Vomiting  sodium chloride 0.9% lock flush 10 milliLiter(s) IV Push every 1 hour PRN Pre/post blood products, medications, blood draw, and to maintain line patency

## 2024-02-16 NOTE — PROGRESS NOTE ADULT - SUBJECTIVE AND OBJECTIVE BOX
Ortho Note    Surgery: 63y Male s/p right trigger thumb release on 1/2/24 c/b SSI RTOR for I&D on 2/12    patient pain well controlled today. R hand dsg changed today.   Pt states pain significantly improved and ROM improved   Denies CP, SOB, N/V, numbness/tingling     Vital Signs Last 24 Hrs  T(C): 37 (02-15-24 @ 14:31), Max: 37 (02-15-24 @ 14:31)  T(F): 98.6 (02-15-24 @ 14:31), Max: 98.6 (02-15-24 @ 14:31)  HR: 56 (02-15-24 @ 14:31) (56 - 56)  BP: 150/83 (02-15-24 @ 14:31) (150/83 - 150/83)  BP(mean): --  RR: 17 (02-15-24 @ 14:31) (17 - 17)  SpO2: 99% (02-15-24 @ 14:31) (99% - 99%)  AVSS    General: Pt Alert and oriented, NAD  Dressing C/D/I Right upper extremity- changed this AM by Dr. Russo   extremity warm and well perfused   Full AROM of digits and wrist right hand without pain         A/P: 63yMale POD#4 s/p RTOR for I&D on 2/12; index procedure right trigger thumb release on 1/2/24 c/b SSI     - Labs and vitals stable, blood Cx no growth to date, OR cx growing staph epi   - Per outpatient cultures reviewed by ID, initial cultures at [facility] MD growing staph epi/enterobacter/candida albicans  - c/w vancomycin, cefepime, PO fluconazole IP, plan to switch to daptomycin, ertapenen upon dc  - Patient for midline placement once blood cx negative x48 hours, 2/16 by 12pm   - Pain Control- patient reports pain improved   - DVT ppx: SCDs  - PT, WBS: NWB RUE, elevation in rodriguez block pillow  - Dispo: dc home with home infusion pending Midline placement and final ID recs     Ortho Pager [phone]

## 2024-02-16 NOTE — DIETITIAN INITIAL EVALUATION ADULT - ADD RECOMMEND
1. Continue with current diet order (regular)  2. Encourage pt to meet nutritional needs as able   3. Monitor PO intakes, trend weights (biweekly), monitor skin integrity, monitor labs (electrolytes, CMP), monitor GI function  4. Encourage adherence to diet education (reinforce as able)  5. Pain and bowel regimen per team   6. Will continue to assess/honor preferences as able   7. Align nutrition interventions with goals of care at all times

## 2024-02-17 ENCOUNTER — TRANSCRIPTION ENCOUNTER (OUTPATIENT)
Age: 64
End: 2024-02-17

## 2024-02-17 VITALS
DIASTOLIC BLOOD PRESSURE: 58 MMHG | TEMPERATURE: 98 F | OXYGEN SATURATION: 97 % | RESPIRATION RATE: 17 BRPM | SYSTOLIC BLOOD PRESSURE: 108 MMHG | HEART RATE: 71 BPM

## 2024-02-17 PROCEDURE — 80053 COMPREHEN METABOLIC PANEL: CPT

## 2024-02-17 PROCEDURE — 85610 PROTHROMBIN TIME: CPT

## 2024-02-17 PROCEDURE — 86900 BLOOD TYPING SEROLOGIC ABO: CPT

## 2024-02-17 PROCEDURE — 87015 SPECIMEN INFECT AGNT CONCNTJ: CPT

## 2024-02-17 PROCEDURE — 76937 US GUIDE VASCULAR ACCESS: CPT

## 2024-02-17 PROCEDURE — 36415 COLL VENOUS BLD VENIPUNCTURE: CPT

## 2024-02-17 PROCEDURE — 85027 COMPLETE CBC AUTOMATED: CPT

## 2024-02-17 PROCEDURE — 87070 CULTURE OTHR SPECIMN AEROBIC: CPT

## 2024-02-17 PROCEDURE — 85652 RBC SED RATE AUTOMATED: CPT

## 2024-02-17 PROCEDURE — 87040 BLOOD CULTURE FOR BACTERIA: CPT

## 2024-02-17 PROCEDURE — 85025 COMPLETE CBC W/AUTO DIFF WBC: CPT

## 2024-02-17 PROCEDURE — 87116 MYCOBACTERIA CULTURE: CPT

## 2024-02-17 PROCEDURE — 99285 EMERGENCY DEPT VISIT HI MDM: CPT | Mod: 25

## 2024-02-17 PROCEDURE — 86140 C-REACTIVE PROTEIN: CPT

## 2024-02-17 PROCEDURE — 82550 ASSAY OF CK (CPK): CPT

## 2024-02-17 PROCEDURE — 87102 FUNGUS ISOLATION CULTURE: CPT

## 2024-02-17 PROCEDURE — 87186 SC STD MICRODIL/AGAR DIL: CPT

## 2024-02-17 PROCEDURE — 87206 SMEAR FLUORESCENT/ACID STAI: CPT

## 2024-02-17 PROCEDURE — 80076 HEPATIC FUNCTION PANEL: CPT

## 2024-02-17 PROCEDURE — 99232 SBSQ HOSP IP/OBS MODERATE 35: CPT

## 2024-02-17 PROCEDURE — 85730 THROMBOPLASTIN TIME PARTIAL: CPT

## 2024-02-17 PROCEDURE — 87075 CULTR BACTERIA EXCEPT BLOOD: CPT

## 2024-02-17 PROCEDURE — 86850 RBC ANTIBODY SCREEN: CPT

## 2024-02-17 PROCEDURE — 80048 BASIC METABOLIC PNL TOTAL CA: CPT

## 2024-02-17 PROCEDURE — 73130 X-RAY EXAM OF HAND: CPT

## 2024-02-17 PROCEDURE — 36410 VNPNXR 3YR/> PHY/QHP DX/THER: CPT

## 2024-02-17 PROCEDURE — 80202 ASSAY OF VANCOMYCIN: CPT

## 2024-02-17 PROCEDURE — 97165 OT EVAL LOW COMPLEX 30 MIN: CPT

## 2024-02-17 PROCEDURE — 86901 BLOOD TYPING SEROLOGIC RH(D): CPT

## 2024-02-17 RX ADMIN — ERTAPENEM SODIUM 120 MILLIGRAM(S): 1 INJECTION, POWDER, LYOPHILIZED, FOR SOLUTION INTRAMUSCULAR; INTRAVENOUS at 07:06

## 2024-02-17 RX ADMIN — CHLORHEXIDINE GLUCONATE 1 APPLICATION(S): 213 SOLUTION TOPICAL at 07:06

## 2024-02-17 RX ADMIN — Medication 1 PATCH: at 07:46

## 2024-02-17 RX ADMIN — DAPTOMYCIN 124 MILLIGRAM(S): 500 INJECTION, POWDER, LYOPHILIZED, FOR SOLUTION INTRAVENOUS at 06:10

## 2024-02-17 RX ADMIN — PANTOPRAZOLE SODIUM 40 MILLIGRAM(S): 20 TABLET, DELAYED RELEASE ORAL at 06:12

## 2024-02-17 NOTE — DISCHARGE NOTE NURSING/CASE MANAGEMENT/SOCIAL WORK - PATIENT PORTAL LINK FT
You can access the FollowMyHealth Patient Portal offered by [facility] by registering at the following website: [email or web address]. By joining [facility]’s FollowMyHealth portal, you will also be able to view your health information using other applications (apps) compatible with our system.

## 2024-02-17 NOTE — PROGRESS NOTE ADULT - SUBJECTIVE AND OBJECTIVE BOX
INTERVAL HPI/OVERNIGHT EVENTS: raffi o/n    SUBJECTIVE: Patient seen and examined at bedside.   Pt eager to return home. Pain controlled in hand. No numbness, fever, chills. Denies diarrhea. No chest pain, dyspnea, N/V/Abd pain.    OBJECTIVE:    VITAL SIGNS:  ICU Vital Signs Last 24 Hrs  T(C): 36.9 (17 Feb 2024 08:35), Max: 37 (16 Feb 2024 21:10)  T(F): 98.4 (17 Feb 2024 08:35), Max: 98.6 (16 Feb 2024 21:10)  HR: 71 (17 Feb 2024 08:35) (63 - 72)  BP: 108/58 (17 Feb 2024 08:35) (108/58 - 153/83)  BP(mean): --  ABP: --  ABP(mean): --  RR: 17 (17 Feb 2024 08:35) (16 - 17)  SpO2: 97% (17 Feb 2024 08:35) (96% - 98%)    O2 Parameters below as of 17 Feb 2024 08:35  Patient On (Oxygen Delivery Method): room air              02-16 @ 07:01  -  02-17 @ 07:00  --------------------------------------------------------  IN: 300 mL / OUT: 1250 mL / NET: -950 mL      CAPILLARY BLOOD GLUCOSE          PHYSICAL EXAM:  GEN: Male in NAD on RA  HEENT: NC/AT, MMM  CV: RRR, nml S1S2, no murmurs  PULM: nml effort, CTAB  ABD: Soft, non-distended, NABS, non-tender  NEURO  A/O x3, moving all extremities, Sensation intact  R hand wrapped in kerlix. moving all fingers.   L arm midline in place w dressing c/d/i  PSYCH: Appropriate      MEDICATIONS:  MEDICATIONS  (STANDING):  chlorhexidine 2% Cloths 1 Application(s) Topical once  chlorhexidine 2% Cloths 1 Application(s) Topical <User Schedule>  DAPTOmycin IVPB 600 milliGRAM(s) IV Intermittent every 24 hours  ertapenem  IVPB 1000 milliGRAM(s) IV Intermittent every 24 hours  escitalopram 20 milliGRAM(s) Oral at bedtime  fluconAZOLE   Tablet 400 milliGRAM(s) Oral daily  ketorolac   Injectable 15 milliGRAM(s) IV Push every 6 hours  nicotine - 21 mG/24Hr(s) Patch 1 Patch Transdermal daily  pantoprazole    Tablet 40 milliGRAM(s) Oral before breakfast  polyethylene glycol 3350 17 Gram(s) Oral at bedtime  povidone iodine 5% Nasal Swab 1 Application(s) Both Nostrils once  primidone 150 milliGRAM(s) Oral at bedtime  senna 2 Tablet(s) Oral at bedtime  tamsulosin 0.4 milliGRAM(s) Oral at bedtime    MEDICATIONS  (PRN):  acetaminophen     Tablet .. 1000 milliGRAM(s) Oral every 8 hours PRN Mild Pain (1 - 3)  HYDROmorphone  Injectable 0.5 milliGRAM(s) IV Push every 3 hours PRN breakthrough pain on the floor  oxyCODONE    IR 5 milliGRAM(s) Oral every 4 hours PRN Severe Pain (7 - 10)  prochlorperazine   Injectable 5 milliGRAM(s) IV Push once PRN Nausea and/or Vomiting  sodium chloride 0.9% lock flush 10 milliLiter(s) IV Push every 1 hour PRN Pre/post blood products, medications, blood draw, and to maintain line patency      ALLERGIES:  Allergies    No Known Allergies    Intolerances        LABS:                        13.5   4.82  )-----------( 253      ( 16 Feb 2024 06:08 )             41.0     02-16    139  |  104  |  18  ----------------------------<  102<H>  4.2   |  25  |  0.99    Ca    9.2      16 Feb 2024 06:08    TPro  6.7  /  Alb  3.7  /  TBili  0.2  /  DBili  x   /  AST  17  /  ALT  10  /  AlkPhos  79  02-16      Urinalysis Basic - ( 16 Feb 2024 06:08 )    Color: x / Appearance: x / SG: x / pH: x  Gluc: 102 mg/dL / Ketone: x  / Bili: x / Urobili: x   Blood: x / Protein: x / Nitrite: x   Leuk Esterase: x / RBC: x / WBC x   Sq Epi: x / Non Sq Epi: x / Bacteria: x        RADIOLOGY & ADDITIONAL TESTS: Reviewed.

## 2024-02-17 NOTE — PROGRESS NOTE ADULT - PROVIDER SPECIALTY LIST ADULT
Orthopedics
Orthopedics
Hospitalist
Orthopedics
Hand Surgery
Hospitalist
Infectious Disease
Orthopedics
Infectious Disease
Infectious Disease

## 2024-02-17 NOTE — PROGRESS NOTE ADULT - ASSESSMENT
63-year-old male with a PMHx of essential tremors, anxiety, HLD and recent trigger finger release (c/b surgical site infection, s/p I&D and IV Abx) who presented for washout, s/p I&D w Dr. Russo 2/12 - for 2wk IV abx on daptomycin, ertapenem, and fluconazole - dc for home w home infusions    #Surgical Site Infection     #Flexor Tenosynovitis    -further management as per ortho, s/p I&D with hematoma evacuation on 2/12     -ID consulted, tentative plan for 14-day course of Daptomycin 600mg q24h, Ertapenem 1g q24 and Fluconazole 400mg daily, midline placed on 2/16  -outpatient Cx growing staph epi, enterobacter and candida albicans   -OR Cx (2/12): staph epi   -BCx (2/14): NGTD    -OT consulted, no skilled OT needs      #Essential Tremor     -continue with Primidone 150mg qhs       #Anxiety      -continue with Lexapro 20mg daily        #BPH     -continue with tamsulosin 0.4mg qhs        Plan  Overall doing well - optimized for DC from medical standpoint    DVT PPx: SCDs  Dispo: home w home infusion

## 2024-02-17 NOTE — DISCHARGE NOTE NURSING/CASE MANAGEMENT/SOCIAL WORK - NSDCPEFALRISK_GEN_ALL_CORE
17-Mar-2017 For information on Fall & Injury Prevention, visit: [email or web address] OR  [email or web address] OR  https://www.cdc.gov/steadi/patient.html

## 2024-02-17 NOTE — PROGRESS NOTE ADULT - REASON FOR ADMISSION
s/p right trigger thumb release c/b surgical site infection

## 2024-02-19 LAB
CULTURE RESULTS: SIGNIFICANT CHANGE UP
CULTURE RESULTS: SIGNIFICANT CHANGE UP
SPECIMEN SOURCE: SIGNIFICANT CHANGE UP
SPECIMEN SOURCE: SIGNIFICANT CHANGE UP

## 2024-02-23 DIAGNOSIS — T81.42XA INFECTION FOLLOWING A PROCEDURE, DEEP INCISIONAL SURGICAL SITE, INITIAL ENCOUNTER: ICD-10-CM

## 2024-02-23 DIAGNOSIS — M65.141 OTHER INFECTIVE (TENO)SYNOVITIS, RIGHT HAND: ICD-10-CM

## 2024-02-23 DIAGNOSIS — Z79.899 OTHER LONG TERM (CURRENT) DRUG THERAPY: ICD-10-CM

## 2024-02-23 DIAGNOSIS — Z79.82 LONG TERM (CURRENT) USE OF ASPIRIN: ICD-10-CM

## 2024-02-23 DIAGNOSIS — F41.9 ANXIETY DISORDER, UNSPECIFIED: ICD-10-CM

## 2024-02-23 DIAGNOSIS — N40.0 BENIGN PROSTATIC HYPERPLASIA WITHOUT LOWER URINARY TRACT SYMPTOMS: ICD-10-CM

## 2024-02-23 DIAGNOSIS — E78.5 HYPERLIPIDEMIA, UNSPECIFIED: ICD-10-CM

## 2024-02-23 DIAGNOSIS — G25.0 ESSENTIAL TREMOR: ICD-10-CM

## 2024-02-26 PROBLEM — G56.01 CARPAL TUNNEL SYNDROME OF RIGHT WRIST: Status: ACTIVE | Noted: 2021-06-10

## 2024-02-27 ENCOUNTER — APPOINTMENT (OUTPATIENT)
Dept: ORTHOPEDIC SURGERY | Facility: CLINIC | Age: 64
End: 2024-02-27
Payer: MEDICAID

## 2024-02-27 DIAGNOSIS — G56.01 CARPAL TUNNEL SYNDROME, RIGHT UPPER LIMB: ICD-10-CM

## 2024-02-27 PROCEDURE — 99024 POSTOP FOLLOW-UP VISIT: CPT

## 2024-02-27 NOTE — HISTORY OF PRESENT ILLNESS
[de-identified] : Date of Surgery: February 12, 2024 (2 weeks 1 day ago) [de-identified] : The patient's right palmar, hand, wrist and thumb and incision is clean and dry with no signs of infection. No erythema or purulent discharge present over the entire length of the incision.  Prolene sutures were removed and exchanged for Steri-strips.  There is near full symmetric digital range of motion mildly diminished at the thumb. APB and FDI are 5/5 strength on the right side. Sensation intact to light touch throughout the entire right hand.  FPL is intact. [de-identified] : My impression is that the patient is progressing gradually and appropriately status post radical tenosynovectomy, irrigation and debridement of right thumb FPL tendon sheath, carpal tunnel and Parona space (wrist) 15 days ago after an infection was noted. I discussed the next steps in treatment with the patient which include keeping the left hand clean and dry for the next week.  I emphasized the importance of this.  I recommended that he follow up with me in 2 weeks to monitor his progress.  I allowed him to begin getting the hand wet, under running water only, starting next week.  I given extra Steri-Strips to keep the incision covered while it is still healing.  All questions were answered and he was in accordance with the plan. [de-identified] : The patient is presenting 2 weeks 1 day status post radical tenosynovectomy, irrigation and debridement of right thumb FPL tendon sheath, carpal tunnel and Parona space (wrist). The patient states that he finished his course of antibiotics as directed. The patient denies any numbness or tingling of the right hand.

## 2024-03-05 ENCOUNTER — APPOINTMENT (OUTPATIENT)
Dept: ORTHOPEDIC SURGERY | Facility: CLINIC | Age: 64
End: 2024-03-05

## 2024-03-13 LAB
CULTURE RESULTS: SIGNIFICANT CHANGE UP
SPECIMEN SOURCE: SIGNIFICANT CHANGE UP

## 2024-03-15 ENCOUNTER — APPOINTMENT (OUTPATIENT)
Dept: ORTHOPEDIC SURGERY | Facility: CLINIC | Age: 64
End: 2024-03-15
Payer: MEDICAID

## 2024-03-15 DIAGNOSIS — M65.311 TRIGGER THUMB, RIGHT THUMB: ICD-10-CM

## 2024-03-15 DIAGNOSIS — M65.30 TRIGGER FINGER, UNSPECIFIED FINGER: ICD-10-CM

## 2024-03-15 PROCEDURE — 99024 POSTOP FOLLOW-UP VISIT: CPT

## 2024-03-22 ENCOUNTER — APPOINTMENT (OUTPATIENT)
Dept: INFECTIOUS DISEASE | Facility: CLINIC | Age: 64
End: 2024-03-22

## 2024-03-29 ENCOUNTER — APPOINTMENT (OUTPATIENT)
Dept: DERMATOLOGY | Facility: CLINIC | Age: 64
End: 2024-03-29

## 2024-03-30 LAB
CULTURE RESULTS: SIGNIFICANT CHANGE UP
SPECIMEN SOURCE: SIGNIFICANT CHANGE UP

## 2024-04-02 RX ORDER — PRIMIDONE 50 MG/1
50 TABLET ORAL
Qty: 120 | Refills: 1 | Status: ACTIVE | COMMUNITY
Start: 2019-05-17 | End: 1900-01-01

## 2024-04-03 ENCOUNTER — APPOINTMENT (OUTPATIENT)
Dept: ORTHOPEDIC SURGERY | Facility: CLINIC | Age: 64
End: 2024-04-03

## 2024-04-09 ENCOUNTER — RX RENEWAL (OUTPATIENT)
Age: 64
End: 2024-04-09

## 2024-06-03 ENCOUNTER — NON-APPOINTMENT (OUTPATIENT)
Age: 64
End: 2024-06-03

## 2024-09-04 ENCOUNTER — APPOINTMENT (OUTPATIENT)
Dept: ORTHOPEDIC SURGERY | Facility: CLINIC | Age: 64
End: 2024-09-04

## 2024-09-25 ENCOUNTER — APPOINTMENT (OUTPATIENT)
Dept: ORTHOPEDIC SURGERY | Facility: CLINIC | Age: 64
End: 2024-09-25

## 2024-11-02 ENCOUNTER — NON-APPOINTMENT (OUTPATIENT)
Age: 64
End: 2024-11-02

## 2024-11-05 ENCOUNTER — RESULT REVIEW (OUTPATIENT)
Age: 64
End: 2024-11-05

## 2024-11-05 ENCOUNTER — OUTPATIENT (OUTPATIENT)
Dept: OUTPATIENT SERVICES | Facility: HOSPITAL | Age: 64
LOS: 1 days | End: 2024-11-05
Payer: COMMERCIAL

## 2024-11-05 ENCOUNTER — APPOINTMENT (OUTPATIENT)
Dept: ORTHOPEDIC SURGERY | Facility: CLINIC | Age: 64
End: 2024-11-05
Payer: COMMERCIAL

## 2024-11-05 VITALS
OXYGEN SATURATION: 99 % | DIASTOLIC BLOOD PRESSURE: 70 MMHG | SYSTOLIC BLOOD PRESSURE: 133 MMHG | HEIGHT: 70 IN | BODY MASS INDEX: 27.92 KG/M2 | HEART RATE: 62 BPM | WEIGHT: 195 LBS

## 2024-11-05 DIAGNOSIS — Z96.641 AFTERCARE FOLLOWING JOINT REPLACEMENT SURGERY: ICD-10-CM

## 2024-11-05 DIAGNOSIS — Z98.890 OTHER SPECIFIED POSTPROCEDURAL STATES: Chronic | ICD-10-CM

## 2024-11-05 DIAGNOSIS — M16.12 UNILATERAL PRIMARY OSTEOARTHRITIS, LEFT HIP: ICD-10-CM

## 2024-11-05 DIAGNOSIS — Z47.1 AFTERCARE FOLLOWING JOINT REPLACEMENT SURGERY: ICD-10-CM

## 2024-11-05 DIAGNOSIS — Z96.641 PRESENCE OF RIGHT ARTIFICIAL HIP JOINT: Chronic | ICD-10-CM

## 2024-11-05 DIAGNOSIS — Z96.642 AFTERCARE FOLLOWING JOINT REPLACEMENT SURGERY: ICD-10-CM

## 2024-11-05 DIAGNOSIS — M76.11 PSOAS TENDINITIS, RIGHT HIP: ICD-10-CM

## 2024-11-05 PROCEDURE — 73521 X-RAY EXAM HIPS BI 2 VIEWS: CPT | Mod: 26

## 2024-11-05 PROCEDURE — 99214 OFFICE O/P EST MOD 30 MIN: CPT

## 2024-11-05 PROCEDURE — 73521 X-RAY EXAM HIPS BI 2 VIEWS: CPT

## 2024-11-05 RX ORDER — CELECOXIB 200 MG/1
200 CAPSULE ORAL TWICE DAILY
Qty: 60 | Refills: 2 | Status: ACTIVE | COMMUNITY
Start: 2024-11-05 | End: 1900-01-01

## 2024-11-06 PROBLEM — M76.11 PSOAS TENDINITIS, RIGHT HIP: Noted: 2020-02-24

## 2024-11-06 PROBLEM — M16.12 PRIMARY OSTEOARTHRITIS OF LEFT HIP: Noted: 2022-11-08

## 2024-11-15 ENCOUNTER — OUTPATIENT (OUTPATIENT)
Dept: OUTPATIENT SERVICES | Facility: HOSPITAL | Age: 64
LOS: 1 days | End: 2024-11-15

## 2024-11-15 ENCOUNTER — RESULT REVIEW (OUTPATIENT)
Age: 64
End: 2024-11-15

## 2024-11-15 ENCOUNTER — APPOINTMENT (OUTPATIENT)
Dept: INTERVENTIONAL RADIOLOGY/VASCULAR | Facility: HOSPITAL | Age: 64
End: 2024-11-15

## 2024-11-15 DIAGNOSIS — Z98.890 OTHER SPECIFIED POSTPROCEDURAL STATES: Chronic | ICD-10-CM

## 2024-11-15 DIAGNOSIS — Z96.641 PRESENCE OF RIGHT ARTIFICIAL HIP JOINT: Chronic | ICD-10-CM

## 2024-11-15 PROCEDURE — 20611 DRAIN/INJ JOINT/BURSA W/US: CPT | Mod: LT

## 2024-11-15 PROCEDURE — 20550 NJX 1 TENDON SHEATH/LIGAMENT: CPT | Mod: 59,LT

## 2024-11-15 PROCEDURE — 20550 NJX 1 TENDON SHEATH/LIGAMENT: CPT | Mod: 59

## 2024-11-15 PROCEDURE — 20611 DRAIN/INJ JOINT/BURSA W/US: CPT

## 2024-11-22 NOTE — PATIENT PROFILE ADULT - NSPROPTRIGHTCAREGIVER_GEN_A_NUR
His sx are improving and mostly resolved. We will hold off on ordering a second steroid course. He will let me know if his sx worsen. I encouraged him to avoid foods that trigger gout.    Dr. Blanca Alvarado  [facility] [address], [address]  [place] [address]  Phone: [phone]  Fax: [phone]       yes

## 2024-12-03 ENCOUNTER — APPOINTMENT (OUTPATIENT)
Dept: ORTHOPEDIC SURGERY | Age: 64
End: 2024-12-03
Payer: COMMERCIAL

## 2024-12-03 DIAGNOSIS — M65.30 TRIGGER FINGER, UNSPECIFIED FINGER: ICD-10-CM

## 2024-12-03 PROCEDURE — 99214 OFFICE O/P EST MOD 30 MIN: CPT

## 2024-12-19 ENCOUNTER — NON-APPOINTMENT (OUTPATIENT)
Age: 64
End: 2024-12-19

## 2024-12-19 ENCOUNTER — APPOINTMENT (OUTPATIENT)
Dept: INTERNAL MEDICINE | Facility: CLINIC | Age: 64
End: 2024-12-19
Payer: COMMERCIAL

## 2024-12-19 VITALS
RESPIRATION RATE: 16 BRPM | HEIGHT: 70 IN | OXYGEN SATURATION: 93 % | WEIGHT: 197 LBS | DIASTOLIC BLOOD PRESSURE: 85 MMHG | HEART RATE: 80 BPM | TEMPERATURE: 97.2 F | BODY MASS INDEX: 28.2 KG/M2 | SYSTOLIC BLOOD PRESSURE: 157 MMHG

## 2024-12-19 DIAGNOSIS — Z01.818 ENCOUNTER FOR OTHER PREPROCEDURAL EXAMINATION: ICD-10-CM

## 2024-12-19 PROCEDURE — 36415 COLL VENOUS BLD VENIPUNCTURE: CPT

## 2024-12-19 PROCEDURE — 99213 OFFICE O/P EST LOW 20 MIN: CPT

## 2024-12-19 PROCEDURE — 93000 ELECTROCARDIOGRAM COMPLETE: CPT

## 2024-12-20 LAB
ALBUMIN SERPL ELPH-MCNC: 4.7 G/DL
ALP BLD-CCNC: 67 U/L
ALT SERPL-CCNC: 23 U/L
ANION GAP SERPL CALC-SCNC: 13 MMOL/L
AST SERPL-CCNC: 32 U/L
BASOPHILS # BLD AUTO: 0.04 K/UL
BASOPHILS NFR BLD AUTO: 0.7 %
BILIRUB SERPL-MCNC: 0.3 MG/DL
BUN SERPL-MCNC: 31 MG/DL
CALCIUM SERPL-MCNC: 10.2 MG/DL
CHLORIDE SERPL-SCNC: 102 MMOL/L
CO2 SERPL-SCNC: 25 MMOL/L
CREAT SERPL-MCNC: 1.09 MG/DL
EGFR: 77 ML/MIN/1.73M2
EOSINOPHIL # BLD AUTO: 0.13 K/UL
EOSINOPHIL NFR BLD AUTO: 2.2 %
GLUCOSE SERPL-MCNC: 85 MG/DL
HCT VFR BLD CALC: 45 %
HGB BLD-MCNC: 14.5 G/DL
IMM GRANULOCYTES NFR BLD AUTO: 0.2 %
LYMPHOCYTES # BLD AUTO: 1.88 K/UL
LYMPHOCYTES NFR BLD AUTO: 31.1 %
MAN DIFF?: NORMAL
MCHC RBC-ENTMCNC: 31.9 PG
MCHC RBC-ENTMCNC: 32.2 G/DL
MCV RBC AUTO: 98.9 FL
MONOCYTES # BLD AUTO: 0.67 K/UL
MONOCYTES NFR BLD AUTO: 11.1 %
NEUTROPHILS # BLD AUTO: 3.31 K/UL
NEUTROPHILS NFR BLD AUTO: 54.7 %
PLATELET # BLD AUTO: 282 K/UL
POTASSIUM SERPL-SCNC: 4.6 MMOL/L
PROT SERPL-MCNC: 7 G/DL
RBC # BLD: 4.55 M/UL
RBC # FLD: 13.2 %
SODIUM SERPL-SCNC: 139 MMOL/L
WBC # FLD AUTO: 6.04 K/UL

## 2024-12-20 RX ORDER — OXYCODONE AND ACETAMINOPHEN 5; 325 MG/1; MG/1
5-325 TABLET ORAL
Qty: 8 | Refills: 0 | Status: ACTIVE | COMMUNITY
Start: 2024-12-20 | End: 1900-01-01

## 2024-12-20 NOTE — ASU PATIENT PROFILE, ADULT - NS TRANSFER PATIENT BELONGINGS
earbuds, wallet/Cell Phone/PDA (specify)/Electronic Device (specify)/Jewelry/Money (specify)/Clothing

## 2024-12-20 NOTE — ASU PATIENT PROFILE, ADULT - VISION (WITH CORRECTIVE LENSES IF THE PATIENT USUALLY WEARS THEM):
Normal vision: sees adequately in most situations; can see medication labels, newsprint PRN readers/Partially impaired: cannot see medication labels or newsprint, but can see obstacles in path, and the surrounding layout; can count fingers at arm's length

## 2024-12-22 NOTE — ASU DISCHARGE PLAN (ADULT/PEDIATRIC) - FINANCIAL ASSISTANCE
[facility] provides services at a reduced cost to those who are determined to be eligible through [facility]’s financial assistance program. Information regarding [facility]’s financial assistance program can be found by going to [email or web address] or by calling [phone].

## 2024-12-22 NOTE — ASU DISCHARGE PLAN (ADULT/PEDIATRIC) - ASU DC SPECIAL INSTRUCTIONSFT
Do not remove dressing/bandage  Keep dressing clean and dry  Digital range of motion  Hand elevation     Please call the office at [phone] if you have any questions or concerns.

## 2024-12-22 NOTE — ASU DISCHARGE PLAN (ADULT/PEDIATRIC) - NS MD DC FALL RISK RISK
For information on Fall & Injury Prevention, visit: [email or web address] OR  [email or web address] OR  https://www.cdc.gov/steadi/patient.html

## 2024-12-23 ENCOUNTER — TRANSCRIPTION ENCOUNTER (OUTPATIENT)
Age: 64
End: 2024-12-23

## 2024-12-23 ENCOUNTER — APPOINTMENT (OUTPATIENT)
Dept: ORTHOPEDIC SURGERY | Facility: AMBULATORY SURGERY CENTER | Age: 64
End: 2024-12-23

## 2024-12-23 ENCOUNTER — OUTPATIENT (OUTPATIENT)
Dept: OUTPATIENT SERVICES | Facility: HOSPITAL | Age: 64
LOS: 1 days | Discharge: HOME | End: 2024-12-23
Payer: COMMERCIAL

## 2024-12-23 VITALS
WEIGHT: 198.2 LBS | OXYGEN SATURATION: 100 % | TEMPERATURE: 98 F | SYSTOLIC BLOOD PRESSURE: 130 MMHG | HEART RATE: 62 BPM | DIASTOLIC BLOOD PRESSURE: 76 MMHG | HEIGHT: 70 IN | RESPIRATION RATE: 16 BRPM

## 2024-12-23 VITALS
TEMPERATURE: 97 F | HEART RATE: 65 BPM | RESPIRATION RATE: 16 BRPM | DIASTOLIC BLOOD PRESSURE: 78 MMHG | OXYGEN SATURATION: 100 % | SYSTOLIC BLOOD PRESSURE: 149 MMHG

## 2024-12-23 DIAGNOSIS — Z98.890 OTHER SPECIFIED POSTPROCEDURAL STATES: Chronic | ICD-10-CM

## 2024-12-23 DIAGNOSIS — Z96.641 PRESENCE OF RIGHT ARTIFICIAL HIP JOINT: Chronic | ICD-10-CM

## 2024-12-23 PROCEDURE — 26440 RELEASE PALM/FINGER TENDON: CPT | Mod: FA

## 2024-12-23 RX ORDER — 0.9 % SODIUM CHLORIDE 0.9 %
1000 INTRAVENOUS SOLUTION INTRAVENOUS
Refills: 0 | Status: DISCONTINUED | OUTPATIENT
Start: 2024-12-23 | End: 2024-12-23

## 2024-12-23 RX ORDER — FENTANYL 12 UG/H
25 PATCH, EXTENDED RELEASE TRANSDERMAL
Refills: 0 | Status: DISCONTINUED | OUTPATIENT
Start: 2024-12-23 | End: 2024-12-23

## 2024-12-26 ENCOUNTER — OUTPATIENT (OUTPATIENT)
Dept: OUTPATIENT SERVICES | Facility: HOSPITAL | Age: 64
LOS: 1 days | End: 2024-12-26

## 2024-12-26 ENCOUNTER — APPOINTMENT (OUTPATIENT)
Dept: MRI IMAGING | Facility: CLINIC | Age: 64
End: 2024-12-26
Payer: COMMERCIAL

## 2024-12-26 DIAGNOSIS — Z98.890 OTHER SPECIFIED POSTPROCEDURAL STATES: Chronic | ICD-10-CM

## 2024-12-26 DIAGNOSIS — Z96.641 PRESENCE OF RIGHT ARTIFICIAL HIP JOINT: Chronic | ICD-10-CM

## 2024-12-26 PROCEDURE — 73721 MRI JNT OF LWR EXTRE W/O DYE: CPT | Mod: 26,LT

## 2024-12-31 ENCOUNTER — APPOINTMENT (OUTPATIENT)
Dept: ORTHOPEDIC SURGERY | Facility: CLINIC | Age: 64
End: 2024-12-31

## 2025-01-03 ENCOUNTER — APPOINTMENT (OUTPATIENT)
Dept: ORTHOPEDIC SURGERY | Facility: CLINIC | Age: 65
End: 2025-01-03
Payer: COMMERCIAL

## 2025-01-03 ENCOUNTER — NON-APPOINTMENT (OUTPATIENT)
Age: 65
End: 2025-01-03

## 2025-01-03 DIAGNOSIS — M77.01 MEDIAL EPICONDYLITIS, RIGHT ELBOW: ICD-10-CM

## 2025-01-03 DIAGNOSIS — M77.02 MEDIAL EPICONDYLITIS, LEFT ELBOW: ICD-10-CM

## 2025-01-03 PROCEDURE — 99024 POSTOP FOLLOW-UP VISIT: CPT

## 2025-01-29 ENCOUNTER — APPOINTMENT (OUTPATIENT)
Dept: ORTHOPEDIC SURGERY | Facility: CLINIC | Age: 65
End: 2025-01-29
Payer: COMMERCIAL

## 2025-01-29 VITALS
DIASTOLIC BLOOD PRESSURE: 73 MMHG | HEIGHT: 70 IN | SYSTOLIC BLOOD PRESSURE: 126 MMHG | WEIGHT: 197 LBS | OXYGEN SATURATION: 99 % | BODY MASS INDEX: 28.2 KG/M2 | HEART RATE: 86 BPM

## 2025-01-29 DIAGNOSIS — Z47.1 AFTERCARE FOLLOWING JOINT REPLACEMENT SURGERY: ICD-10-CM

## 2025-01-29 DIAGNOSIS — Z96.642 AFTERCARE FOLLOWING JOINT REPLACEMENT SURGERY: ICD-10-CM

## 2025-01-29 PROCEDURE — 99214 OFFICE O/P EST MOD 30 MIN: CPT | Mod: 24

## 2025-02-21 ENCOUNTER — APPOINTMENT (OUTPATIENT)
Dept: DERMATOLOGY | Facility: CLINIC | Age: 65
End: 2025-02-21
Payer: COMMERCIAL

## 2025-02-21 VITALS — BODY MASS INDEX: 27.2 KG/M2 | WEIGHT: 190 LBS | HEIGHT: 70 IN

## 2025-02-21 DIAGNOSIS — D48.5 NEOPLASM OF UNCERTAIN BEHAVIOR OF SKIN: ICD-10-CM

## 2025-02-21 DIAGNOSIS — Z85.820 PERSONAL HISTORY OF MALIGNANT MELANOMA OF SKIN: ICD-10-CM

## 2025-02-21 DIAGNOSIS — Z12.83 ENCOUNTER FOR SCREENING FOR MALIGNANT NEOPLASM OF SKIN: ICD-10-CM

## 2025-02-21 DIAGNOSIS — L81.4 OTHER MELANIN HYPERPIGMENTATION: ICD-10-CM

## 2025-02-21 DIAGNOSIS — D22.9 MELANOCYTIC NEVI, UNSPECIFIED: ICD-10-CM

## 2025-02-21 PROCEDURE — 99213 OFFICE O/P EST LOW 20 MIN: CPT | Mod: 25

## 2025-02-21 PROCEDURE — 11102 TANGNTL BX SKIN SINGLE LES: CPT

## 2025-03-06 NOTE — PATIENT PROFILE ADULT - FALL HARM RISK - HARM RISK INTERVENTIONS
Discharge instructions reviewed with patient and spouse Handouts given & verbalized understanding with no further questions at this time. Dr Hemphill spoke to pt at bedside, reviewed procedure and findings, answered questions. Made aware they are awaiting biopsy results with MD telephone number provided per AVS sheet. VSS on RA, no pain or nausea noted, tolerating po fluids, no complaints noted. Fall precautions reviewed, consents in chart.     Assistance with ambulation/Assistance OOB with selected safe patient handling equipment/Communicate Risk of Fall with Harm to all staff/Discuss with provider need for PT consult/Monitor gait and stability/Provide patient with walking aids - walker, cane, crutches/Reinforce activity limits and safety measures with patient and family/Sit up slowly, dangle for a short time, stand at bedside before walking/Tailored Fall Risk Interventions/Use of alarms - bed, chair and/or voice tab/Visual Cue: Yellow wristband and red socks/Bed in lowest position, wheels locked, appropriate side rails in place/Call bell, personal items and telephone in reach/Instruct patient to call for assistance before getting out of bed or chair/Non-slip footwear when patient is out of bed/[place] to call system/Physically safe environment - no spills, clutter or unnecessary equipment/Purposeful Proactive Rounding/Room/bathroom lighting operational, light cord in reach Assistance with ambulation/Assistance OOB with selected safe patient handling equipment/Communicate Risk of Fall with Harm to all staff/Discuss with provider need for PT consult/Monitor gait and stability/Provide patient with walking aids - walker, cane, crutches/Reinforce activity limits and safety measures with patient and family/Sit up slowly, dangle for a short time, stand at bedside before walking/Tailored Fall Risk Interventions/Use of alarms - bed, chair and/or voice tab/Visual Cue: Yellow wristband and red socks/Bed in lowest position, wheels locked, appropriate side rails in place/Call bell, personal items and telephone in reach/Instruct patient to call for assistance before getting out of bed or chair/Non-slip footwear when patient is out of bed/[place] to call system/Physically safe environment - no spills, clutter or unnecessary equipment/Purposeful Proactive Rounding/Room/bathroom lighting operational, light cord in reach

## 2025-03-21 ENCOUNTER — NON-APPOINTMENT (OUTPATIENT)
Age: 65
End: 2025-03-21

## 2025-04-18 ENCOUNTER — APPOINTMENT (OUTPATIENT)
Dept: ORTHOPEDIC SURGERY | Facility: CLINIC | Age: 65
End: 2025-04-18

## 2025-07-10 ENCOUNTER — APPOINTMENT (OUTPATIENT)
Dept: ORTHOPEDIC SURGERY | Age: 65
End: 2025-07-10
Payer: COMMERCIAL

## 2025-07-10 VITALS — HEIGHT: 70 IN | RESPIRATION RATE: 16 BRPM | WEIGHT: 184 LBS | BODY MASS INDEX: 26.34 KG/M2

## 2025-07-10 PROCEDURE — 99204 OFFICE O/P NEW MOD 45 MIN: CPT

## 2025-07-10 PROCEDURE — 73564 X-RAY EXAM KNEE 4 OR MORE: CPT | Mod: 50

## 2025-07-25 RX ORDER — HYALURONATE SODIUM, STABILIZED 88 MG/4 ML
88 SYRINGE (ML) INTRAARTICULAR
Qty: 2 | Refills: 0 | Status: ACTIVE | COMMUNITY
Start: 2025-07-25 | End: 1900-01-01

## 2025-08-14 ENCOUNTER — APPOINTMENT (OUTPATIENT)
Dept: ORTHOPEDIC SURGERY | Age: 65
End: 2025-08-14
Payer: COMMERCIAL

## 2025-08-14 DIAGNOSIS — M17.12 UNILATERAL PRIMARY OSTEOARTHRITIS, LEFT KNEE: ICD-10-CM

## 2025-08-14 DIAGNOSIS — M17.11 UNILATERAL PRIMARY OSTEOARTHRITIS, RIGHT KNEE: ICD-10-CM

## 2025-08-14 PROCEDURE — 20611 DRAIN/INJ JOINT/BURSA W/US: CPT | Mod: 50

## 2025-08-15 PROBLEM — M17.12 PRIMARY OSTEOARTHRITIS OF LEFT KNEE: Status: ACTIVE | Noted: 2025-07-10

## 2025-08-15 PROBLEM — M17.11 PRIMARY OSTEOARTHRITIS OF RIGHT KNEE: Status: ACTIVE | Noted: 2025-07-10

## 2025-08-25 ENCOUNTER — APPOINTMENT (OUTPATIENT)
Dept: OPHTHALMOLOGY | Facility: CLINIC | Age: 65
End: 2025-08-25

## 2025-08-25 ENCOUNTER — APPOINTMENT (OUTPATIENT)
Dept: DERMATOLOGY | Facility: CLINIC | Age: 65
End: 2025-08-25
Payer: COMMERCIAL

## 2025-08-25 DIAGNOSIS — L81.4 OTHER MELANIN HYPERPIGMENTATION: ICD-10-CM

## 2025-08-25 DIAGNOSIS — Z12.83 ENCOUNTER FOR SCREENING FOR MALIGNANT NEOPLASM OF SKIN: ICD-10-CM

## 2025-08-25 DIAGNOSIS — Z85.820 PERSONAL HISTORY OF MALIGNANT MELANOMA OF SKIN: ICD-10-CM

## 2025-08-25 DIAGNOSIS — R22.9 LOCALIZED SWELLING, MASS AND LUMP, UNSPECIFIED: ICD-10-CM

## 2025-08-25 DIAGNOSIS — D22.9 MELANOCYTIC NEVI, UNSPECIFIED: ICD-10-CM

## 2025-08-25 PROCEDURE — 99213 OFFICE O/P EST LOW 20 MIN: CPT

## 2025-08-30 PROBLEM — R22.9 SUBCUTANEOUS NODULE: Status: ACTIVE | Noted: 2025-08-30

## (undated) DEVICE — ELCTR AQUAMANTYS BIPOLAR SEALER 6.0

## (undated) DEVICE — GLV 8.5 PROTEXIS (WHITE)

## (undated) DEVICE — GLV 8 PROTEXIS (WHITE)

## (undated) DEVICE — SAW BLADE STRYKER SAGITTAL 81.5X12.5X1.19MM

## (undated) DEVICE — GLV 7.5 PROTEXIS ORTHO (CREAM)

## (undated) DEVICE — TOURNIQUET CUFF 24" DUAL PORT SINGLE BLADDER W PLC (BLACK)

## (undated) DEVICE — DRAPE 3/4 SHEET 52X76"

## (undated) DEVICE — DRAPE LIGHT HANDLE COVER (GREEN)

## (undated) DEVICE — SPECIMEN CONTAINER 4OZ

## (undated) DEVICE — DRAPE C ARM 41X74"

## (undated) DEVICE — SUT MONOCRYL 5-0 18" P-3 UNDYED

## (undated) DEVICE — SUT ETHILON 5-0 18" P-3

## (undated) DEVICE — ELCTR STRYKER NEPTUNE SMOKE EVACUATION PENCIL (GREEN)

## (undated) DEVICE — PREP CHLORAPREP HI-LITE ORANGE 26ML

## (undated) DEVICE — SUT STRATAFIX SPIRAL PDO 1 30CM OS-6

## (undated) DEVICE — GLV 7 PROTEXIS ORTHO (CREAM)

## (undated) DEVICE — PACK HAND

## (undated) DEVICE — SUT ETHIBOND 1 30" OS8

## (undated) DEVICE — WOUND IRR IRRISEPT W 0.5 CHG

## (undated) DEVICE — DRAPE TOWEL BLUE 17" X 24"

## (undated) DEVICE — SLV COMPRESSION KNEE MED

## (undated) DEVICE — GLV 7 PROTEXIS (WHITE)

## (undated) DEVICE — BEAVER BLADE MIN-BLADE ROUNDED TIP 1-SIDE SHARP (GREEN)

## (undated) DEVICE — DRAPE SUPINE ESYSUIT

## (undated) DEVICE — WOUND IRR SURGIPHOR

## (undated) DEVICE — DRSG AQUACEL 3.5 X 10"

## (undated) DEVICE — SYS ENDO STRATOS RELEASE 30 DEG 4MM

## (undated) DEVICE — DRSG DERMABOND PRINEO 22CM

## (undated) DEVICE — TOURNIQUET CUFF 18" DUAL PORT SINGLE BLADDER W PLC  (BLACK)

## (undated) DEVICE — DRSG XEROFORM 5 X 9"

## (undated) DEVICE — SUT STRATAFIX SPIRAL PDO 2-0 36CM MH

## (undated) DEVICE — ELCTR BOVIE PENCIL BLADE 10FT

## (undated) DEVICE — SUT VICRYL 0 36" CT-1 UNDYED

## (undated) DEVICE — DRAPE LIGHT HANDLE COVER (BLUE)

## (undated) DEVICE — DRSG COMBINE 5X9"

## (undated) DEVICE — HOOD T7 PLUS PEELAWAY

## (undated) DEVICE — PACK TOTAL HIP (2 PACKS)

## (undated) DEVICE — SUT STRATAFIX SPIRAL MONOCRYL PLUS 3-0 30CM PS-1 UNDYED

## (undated) DEVICE — PACK ORTHO ELBOW HAND

## (undated) DEVICE — SUT STRATAFIX SPIRAL PDO 0 24CM CP-2

## (undated) DEVICE — SUT VICRYL 2-0 27" CT-1 UNDYED

## (undated) DEVICE — HOOD FLYTE STRYKER HELMET SHIELD

## (undated) DEVICE — NDL HYPO SAFE 22G X 1.5" (BLACK)

## (undated) DEVICE — DRSG COBAN 6"

## (undated) DEVICE — MARKING PEN W RULER

## (undated) DEVICE — WARMING BLANKET LOWER ADULT

## (undated) DEVICE — GLV 7.5 PROTEXIS (WHITE)

## (undated) DEVICE — HOOD T5 PEELAWAY

## (undated) DEVICE — WARMING BLANKET UPPER ADULT